# Patient Record
Sex: MALE | Race: ASIAN | NOT HISPANIC OR LATINO | URBAN - METROPOLITAN AREA
[De-identification: names, ages, dates, MRNs, and addresses within clinical notes are randomized per-mention and may not be internally consistent; named-entity substitution may affect disease eponyms.]

---

## 2020-01-01 ENCOUNTER — INPATIENT (INPATIENT)
Facility: HOSPITAL | Age: 85
LOS: 8 days | DRG: 870 | End: 2020-11-21
Attending: INTERNAL MEDICINE | Admitting: INTERNAL MEDICINE
Payer: MEDICARE

## 2020-01-01 VITALS
DIASTOLIC BLOOD PRESSURE: 66 MMHG | HEART RATE: 104 BPM | OXYGEN SATURATION: 87 % | RESPIRATION RATE: 22 BRPM | WEIGHT: 168.65 LBS | HEIGHT: 70 IN | SYSTOLIC BLOOD PRESSURE: 125 MMHG

## 2020-01-01 VITALS — DIASTOLIC BLOOD PRESSURE: 20 MMHG | SYSTOLIC BLOOD PRESSURE: 45 MMHG

## 2020-01-01 DIAGNOSIS — I50.9 HEART FAILURE, UNSPECIFIED: ICD-10-CM

## 2020-01-01 DIAGNOSIS — J18.9 PNEUMONIA, UNSPECIFIED ORGANISM: ICD-10-CM

## 2020-01-01 DIAGNOSIS — R53.81 OTHER MALAISE: ICD-10-CM

## 2020-01-01 DIAGNOSIS — A41.9 SEPSIS, UNSPECIFIED ORGANISM: ICD-10-CM

## 2020-01-01 DIAGNOSIS — E44.0 MODERATE PROTEIN-CALORIE MALNUTRITION: ICD-10-CM

## 2020-01-01 DIAGNOSIS — Z51.5 ENCOUNTER FOR PALLIATIVE CARE: ICD-10-CM

## 2020-01-01 LAB
% ALBUMIN: 44.9 % — SIGNIFICANT CHANGE UP
% ALPHA 1: 8.8 % — SIGNIFICANT CHANGE UP
% ALPHA 2: 11.7 % — SIGNIFICANT CHANGE UP
% BETA: 12.8 % — SIGNIFICANT CHANGE UP
% GAMMA: 21.8 % — SIGNIFICANT CHANGE UP
A1C WITH ESTIMATED AVERAGE GLUCOSE RESULT: 5 % — SIGNIFICANT CHANGE UP (ref 4–5.6)
ABO RH CONFIRMATION: SIGNIFICANT CHANGE UP
ACANTHOCYTES BLD QL SMEAR: SLIGHT — SIGNIFICANT CHANGE UP
ACANTHOCYTES BLD QL SMEAR: SLIGHT — SIGNIFICANT CHANGE UP
ALBUMIN SERPL ELPH-MCNC: 1.5 G/DL — LOW (ref 3.5–5)
ALBUMIN SERPL ELPH-MCNC: 1.6 G/DL — LOW (ref 3.5–5)
ALBUMIN SERPL ELPH-MCNC: 1.7 G/DL — LOW (ref 3.5–5)
ALBUMIN SERPL ELPH-MCNC: 1.7 G/DL — LOW (ref 3.5–5)
ALBUMIN SERPL ELPH-MCNC: 2 G/DL — LOW (ref 3.6–5.5)
ALBUMIN SERPL ELPH-MCNC: 2.4 G/DL — LOW (ref 3.5–5)
ALBUMIN SERPL ELPH-MCNC: 2.5 G/DL — LOW (ref 3.5–5)
ALBUMIN SERPL ELPH-MCNC: 2.5 G/DL — LOW (ref 3.5–5)
ALBUMIN/GLOB SERPL ELPH: 0.8 RATIO — SIGNIFICANT CHANGE UP
ALP SERPL-CCNC: 113 U/L — SIGNIFICANT CHANGE UP (ref 40–120)
ALP SERPL-CCNC: 134 U/L — HIGH (ref 40–120)
ALP SERPL-CCNC: 60 U/L — SIGNIFICANT CHANGE UP (ref 40–120)
ALP SERPL-CCNC: 65 U/L — SIGNIFICANT CHANGE UP (ref 40–120)
ALP SERPL-CCNC: 67 U/L — SIGNIFICANT CHANGE UP (ref 40–120)
ALP SERPL-CCNC: 67 U/L — SIGNIFICANT CHANGE UP (ref 40–120)
ALP SERPL-CCNC: 69 U/L — SIGNIFICANT CHANGE UP (ref 40–120)
ALP SERPL-CCNC: 70 U/L — SIGNIFICANT CHANGE UP (ref 40–120)
ALP SERPL-CCNC: 71 U/L — SIGNIFICANT CHANGE UP (ref 40–120)
ALP SERPL-CCNC: 71 U/L — SIGNIFICANT CHANGE UP (ref 40–120)
ALPHA1 GLOB SERPL ELPH-MCNC: 0.4 G/DL — SIGNIFICANT CHANGE UP (ref 0.1–0.4)
ALPHA2 GLOB SERPL ELPH-MCNC: 0.5 G/DL — SIGNIFICANT CHANGE UP (ref 0.5–1)
ALT FLD-CCNC: 21 U/L DA — SIGNIFICANT CHANGE UP (ref 10–60)
ALT FLD-CCNC: 21 U/L DA — SIGNIFICANT CHANGE UP (ref 10–60)
ALT FLD-CCNC: 23 U/L DA — SIGNIFICANT CHANGE UP (ref 10–60)
ALT FLD-CCNC: 25 U/L DA — SIGNIFICANT CHANGE UP (ref 10–60)
ALT FLD-CCNC: 27 U/L DA — SIGNIFICANT CHANGE UP (ref 10–60)
ALT FLD-CCNC: 39 U/L DA — SIGNIFICANT CHANGE UP (ref 10–60)
ALT FLD-CCNC: 50 U/L DA — SIGNIFICANT CHANGE UP (ref 10–60)
ALT FLD-CCNC: 56 U/L DA — SIGNIFICANT CHANGE UP (ref 10–60)
ALT FLD-CCNC: 70 U/L DA — HIGH (ref 10–60)
ALT FLD-CCNC: 90 U/L DA — HIGH (ref 10–60)
ANA TITR SER: NEGATIVE — SIGNIFICANT CHANGE UP
ANION GAP SERPL CALC-SCNC: 10 MMOL/L — SIGNIFICANT CHANGE UP (ref 5–17)
ANION GAP SERPL CALC-SCNC: 11 MMOL/L — SIGNIFICANT CHANGE UP (ref 5–17)
ANION GAP SERPL CALC-SCNC: 13 MMOL/L — SIGNIFICANT CHANGE UP (ref 5–17)
ANION GAP SERPL CALC-SCNC: 14 MMOL/L — SIGNIFICANT CHANGE UP (ref 5–17)
ANION GAP SERPL CALC-SCNC: 17 MMOL/L — SIGNIFICANT CHANGE UP (ref 5–17)
ANION GAP SERPL CALC-SCNC: 8 MMOL/L — SIGNIFICANT CHANGE UP (ref 5–17)
ANION GAP SERPL CALC-SCNC: 8 MMOL/L — SIGNIFICANT CHANGE UP (ref 5–17)
ANION GAP SERPL CALC-SCNC: 9 MMOL/L — SIGNIFICANT CHANGE UP (ref 5–17)
ANISOCYTOSIS BLD QL: SLIGHT — SIGNIFICANT CHANGE UP
ANISOCYTOSIS BLD QL: SLIGHT — SIGNIFICANT CHANGE UP
APPEARANCE UR: CLEAR — SIGNIFICANT CHANGE UP
APTT BLD: 34.4 SEC — SIGNIFICANT CHANGE UP (ref 27.5–35.5)
APTT BLD: 35.6 SEC — HIGH (ref 27.5–35.5)
APTT BLD: 37.5 SEC — HIGH (ref 27.5–35.5)
APTT BLD: 38.6 SEC — HIGH (ref 27.5–35.5)
APTT BLD: 39.3 SEC — HIGH (ref 27.5–35.5)
APTT BLD: >200 SEC — CRITICAL HIGH (ref 27.5–35.5)
APTT BLD: >200 SEC — CRITICAL HIGH (ref 27.5–35.5)
AST SERPL-CCNC: 174 U/L — HIGH (ref 10–40)
AST SERPL-CCNC: 31 U/L — SIGNIFICANT CHANGE UP (ref 10–40)
AST SERPL-CCNC: 32 U/L — SIGNIFICANT CHANGE UP (ref 10–40)
AST SERPL-CCNC: 35 U/L — SIGNIFICANT CHANGE UP (ref 10–40)
AST SERPL-CCNC: 39 U/L — SIGNIFICANT CHANGE UP (ref 10–40)
AST SERPL-CCNC: 51 U/L — HIGH (ref 10–40)
AST SERPL-CCNC: 56 U/L — HIGH (ref 10–40)
AST SERPL-CCNC: 61 U/L — HIGH (ref 10–40)
AST SERPL-CCNC: 74 U/L — HIGH (ref 10–40)
AST SERPL-CCNC: 96 U/L — HIGH (ref 10–40)
AUTO DIFF PNL BLD: ABNORMAL
B-GLOBULIN SERPL ELPH-MCNC: 0.6 G/DL — SIGNIFICANT CHANGE UP (ref 0.5–1)
BACTERIA # UR AUTO: ABNORMAL /HPF
BASE EXCESS BLDA CALC-SCNC: -1.5 MMOL/L — SIGNIFICANT CHANGE UP (ref -2–2)
BASE EXCESS BLDA CALC-SCNC: -12.9 MMOL/L — LOW (ref -2–2)
BASE EXCESS BLDA CALC-SCNC: -2.1 MMOL/L — LOW (ref -2–2)
BASE EXCESS BLDA CALC-SCNC: -4.2 MMOL/L — LOW (ref -2–2)
BASE EXCESS BLDA CALC-SCNC: -5 MMOL/L — LOW (ref -2–2)
BASE EXCESS BLDA CALC-SCNC: -5.9 MMOL/L — LOW (ref -2–2)
BASE EXCESS BLDA CALC-SCNC: -6 MMOL/L — LOW (ref -2–2)
BASE EXCESS BLDA CALC-SCNC: -6.4 MMOL/L — LOW (ref -2–2)
BASE EXCESS BLDA CALC-SCNC: -9.7 MMOL/L — LOW (ref -2–2)
BASE EXCESS BLDA CALC-SCNC: 0.1 MMOL/L — SIGNIFICANT CHANGE UP (ref -2–2)
BASE EXCESS BLDV CALC-SCNC: -12.6 MMOL/L — LOW (ref -2–2)
BASE EXCESS BLDV CALC-SCNC: -5.7 MMOL/L — LOW (ref -2–2)
BASOPHILS # BLD AUTO: 0 K/UL — SIGNIFICANT CHANGE UP (ref 0–0.2)
BASOPHILS # BLD AUTO: 0.01 K/UL — SIGNIFICANT CHANGE UP (ref 0–0.2)
BASOPHILS # BLD AUTO: 0.01 K/UL — SIGNIFICANT CHANGE UP (ref 0–0.2)
BASOPHILS # BLD AUTO: 0.02 K/UL — SIGNIFICANT CHANGE UP (ref 0–0.2)
BASOPHILS # BLD AUTO: 0.03 K/UL — SIGNIFICANT CHANGE UP (ref 0–0.2)
BASOPHILS # BLD AUTO: 0.04 K/UL — SIGNIFICANT CHANGE UP (ref 0–0.2)
BASOPHILS # BLD AUTO: SIGNIFICANT CHANGE UP K/UL (ref 0–0.2)
BASOPHILS NFR BLD AUTO: 0 % — SIGNIFICANT CHANGE UP (ref 0–2)
BASOPHILS NFR BLD AUTO: 0.1 % — SIGNIFICANT CHANGE UP (ref 0–2)
BASOPHILS NFR BLD AUTO: 0.2 % — SIGNIFICANT CHANGE UP (ref 0–2)
BASOPHILS NFR BLD AUTO: 0.2 % — SIGNIFICANT CHANGE UP (ref 0–2)
BASOPHILS NFR BLD AUTO: 0.3 % — SIGNIFICANT CHANGE UP (ref 0–2)
BASOPHILS NFR BLD AUTO: SIGNIFICANT CHANGE UP % (ref 0–2)
BILIRUB DIRECT SERPL-MCNC: 0.4 MG/DL — HIGH (ref 0–0.2)
BILIRUB DIRECT SERPL-MCNC: 0.4 MG/DL — HIGH (ref 0–0.2)
BILIRUB INDIRECT FLD-MCNC: 0.5 MG/DL — SIGNIFICANT CHANGE UP (ref 0.2–1)
BILIRUB SERPL-MCNC: 0.4 MG/DL — SIGNIFICANT CHANGE UP (ref 0.2–1.2)
BILIRUB SERPL-MCNC: 0.5 MG/DL — SIGNIFICANT CHANGE UP (ref 0.2–1.2)
BILIRUB SERPL-MCNC: 0.6 MG/DL — SIGNIFICANT CHANGE UP (ref 0.2–1.2)
BILIRUB SERPL-MCNC: 0.6 MG/DL — SIGNIFICANT CHANGE UP (ref 0.2–1.2)
BILIRUB SERPL-MCNC: 0.8 MG/DL — SIGNIFICANT CHANGE UP (ref 0.2–1.2)
BILIRUB SERPL-MCNC: 0.8 MG/DL — SIGNIFICANT CHANGE UP (ref 0.2–1.2)
BILIRUB SERPL-MCNC: 0.9 MG/DL — SIGNIFICANT CHANGE UP (ref 0.2–1.2)
BILIRUB UR-MCNC: NEGATIVE — SIGNIFICANT CHANGE UP
BLD GP AB SCN SERPL QL: SIGNIFICANT CHANGE UP
BLD GP AB SCN SERPL QL: SIGNIFICANT CHANGE UP
BLISTER CELLS BLD QL SMEAR: PRESENT — SIGNIFICANT CHANGE UP
BLOOD GAS COMMENTS ARTERIAL: SIGNIFICANT CHANGE UP
BLOOD GAS COMMENTS, VENOUS: SIGNIFICANT CHANGE UP
BLOOD GAS COMMENTS, VENOUS: SIGNIFICANT CHANGE UP
BUN SERPL-MCNC: 23 MG/DL — HIGH (ref 7–18)
BUN SERPL-MCNC: 26 MG/DL — HIGH (ref 7–18)
BUN SERPL-MCNC: 27 MG/DL — HIGH (ref 7–18)
BUN SERPL-MCNC: 28 MG/DL — HIGH (ref 7–18)
BUN SERPL-MCNC: 30 MG/DL — HIGH (ref 7–18)
BUN SERPL-MCNC: 33 MG/DL — HIGH (ref 7–18)
BUN SERPL-MCNC: 37 MG/DL — HIGH (ref 7–18)
BUN SERPL-MCNC: 38 MG/DL — HIGH (ref 7–18)
BUN SERPL-MCNC: 50 MG/DL — HIGH (ref 7–18)
BUN SERPL-MCNC: 51 MG/DL — HIGH (ref 7–18)
BUN SERPL-MCNC: 51 MG/DL — HIGH (ref 7–18)
BUN SERPL-MCNC: 55 MG/DL — HIGH (ref 7–18)
C-ANCA SER-ACNC: NEGATIVE — SIGNIFICANT CHANGE UP
C3 SERPL-MCNC: 52 MG/DL — LOW (ref 81–157)
C4 SERPL-MCNC: 20 MG/DL — SIGNIFICANT CHANGE UP (ref 13–39)
CALCIUM SERPL-MCNC: 6.7 MG/DL — LOW (ref 8.4–10.5)
CALCIUM SERPL-MCNC: 6.7 MG/DL — LOW (ref 8.4–10.5)
CALCIUM SERPL-MCNC: 6.8 MG/DL — LOW (ref 8.4–10.5)
CALCIUM SERPL-MCNC: 6.9 MG/DL — LOW (ref 8.4–10.5)
CALCIUM SERPL-MCNC: 7.1 MG/DL — LOW (ref 8.4–10.5)
CALCIUM SERPL-MCNC: 7.2 MG/DL — LOW (ref 8.4–10.5)
CALCIUM SERPL-MCNC: 7.3 MG/DL — LOW (ref 8.4–10.5)
CALCIUM SERPL-MCNC: 7.4 MG/DL — LOW (ref 8.4–10.5)
CALCIUM SERPL-MCNC: 7.6 MG/DL — LOW (ref 8.4–10.5)
CALCIUM SERPL-MCNC: 8.1 MG/DL — LOW (ref 8.4–10.5)
CALCIUM SERPL-MCNC: 8.5 MG/DL — SIGNIFICANT CHANGE UP (ref 8.4–10.5)
CALCIUM SERPL-MCNC: 8.7 MG/DL — SIGNIFICANT CHANGE UP (ref 8.4–10.5)
CHLORIDE SERPL-SCNC: 100 MMOL/L — SIGNIFICANT CHANGE UP (ref 96–108)
CHLORIDE SERPL-SCNC: 101 MMOL/L — SIGNIFICANT CHANGE UP (ref 96–108)
CHLORIDE SERPL-SCNC: 101 MMOL/L — SIGNIFICANT CHANGE UP (ref 96–108)
CHLORIDE SERPL-SCNC: 102 MMOL/L — SIGNIFICANT CHANGE UP (ref 96–108)
CHLORIDE SERPL-SCNC: 104 MMOL/L — SIGNIFICANT CHANGE UP (ref 96–108)
CHLORIDE SERPL-SCNC: 105 MMOL/L — SIGNIFICANT CHANGE UP (ref 96–108)
CHLORIDE SERPL-SCNC: 98 MMOL/L — SIGNIFICANT CHANGE UP (ref 96–108)
CHLORIDE SERPL-SCNC: 99 MMOL/L — SIGNIFICANT CHANGE UP (ref 96–108)
CHOLEST SERPL-MCNC: 172 MG/DL — SIGNIFICANT CHANGE UP
CO2 SERPL-SCNC: 13 MMOL/L — LOW (ref 22–31)
CO2 SERPL-SCNC: 16 MMOL/L — LOW (ref 22–31)
CO2 SERPL-SCNC: 16 MMOL/L — LOW (ref 22–31)
CO2 SERPL-SCNC: 21 MMOL/L — LOW (ref 22–31)
CO2 SERPL-SCNC: 22 MMOL/L — SIGNIFICANT CHANGE UP (ref 22–31)
CO2 SERPL-SCNC: 23 MMOL/L — SIGNIFICANT CHANGE UP (ref 22–31)
CO2 SERPL-SCNC: 24 MMOL/L — SIGNIFICANT CHANGE UP (ref 22–31)
CO2 SERPL-SCNC: 25 MMOL/L — SIGNIFICANT CHANGE UP (ref 22–31)
CO2 SERPL-SCNC: 25 MMOL/L — SIGNIFICANT CHANGE UP (ref 22–31)
CO2 SERPL-SCNC: 26 MMOL/L — SIGNIFICANT CHANGE UP (ref 22–31)
COLOR SPEC: YELLOW — SIGNIFICANT CHANGE UP
CREAT ?TM UR-MCNC: 88 MG/DL — SIGNIFICANT CHANGE UP
CREAT SERPL-MCNC: 2.77 MG/DL — HIGH (ref 0.5–1.3)
CREAT SERPL-MCNC: 2.97 MG/DL — HIGH (ref 0.5–1.3)
CREAT SERPL-MCNC: 3.03 MG/DL — HIGH (ref 0.5–1.3)
CREAT SERPL-MCNC: 3.27 MG/DL — HIGH (ref 0.5–1.3)
CREAT SERPL-MCNC: 3.51 MG/DL — HIGH (ref 0.5–1.3)
CREAT SERPL-MCNC: 3.56 MG/DL — HIGH (ref 0.5–1.3)
CREAT SERPL-MCNC: 3.61 MG/DL — HIGH (ref 0.5–1.3)
CREAT SERPL-MCNC: 4.04 MG/DL — HIGH (ref 0.5–1.3)
CREAT SERPL-MCNC: 4.19 MG/DL — HIGH (ref 0.5–1.3)
CREAT SERPL-MCNC: 4.19 MG/DL — HIGH (ref 0.5–1.3)
CREAT SERPL-MCNC: 4.21 MG/DL — HIGH (ref 0.5–1.3)
CREAT SERPL-MCNC: 4.24 MG/DL — HIGH (ref 0.5–1.3)
CREAT SERPL-MCNC: 5.37 MG/DL — HIGH (ref 0.5–1.3)
CREAT SERPL-MCNC: 5.6 MG/DL — HIGH (ref 0.5–1.3)
CREAT SERPL-MCNC: 5.96 MG/DL — HIGH (ref 0.5–1.3)
CRP SERPL-MCNC: 14.81 MG/DL — HIGH (ref 0–0.4)
CULTURE RESULTS: NO GROWTH — SIGNIFICANT CHANGE UP
CULTURE RESULTS: SIGNIFICANT CHANGE UP
D DIMER BLD IA.RAPID-MCNC: 941 NG/ML DDU — HIGH
DIFF PNL FLD: ABNORMAL
EOSINOPHIL # BLD AUTO: 0 K/UL — SIGNIFICANT CHANGE UP (ref 0–0.5)
EOSINOPHIL # BLD AUTO: 0.01 K/UL — SIGNIFICANT CHANGE UP (ref 0–0.5)
EOSINOPHIL # BLD AUTO: 0.05 K/UL — SIGNIFICANT CHANGE UP (ref 0–0.5)
EOSINOPHIL # BLD AUTO: 0.09 K/UL — SIGNIFICANT CHANGE UP (ref 0–0.5)
EOSINOPHIL # BLD AUTO: 0.18 K/UL — SIGNIFICANT CHANGE UP (ref 0–0.5)
EOSINOPHIL # BLD AUTO: 0.2 K/UL — SIGNIFICANT CHANGE UP (ref 0–0.5)
EOSINOPHIL # BLD AUTO: 0.22 K/UL — SIGNIFICANT CHANGE UP (ref 0–0.5)
EOSINOPHIL # BLD AUTO: 0.34 K/UL — SIGNIFICANT CHANGE UP (ref 0–0.5)
EOSINOPHIL # BLD AUTO: SIGNIFICANT CHANGE UP K/UL (ref 0–0.5)
EOSINOPHIL NFR BLD AUTO: 0 % — SIGNIFICANT CHANGE UP (ref 0–6)
EOSINOPHIL NFR BLD AUTO: 0.1 % — SIGNIFICANT CHANGE UP (ref 0–6)
EOSINOPHIL NFR BLD AUTO: 0.3 % — SIGNIFICANT CHANGE UP (ref 0–6)
EOSINOPHIL NFR BLD AUTO: 0.7 % — SIGNIFICANT CHANGE UP (ref 0–6)
EOSINOPHIL NFR BLD AUTO: 1.3 % — SIGNIFICANT CHANGE UP (ref 0–6)
EOSINOPHIL NFR BLD AUTO: 1.4 % — SIGNIFICANT CHANGE UP (ref 0–6)
EOSINOPHIL NFR BLD AUTO: 1.5 % — SIGNIFICANT CHANGE UP (ref 0–6)
EOSINOPHIL NFR BLD AUTO: 2.4 % — SIGNIFICANT CHANGE UP (ref 0–6)
EOSINOPHIL NFR BLD AUTO: SIGNIFICANT CHANGE UP % (ref 0–6)
EPI CELLS # UR: SIGNIFICANT CHANGE UP /HPF
ERYTHROCYTE [SEDIMENTATION RATE] IN BLOOD: 84 MM/HR — HIGH (ref 0–20)
ESTIMATED AVERAGE GLUCOSE: 97 MG/DL — SIGNIFICANT CHANGE UP (ref 68–114)
FIBRINOGEN PPP-MCNC: 568 MG/DL — HIGH (ref 290–520)
FOLATE SERPL-MCNC: 18.4 NG/ML — SIGNIFICANT CHANGE UP
GAMMA GLOBULIN: 1 G/DL — SIGNIFICANT CHANGE UP (ref 0.6–1.6)
GBM IGG SER-ACNC: <1 AI — SIGNIFICANT CHANGE UP
GLUCOSE BLDC GLUCOMTR-MCNC: 106 MG/DL — HIGH (ref 70–99)
GLUCOSE BLDC GLUCOMTR-MCNC: 110 MG/DL — HIGH (ref 70–99)
GLUCOSE BLDC GLUCOMTR-MCNC: 121 MG/DL — HIGH (ref 70–99)
GLUCOSE BLDC GLUCOMTR-MCNC: 130 MG/DL — HIGH (ref 70–99)
GLUCOSE BLDC GLUCOMTR-MCNC: 131 MG/DL — HIGH (ref 70–99)
GLUCOSE BLDC GLUCOMTR-MCNC: 134 MG/DL — HIGH (ref 70–99)
GLUCOSE BLDC GLUCOMTR-MCNC: 136 MG/DL — HIGH (ref 70–99)
GLUCOSE BLDC GLUCOMTR-MCNC: 142 MG/DL — HIGH (ref 70–99)
GLUCOSE BLDC GLUCOMTR-MCNC: 151 MG/DL — HIGH (ref 70–99)
GLUCOSE BLDC GLUCOMTR-MCNC: 168 MG/DL — HIGH (ref 70–99)
GLUCOSE BLDC GLUCOMTR-MCNC: 200 MG/DL — HIGH (ref 70–99)
GLUCOSE BLDC GLUCOMTR-MCNC: 218 MG/DL — HIGH (ref 70–99)
GLUCOSE BLDC GLUCOMTR-MCNC: 222 MG/DL — HIGH (ref 70–99)
GLUCOSE BLDC GLUCOMTR-MCNC: 28 MG/DL — CRITICAL LOW (ref 70–99)
GLUCOSE BLDC GLUCOMTR-MCNC: 99 MG/DL — SIGNIFICANT CHANGE UP (ref 70–99)
GLUCOSE SERPL-MCNC: 104 MG/DL — HIGH (ref 70–99)
GLUCOSE SERPL-MCNC: 111 MG/DL — HIGH (ref 70–99)
GLUCOSE SERPL-MCNC: 113 MG/DL — HIGH (ref 70–99)
GLUCOSE SERPL-MCNC: 116 MG/DL — HIGH (ref 70–99)
GLUCOSE SERPL-MCNC: 125 MG/DL — HIGH (ref 70–99)
GLUCOSE SERPL-MCNC: 135 MG/DL — HIGH (ref 70–99)
GLUCOSE SERPL-MCNC: 160 MG/DL — HIGH (ref 70–99)
GLUCOSE SERPL-MCNC: 208 MG/DL — HIGH (ref 70–99)
GLUCOSE SERPL-MCNC: 79 MG/DL — SIGNIFICANT CHANGE UP (ref 70–99)
GLUCOSE SERPL-MCNC: 80 MG/DL — SIGNIFICANT CHANGE UP (ref 70–99)
GLUCOSE SERPL-MCNC: 81 MG/DL — SIGNIFICANT CHANGE UP (ref 70–99)
GLUCOSE SERPL-MCNC: 82 MG/DL — SIGNIFICANT CHANGE UP (ref 70–99)
GLUCOSE SERPL-MCNC: 94 MG/DL — SIGNIFICANT CHANGE UP (ref 70–99)
GLUCOSE SERPL-MCNC: 97 MG/DL — SIGNIFICANT CHANGE UP (ref 70–99)
GLUCOSE UR QL: NEGATIVE — SIGNIFICANT CHANGE UP
GRAM STN FLD: SIGNIFICANT CHANGE UP
GRAN CASTS # UR COMP ASSIST: ABNORMAL /LPF
HAPTOGLOB SERPL-MCNC: 106 MG/DL — SIGNIFICANT CHANGE UP (ref 34–200)
HAV IGM SER-ACNC: SIGNIFICANT CHANGE UP
HBV CORE IGM SER-ACNC: SIGNIFICANT CHANGE UP
HBV SURFACE AG SER-ACNC: SIGNIFICANT CHANGE UP
HCO3 BLDA-SCNC: 15 MMOL/L — LOW (ref 23–27)
HCO3 BLDA-SCNC: 16 MMOL/L — LOW (ref 23–27)
HCO3 BLDA-SCNC: 20 MMOL/L — LOW (ref 23–27)
HCO3 BLDA-SCNC: 20 MMOL/L — LOW (ref 23–27)
HCO3 BLDA-SCNC: 21 MMOL/L — LOW (ref 23–27)
HCO3 BLDA-SCNC: 22 MMOL/L — LOW (ref 23–27)
HCO3 BLDA-SCNC: 22 MMOL/L — LOW (ref 23–27)
HCO3 BLDA-SCNC: 23 MMOL/L — SIGNIFICANT CHANGE UP (ref 23–27)
HCO3 BLDA-SCNC: 23 MMOL/L — SIGNIFICANT CHANGE UP (ref 23–27)
HCO3 BLDA-SCNC: 24 MMOL/L — SIGNIFICANT CHANGE UP (ref 23–27)
HCO3 BLDV-SCNC: 16 MMOL/L — LOW (ref 21–29)
HCO3 BLDV-SCNC: 22 MMOL/L — SIGNIFICANT CHANGE UP (ref 21–29)
HCT VFR BLD CALC: 20.3 % — CRITICAL LOW (ref 39–50)
HCT VFR BLD CALC: 20.3 % — CRITICAL LOW (ref 39–50)
HCT VFR BLD CALC: 21.7 % — LOW (ref 39–50)
HCT VFR BLD CALC: 22 % — LOW (ref 39–50)
HCT VFR BLD CALC: 22.3 % — LOW (ref 39–50)
HCT VFR BLD CALC: 22.4 % — LOW (ref 39–50)
HCT VFR BLD CALC: 23.3 % — LOW (ref 39–50)
HCT VFR BLD CALC: 23.6 % — LOW (ref 39–50)
HCT VFR BLD CALC: 24 % — LOW (ref 39–50)
HCT VFR BLD CALC: 24.3 % — LOW (ref 39–50)
HCT VFR BLD CALC: 24.3 % — LOW (ref 39–50)
HCT VFR BLD CALC: 24.6 % — LOW (ref 39–50)
HCT VFR BLD CALC: 25 % — LOW (ref 39–50)
HCT VFR BLD CALC: 25.7 % — LOW (ref 39–50)
HCT VFR BLD CALC: 26.9 % — LOW (ref 39–50)
HCT VFR BLD CALC: 27.5 % — LOW (ref 39–50)
HCT VFR BLD CALC: 28.8 % — LOW (ref 39–50)
HCV AB S/CO SERPL IA: 0.15 S/CO — SIGNIFICANT CHANGE UP (ref 0–0.99)
HCV AB SERPL-IMP: SIGNIFICANT CHANGE UP
HDLC SERPL-MCNC: 46 MG/DL — SIGNIFICANT CHANGE UP
HEPARIN-PF4 AB RESULT: 0.6 U/ML — SIGNIFICANT CHANGE UP (ref 0–0.9)
HGB BLD-MCNC: 6.3 G/DL — CRITICAL LOW (ref 13–17)
HGB BLD-MCNC: 6.4 G/DL — CRITICAL LOW (ref 13–17)
HGB BLD-MCNC: 7.1 G/DL — LOW (ref 13–17)
HGB BLD-MCNC: 7.1 G/DL — LOW (ref 13–17)
HGB BLD-MCNC: 7.2 G/DL — LOW (ref 13–17)
HGB BLD-MCNC: 7.4 G/DL — LOW (ref 13–17)
HGB BLD-MCNC: 7.6 G/DL — LOW (ref 13–17)
HGB BLD-MCNC: 7.8 G/DL — LOW (ref 13–17)
HGB BLD-MCNC: 7.8 G/DL — LOW (ref 13–17)
HGB BLD-MCNC: 8.1 G/DL — LOW (ref 13–17)
HGB BLD-MCNC: 8.4 G/DL — LOW (ref 13–17)
HGB BLD-MCNC: 8.5 G/DL — LOW (ref 13–17)
HGB BLD-MCNC: 9 G/DL — LOW (ref 13–17)
HGB BLD-MCNC: 9.2 G/DL — LOW (ref 13–17)
HGB BLD-MCNC: 9.7 G/DL — LOW (ref 13–17)
HOROWITZ INDEX BLDA+IHG-RTO: 100 — SIGNIFICANT CHANGE UP
HOROWITZ INDEX BLDA+IHG-RTO: 60 — SIGNIFICANT CHANGE UP
HOROWITZ INDEX BLDA+IHG-RTO: 60 — SIGNIFICANT CHANGE UP
HOROWITZ INDEX BLDA+IHG-RTO: 70 — SIGNIFICANT CHANGE UP
HOROWITZ INDEX BLDA+IHG-RTO: 80 — SIGNIFICANT CHANGE UP
HOROWITZ INDEX BLDA+IHG-RTO: 90 — SIGNIFICANT CHANGE UP
HOROWITZ INDEX BLDV+IHG-RTO: 100 — SIGNIFICANT CHANGE UP
HOROWITZ INDEX BLDV+IHG-RTO: 100 — SIGNIFICANT CHANGE UP
HYALINE CASTS # UR AUTO: ABNORMAL /LPF
HYPOCHROMIA BLD QL: SLIGHT — SIGNIFICANT CHANGE UP
HYPOSEGMENTATION: PRESENT — SIGNIFICANT CHANGE UP
IMM GRANULOCYTES NFR BLD AUTO: 0.5 % — SIGNIFICANT CHANGE UP (ref 0–1.5)
IMM GRANULOCYTES NFR BLD AUTO: 0.6 % — SIGNIFICANT CHANGE UP (ref 0–1.5)
IMM GRANULOCYTES NFR BLD AUTO: 0.8 % — SIGNIFICANT CHANGE UP (ref 0–1.5)
IMM GRANULOCYTES NFR BLD AUTO: 0.8 % — SIGNIFICANT CHANGE UP (ref 0–1.5)
IMM GRANULOCYTES NFR BLD AUTO: 1.2 % — SIGNIFICANT CHANGE UP (ref 0–1.5)
IMM GRANULOCYTES NFR BLD AUTO: SIGNIFICANT CHANGE UP % (ref 0–1.5)
INR BLD: 1.29 RATIO — HIGH (ref 0.88–1.16)
INR BLD: 1.4 RATIO — HIGH (ref 0.88–1.16)
INR BLD: 1.56 RATIO — HIGH (ref 0.88–1.16)
INR BLD: 1.75 RATIO — HIGH (ref 0.88–1.16)
INR BLD: 1.83 RATIO — HIGH (ref 0.88–1.16)
INR BLD: 1.94 RATIO — HIGH (ref 0.88–1.16)
INTERPRETATION SERPL IFE-IMP: SIGNIFICANT CHANGE UP
IRON SATN MFR SERPL: 17 % — LOW (ref 20–55)
IRON SATN MFR SERPL: 24 UG/DL — LOW (ref 65–170)
KAPPA LC SER QL IFE: 14.62 MG/DL — HIGH (ref 0.33–1.94)
KAPPA/LAMBDA FREE LIGHT CHAIN RATIO, SERUM: 1.33 RATIO — SIGNIFICANT CHANGE UP (ref 0.26–1.65)
KETONES UR-MCNC: NEGATIVE — SIGNIFICANT CHANGE UP
LACTATE SERPL-SCNC: 0.8 MMOL/L — SIGNIFICANT CHANGE UP (ref 0.7–2)
LACTATE SERPL-SCNC: 2 MMOL/L — SIGNIFICANT CHANGE UP (ref 0.7–2)
LAMBDA LC SER QL IFE: 10.97 MG/DL — HIGH (ref 0.57–2.63)
LDH SERPL L TO P-CCNC: 271 U/L — HIGH (ref 120–225)
LEUKOCYTE ESTERASE UR-ACNC: ABNORMAL
LG PLATELETS BLD QL AUTO: SLIGHT — SIGNIFICANT CHANGE UP
LIPID PNL WITH DIRECT LDL SERPL: 109 MG/DL — HIGH
LYMPHOCYTES # BLD AUTO: 0.38 K/UL — LOW (ref 1–3.3)
LYMPHOCYTES # BLD AUTO: 0.43 K/UL — LOW (ref 1–3.3)
LYMPHOCYTES # BLD AUTO: 0.49 K/UL — LOW (ref 1–3.3)
LYMPHOCYTES # BLD AUTO: 0.5 K/UL — LOW (ref 1–3.3)
LYMPHOCYTES # BLD AUTO: 0.51 K/UL — LOW (ref 1–3.3)
LYMPHOCYTES # BLD AUTO: 0.56 K/UL — LOW (ref 1–3.3)
LYMPHOCYTES # BLD AUTO: 0.64 K/UL — LOW (ref 1–3.3)
LYMPHOCYTES # BLD AUTO: 0.82 K/UL — LOW (ref 1–3.3)
LYMPHOCYTES # BLD AUTO: 3 % — LOW (ref 13–44)
LYMPHOCYTES # BLD AUTO: 3.2 % — LOW (ref 13–44)
LYMPHOCYTES # BLD AUTO: 3.3 % — LOW (ref 13–44)
LYMPHOCYTES # BLD AUTO: 3.7 % — LOW (ref 13–44)
LYMPHOCYTES # BLD AUTO: 4.1 % — LOW (ref 13–44)
LYMPHOCYTES # BLD AUTO: 4.5 % — LOW (ref 13–44)
LYMPHOCYTES # BLD AUTO: 5 % — LOW (ref 13–44)
LYMPHOCYTES # BLD AUTO: 5.7 % — LOW (ref 13–44)
LYMPHOCYTES # BLD AUTO: SIGNIFICANT CHANGE UP % (ref 13–44)
LYMPHOCYTES # BLD AUTO: SIGNIFICANT CHANGE UP K/UL (ref 1–3.3)
MACROCYTES BLD QL: SLIGHT — SIGNIFICANT CHANGE UP
MAGNESIUM SERPL-MCNC: 2 MG/DL — SIGNIFICANT CHANGE UP (ref 1.6–2.6)
MAGNESIUM SERPL-MCNC: 2 MG/DL — SIGNIFICANT CHANGE UP (ref 1.6–2.6)
MAGNESIUM SERPL-MCNC: 2.1 MG/DL — SIGNIFICANT CHANGE UP (ref 1.6–2.6)
MAGNESIUM SERPL-MCNC: 2.2 MG/DL — SIGNIFICANT CHANGE UP (ref 1.6–2.6)
MAGNESIUM SERPL-MCNC: 2.2 MG/DL — SIGNIFICANT CHANGE UP (ref 1.6–2.6)
MAGNESIUM SERPL-MCNC: 2.5 MG/DL — SIGNIFICANT CHANGE UP (ref 1.6–2.6)
MANUAL SMEAR VERIFICATION: SIGNIFICANT CHANGE UP
MANUAL SMEAR VERIFICATION: SIGNIFICANT CHANGE UP
MCHC RBC-ENTMCNC: 31 GM/DL — LOW (ref 32–36)
MCHC RBC-ENTMCNC: 31.5 GM/DL — LOW (ref 32–36)
MCHC RBC-ENTMCNC: 31.8 GM/DL — LOW (ref 32–36)
MCHC RBC-ENTMCNC: 32.1 PG — SIGNIFICANT CHANGE UP (ref 27–34)
MCHC RBC-ENTMCNC: 32.2 PG — SIGNIFICANT CHANGE UP (ref 27–34)
MCHC RBC-ENTMCNC: 32.4 PG — SIGNIFICANT CHANGE UP (ref 27–34)
MCHC RBC-ENTMCNC: 32.4 PG — SIGNIFICANT CHANGE UP (ref 27–34)
MCHC RBC-ENTMCNC: 32.5 GM/DL — SIGNIFICANT CHANGE UP (ref 32–36)
MCHC RBC-ENTMCNC: 32.5 PG — SIGNIFICANT CHANGE UP (ref 27–34)
MCHC RBC-ENTMCNC: 32.6 GM/DL — SIGNIFICANT CHANGE UP (ref 32–36)
MCHC RBC-ENTMCNC: 32.6 PG — SIGNIFICANT CHANGE UP (ref 27–34)
MCHC RBC-ENTMCNC: 32.7 GM/DL — SIGNIFICANT CHANGE UP (ref 32–36)
MCHC RBC-ENTMCNC: 32.7 GM/DL — SIGNIFICANT CHANGE UP (ref 32–36)
MCHC RBC-ENTMCNC: 32.8 PG — SIGNIFICANT CHANGE UP (ref 27–34)
MCHC RBC-ENTMCNC: 32.8 PG — SIGNIFICANT CHANGE UP (ref 27–34)
MCHC RBC-ENTMCNC: 32.9 GM/DL — SIGNIFICANT CHANGE UP (ref 32–36)
MCHC RBC-ENTMCNC: 33 GM/DL — SIGNIFICANT CHANGE UP (ref 32–36)
MCHC RBC-ENTMCNC: 33.1 GM/DL — SIGNIFICANT CHANGE UP (ref 32–36)
MCHC RBC-ENTMCNC: 33.1 GM/DL — SIGNIFICANT CHANGE UP (ref 32–36)
MCHC RBC-ENTMCNC: 33.2 PG — SIGNIFICANT CHANGE UP (ref 27–34)
MCHC RBC-ENTMCNC: 33.2 PG — SIGNIFICANT CHANGE UP (ref 27–34)
MCHC RBC-ENTMCNC: 33.3 GM/DL — SIGNIFICANT CHANGE UP (ref 32–36)
MCHC RBC-ENTMCNC: 33.3 GM/DL — SIGNIFICANT CHANGE UP (ref 32–36)
MCHC RBC-ENTMCNC: 33.5 GM/DL — SIGNIFICANT CHANGE UP (ref 32–36)
MCHC RBC-ENTMCNC: 33.5 GM/DL — SIGNIFICANT CHANGE UP (ref 32–36)
MCHC RBC-ENTMCNC: 33.6 GM/DL — SIGNIFICANT CHANGE UP (ref 32–36)
MCHC RBC-ENTMCNC: 33.6 PG — SIGNIFICANT CHANGE UP (ref 27–34)
MCHC RBC-ENTMCNC: 33.7 GM/DL — SIGNIFICANT CHANGE UP (ref 32–36)
MCV RBC AUTO: 100.4 FL — HIGH (ref 80–100)
MCV RBC AUTO: 101.8 FL — HIGH (ref 80–100)
MCV RBC AUTO: 102.8 FL — HIGH (ref 80–100)
MCV RBC AUTO: 104.1 FL — HIGH (ref 80–100)
MCV RBC AUTO: 104.6 FL — HIGH (ref 80–100)
MCV RBC AUTO: 96.6 FL — SIGNIFICANT CHANGE UP (ref 80–100)
MCV RBC AUTO: 97.2 FL — SIGNIFICANT CHANGE UP (ref 80–100)
MCV RBC AUTO: 97.5 FL — SIGNIFICANT CHANGE UP (ref 80–100)
MCV RBC AUTO: 97.5 FL — SIGNIFICANT CHANGE UP (ref 80–100)
MCV RBC AUTO: 97.6 FL — SIGNIFICANT CHANGE UP (ref 80–100)
MCV RBC AUTO: 98.1 FL — SIGNIFICANT CHANGE UP (ref 80–100)
MCV RBC AUTO: 98.8 FL — SIGNIFICANT CHANGE UP (ref 80–100)
MCV RBC AUTO: 99.5 FL — SIGNIFICANT CHANGE UP (ref 80–100)
MONOCYTES # BLD AUTO: 0.1 K/UL — SIGNIFICANT CHANGE UP (ref 0–0.9)
MONOCYTES # BLD AUTO: 0.4 K/UL — SIGNIFICANT CHANGE UP (ref 0–0.9)
MONOCYTES # BLD AUTO: 0.48 K/UL — SIGNIFICANT CHANGE UP (ref 0–0.9)
MONOCYTES # BLD AUTO: 0.48 K/UL — SIGNIFICANT CHANGE UP (ref 0–0.9)
MONOCYTES # BLD AUTO: 0.52 K/UL — SIGNIFICANT CHANGE UP (ref 0–0.9)
MONOCYTES # BLD AUTO: 0.57 K/UL — SIGNIFICANT CHANGE UP (ref 0–0.9)
MONOCYTES # BLD AUTO: 0.76 K/UL — SIGNIFICANT CHANGE UP (ref 0–0.9)
MONOCYTES # BLD AUTO: 1.22 K/UL — HIGH (ref 0–0.9)
MONOCYTES # BLD AUTO: SIGNIFICANT CHANGE UP K/UL (ref 0–0.9)
MONOCYTES NFR BLD AUTO: 1 % — LOW (ref 2–14)
MONOCYTES NFR BLD AUTO: 2.9 % — SIGNIFICANT CHANGE UP (ref 2–14)
MONOCYTES NFR BLD AUTO: 3.4 % — SIGNIFICANT CHANGE UP (ref 2–14)
MONOCYTES NFR BLD AUTO: 3.7 % — SIGNIFICANT CHANGE UP (ref 2–14)
MONOCYTES NFR BLD AUTO: 3.8 % — SIGNIFICANT CHANGE UP (ref 2–14)
MONOCYTES NFR BLD AUTO: 4.6 % — SIGNIFICANT CHANGE UP (ref 2–14)
MONOCYTES NFR BLD AUTO: 4.8 % — SIGNIFICANT CHANGE UP (ref 2–14)
MONOCYTES NFR BLD AUTO: 8.4 % — SIGNIFICANT CHANGE UP (ref 2–14)
MONOCYTES NFR BLD AUTO: SIGNIFICANT CHANGE UP % (ref 2–14)
MRSA PCR RESULT.: SIGNIFICANT CHANGE UP
MRSA PCR RESULT.: SIGNIFICANT CHANGE UP
NEUTROPHILS # BLD AUTO: 11.4 K/UL — HIGH (ref 1.8–7.4)
NEUTROPHILS # BLD AUTO: 11.92 K/UL — HIGH (ref 1.8–7.4)
NEUTROPHILS # BLD AUTO: 12.07 K/UL — HIGH (ref 1.8–7.4)
NEUTROPHILS # BLD AUTO: 12.34 K/UL — HIGH (ref 1.8–7.4)
NEUTROPHILS # BLD AUTO: 12.59 K/UL — HIGH (ref 1.8–7.4)
NEUTROPHILS # BLD AUTO: 12.59 K/UL — HIGH (ref 1.8–7.4)
NEUTROPHILS # BLD AUTO: 14.33 K/UL — HIGH (ref 1.8–7.4)
NEUTROPHILS # BLD AUTO: 9.13 K/UL — HIGH (ref 1.8–7.4)
NEUTROPHILS # BLD AUTO: SIGNIFICANT CHANGE UP K/UL (ref 1.8–7.4)
NEUTROPHILS NFR BLD AUTO: 83.6 % — HIGH (ref 43–77)
NEUTROPHILS NFR BLD AUTO: 84 % — HIGH (ref 43–77)
NEUTROPHILS NFR BLD AUTO: 89 % — HIGH (ref 43–77)
NEUTROPHILS NFR BLD AUTO: 90.1 % — HIGH (ref 43–77)
NEUTROPHILS NFR BLD AUTO: 90.7 % — HIGH (ref 43–77)
NEUTROPHILS NFR BLD AUTO: 90.9 % — HIGH (ref 43–77)
NEUTROPHILS NFR BLD AUTO: 91.3 % — HIGH (ref 43–77)
NEUTROPHILS NFR BLD AUTO: 91.4 % — HIGH (ref 43–77)
NEUTROPHILS NFR BLD AUTO: SIGNIFICANT CHANGE UP % (ref 43–77)
NEUTS BAND # BLD: 10 % — HIGH (ref 0–8)
NITRITE UR-MCNC: NEGATIVE — SIGNIFICANT CHANGE UP
NON HDL CHOLESTEROL: 126 MG/DL — SIGNIFICANT CHANGE UP
NRBC # BLD: 0 /100 WBCS — SIGNIFICANT CHANGE UP (ref 0–0)
NRBC # BLD: 0 /100 — SIGNIFICANT CHANGE UP (ref 0–0)
NRBC # BLD: SIGNIFICANT CHANGE UP /100 WBCS (ref 0–0)
NT-PROBNP SERPL-SCNC: 8678 PG/ML — HIGH (ref 0–450)
OSMOLALITY UR: 284 MOS/KG — SIGNIFICANT CHANGE UP (ref 50–1200)
OVALOCYTES BLD QL SMEAR: SLIGHT — SIGNIFICANT CHANGE UP
P-ANCA SER-ACNC: NEGATIVE — SIGNIFICANT CHANGE UP
PCO2 BLDA: 34 MMHG — SIGNIFICANT CHANGE UP (ref 32–46)
PCO2 BLDA: 38 MMHG — SIGNIFICANT CHANGE UP (ref 32–46)
PCO2 BLDA: 40 MMHG — SIGNIFICANT CHANGE UP (ref 32–46)
PCO2 BLDA: 41 MMHG — SIGNIFICANT CHANGE UP (ref 32–46)
PCO2 BLDA: 42 MMHG — SIGNIFICANT CHANGE UP (ref 32–46)
PCO2 BLDA: 43 MMHG — SIGNIFICANT CHANGE UP (ref 32–46)
PCO2 BLDA: 48 MMHG — HIGH (ref 32–46)
PCO2 BLDA: 50 MMHG — HIGH (ref 32–46)
PCO2 BLDA: 54 MMHG — HIGH (ref 32–46)
PCO2 BLDA: 66 MMHG — HIGH (ref 32–46)
PCO2 BLDV: 51 MMHG — HIGH (ref 35–50)
PCO2 BLDV: 66 MMHG — HIGH (ref 35–50)
PF4 HEPARIN CMPLX AB SER-ACNC: NEGATIVE — SIGNIFICANT CHANGE UP
PH BLDA: 7.11 — CRITICAL LOW (ref 7.35–7.45)
PH BLDA: 7.14 — CRITICAL LOW (ref 7.35–7.45)
PH BLDA: 7.24 — LOW (ref 7.35–7.45)
PH BLDA: 7.24 — LOW (ref 7.35–7.45)
PH BLDA: 7.29 — LOW (ref 7.35–7.45)
PH BLDA: 7.3 — LOW (ref 7.35–7.45)
PH BLDA: 7.31 — LOW (ref 7.35–7.45)
PH BLDA: 7.31 — LOW (ref 7.35–7.45)
PH BLDA: 7.39 — SIGNIFICANT CHANGE UP (ref 7.35–7.45)
PH BLDA: 7.45 — SIGNIFICANT CHANGE UP (ref 7.35–7.45)
PH BLDV: 7.11 — CRITICAL LOW (ref 7.35–7.45)
PH BLDV: 7.15 — CRITICAL LOW (ref 7.35–7.45)
PH UR: 5 — SIGNIFICANT CHANGE UP (ref 5–8)
PHOSPHATE 24H UR-MCNC: SIGNIFICANT CHANGE UP
PHOSPHATE CL ?TM UR+SERPL-VRATE: SIGNIFICANT CHANGE UP % (ref 78–97)
PHOSPHATE SERPL-MCNC: 1 MG/DL — CRITICAL LOW (ref 2.5–4.5)
PHOSPHATE SERPL-MCNC: 1.4 MG/DL — LOW (ref 2.5–4.5)
PHOSPHATE SERPL-MCNC: 2.4 MG/DL — LOW (ref 2.5–4.5)
PHOSPHATE SERPL-MCNC: 3 MG/DL — SIGNIFICANT CHANGE UP (ref 2.5–4.5)
PHOSPHATE SERPL-MCNC: 3.2 MG/DL — SIGNIFICANT CHANGE UP (ref 2.5–4.5)
PHOSPHATE SERPL-MCNC: 3.4 MG/DL — SIGNIFICANT CHANGE UP (ref 2.5–4.5)
PHOSPHATE SERPL-MCNC: 4.8 MG/DL — HIGH (ref 2.5–4.5)
PHOSPHATE SERPL-MCNC: 5.9 MG/DL — HIGH (ref 2.5–4.5)
PHOSPHATE SERPL-MCNC: 6.1 MG/DL — HIGH (ref 2.5–4.5)
PLAT MORPH BLD: NORMAL — SIGNIFICANT CHANGE UP
PLAT MORPH BLD: NORMAL — SIGNIFICANT CHANGE UP
PLATELET # BLD AUTO: 102 K/UL — LOW (ref 150–400)
PLATELET # BLD AUTO: 112 K/UL — LOW (ref 150–400)
PLATELET # BLD AUTO: 125 K/UL — LOW (ref 150–400)
PLATELET # BLD AUTO: 142 K/UL — LOW (ref 150–400)
PLATELET # BLD AUTO: 158 K/UL — SIGNIFICANT CHANGE UP (ref 150–400)
PLATELET # BLD AUTO: 161 K/UL — SIGNIFICANT CHANGE UP (ref 150–400)
PLATELET # BLD AUTO: 206 K/UL — SIGNIFICANT CHANGE UP (ref 150–400)
PLATELET # BLD AUTO: 239 K/UL — SIGNIFICANT CHANGE UP (ref 150–400)
PLATELET # BLD AUTO: 255 K/UL — SIGNIFICANT CHANGE UP (ref 150–400)
PLATELET # BLD AUTO: 261 K/UL — SIGNIFICANT CHANGE UP (ref 150–400)
PLATELET # BLD AUTO: 263 K/UL — SIGNIFICANT CHANGE UP (ref 150–400)
PLATELET # BLD AUTO: 290 K/UL — SIGNIFICANT CHANGE UP (ref 150–400)
PLATELET # BLD AUTO: 64 K/UL — LOW (ref 150–400)
PLATELET # BLD AUTO: 75 K/UL — LOW (ref 150–400)
PLATELET # BLD AUTO: 79 K/UL — LOW (ref 150–400)
PLATELET # BLD AUTO: 80 K/UL — LOW (ref 150–400)
PLATELET # BLD AUTO: 90 K/UL — LOW (ref 150–400)
PLATELET COUNT - ESTIMATE: ABNORMAL
PLATELET COUNT - ESTIMATE: NORMAL — SIGNIFICANT CHANGE UP
PO2 BLDA: 132 MMHG — HIGH (ref 74–108)
PO2 BLDA: 49 MMHG — CRITICAL LOW (ref 74–108)
PO2 BLDA: 57 MMHG — LOW (ref 74–108)
PO2 BLDA: 59 MMHG — LOW (ref 74–108)
PO2 BLDA: 60 MMHG — LOW (ref 74–108)
PO2 BLDA: 60 MMHG — LOW (ref 74–108)
PO2 BLDA: 61 MMHG — LOW (ref 74–108)
PO2 BLDA: 62 MMHG — LOW (ref 74–108)
PO2 BLDA: 68 MMHG — LOW (ref 74–108)
PO2 BLDA: 84 MMHG — SIGNIFICANT CHANGE UP (ref 74–108)
PO2 BLDV: 33 MMHG — SIGNIFICANT CHANGE UP (ref 25–45)
PO2 BLDV: 84 MMHG — HIGH (ref 25–45)
POIKILOCYTOSIS BLD QL AUTO: SLIGHT — SIGNIFICANT CHANGE UP
POIKILOCYTOSIS BLD QL AUTO: SLIGHT — SIGNIFICANT CHANGE UP
POTASSIUM SERPL-MCNC: 3.1 MMOL/L — LOW (ref 3.5–5.3)
POTASSIUM SERPL-MCNC: 3.3 MMOL/L — LOW (ref 3.5–5.3)
POTASSIUM SERPL-MCNC: 3.5 MMOL/L — SIGNIFICANT CHANGE UP (ref 3.5–5.3)
POTASSIUM SERPL-MCNC: 3.6 MMOL/L — SIGNIFICANT CHANGE UP (ref 3.5–5.3)
POTASSIUM SERPL-MCNC: 3.7 MMOL/L — SIGNIFICANT CHANGE UP (ref 3.5–5.3)
POTASSIUM SERPL-MCNC: 3.7 MMOL/L — SIGNIFICANT CHANGE UP (ref 3.5–5.3)
POTASSIUM SERPL-MCNC: 3.9 MMOL/L — SIGNIFICANT CHANGE UP (ref 3.5–5.3)
POTASSIUM SERPL-MCNC: 4 MMOL/L — SIGNIFICANT CHANGE UP (ref 3.5–5.3)
POTASSIUM SERPL-MCNC: 4.1 MMOL/L — SIGNIFICANT CHANGE UP (ref 3.5–5.3)
POTASSIUM SERPL-MCNC: 4.2 MMOL/L — SIGNIFICANT CHANGE UP (ref 3.5–5.3)
POTASSIUM SERPL-MCNC: 4.6 MMOL/L — SIGNIFICANT CHANGE UP (ref 3.5–5.3)
POTASSIUM SERPL-MCNC: 4.8 MMOL/L — SIGNIFICANT CHANGE UP (ref 3.5–5.3)
POTASSIUM SERPL-SCNC: 3.1 MMOL/L — LOW (ref 3.5–5.3)
POTASSIUM SERPL-SCNC: 3.3 MMOL/L — LOW (ref 3.5–5.3)
POTASSIUM SERPL-SCNC: 3.5 MMOL/L — SIGNIFICANT CHANGE UP (ref 3.5–5.3)
POTASSIUM SERPL-SCNC: 3.6 MMOL/L — SIGNIFICANT CHANGE UP (ref 3.5–5.3)
POTASSIUM SERPL-SCNC: 3.7 MMOL/L — SIGNIFICANT CHANGE UP (ref 3.5–5.3)
POTASSIUM SERPL-SCNC: 3.7 MMOL/L — SIGNIFICANT CHANGE UP (ref 3.5–5.3)
POTASSIUM SERPL-SCNC: 3.9 MMOL/L — SIGNIFICANT CHANGE UP (ref 3.5–5.3)
POTASSIUM SERPL-SCNC: 4 MMOL/L — SIGNIFICANT CHANGE UP (ref 3.5–5.3)
POTASSIUM SERPL-SCNC: 4.1 MMOL/L — SIGNIFICANT CHANGE UP (ref 3.5–5.3)
POTASSIUM SERPL-SCNC: 4.2 MMOL/L — SIGNIFICANT CHANGE UP (ref 3.5–5.3)
POTASSIUM SERPL-SCNC: 4.6 MMOL/L — SIGNIFICANT CHANGE UP (ref 3.5–5.3)
POTASSIUM SERPL-SCNC: 4.8 MMOL/L — SIGNIFICANT CHANGE UP (ref 3.5–5.3)
PROCALCITONIN SERPL-MCNC: 1.3 NG/ML — HIGH (ref 0.02–0.1)
PROCALCITONIN SERPL-MCNC: 7.55 NG/ML — HIGH (ref 0.02–0.1)
PROT ?TM UR-MCNC: 218 MG/DL — HIGH (ref 0–12)
PROT PATTERN SERPL ELPH-IMP: SIGNIFICANT CHANGE UP
PROT SERPL-MCNC: 4.5 G/DL — LOW (ref 6–8.3)
PROT SERPL-MCNC: 4.5 G/DL — LOW (ref 6–8.3)
PROT SERPL-MCNC: 4.8 G/DL — LOW (ref 6–8.3)
PROT SERPL-MCNC: 4.9 G/DL — LOW (ref 6–8.3)
PROT SERPL-MCNC: 5.2 G/DL — LOW (ref 6–8.3)
PROT SERPL-MCNC: 5.2 G/DL — LOW (ref 6–8.3)
PROT SERPL-MCNC: 5.3 G/DL — LOW (ref 6–8.3)
PROT SERPL-MCNC: 5.4 G/DL — LOW (ref 6–8.3)
PROT SERPL-MCNC: 5.5 G/DL — LOW (ref 6–8.3)
PROT SERPL-MCNC: 5.7 G/DL — LOW (ref 6–8.3)
PROT SERPL-MCNC: 5.7 G/DL — LOW (ref 6–8.3)
PROT SERPL-MCNC: 6.1 G/DL — SIGNIFICANT CHANGE UP (ref 6–8.3)
PROT UR-MCNC: 100
PROTHROM AB SERPL-ACNC: 15.2 SEC — HIGH (ref 10.6–13.6)
PROTHROM AB SERPL-ACNC: 16.4 SEC — HIGH (ref 10.6–13.6)
PROTHROM AB SERPL-ACNC: 18.2 SEC — HIGH (ref 10.6–13.6)
PROTHROM AB SERPL-ACNC: 20.3 SEC — HIGH (ref 10.6–13.6)
PROTHROM AB SERPL-ACNC: 21.2 SEC — HIGH (ref 10.6–13.6)
PROTHROM AB SERPL-ACNC: 22.4 SEC — HIGH (ref 10.6–13.6)
RBC # BLD: 1.94 M/UL — LOW (ref 4.2–5.8)
RBC # BLD: 1.95 M/UL — LOW (ref 4.2–5.8)
RBC # BLD: 2.14 M/UL — LOW (ref 4.2–5.8)
RBC # BLD: 2.18 M/UL — LOW (ref 4.2–5.8)
RBC # BLD: 2.19 M/UL — LOW (ref 4.2–5.8)
RBC # BLD: 2.23 M/UL — LOW (ref 4.2–5.8)
RBC # BLD: 2.32 M/UL — LOW (ref 4.2–5.8)
RBC # BLD: 2.39 M/UL — LOW (ref 4.2–5.8)
RBC # BLD: 2.42 M/UL — LOW (ref 4.2–5.8)
RBC # BLD: 2.49 M/UL — LOW (ref 4.2–5.8)
RBC # BLD: 2.49 M/UL — LOW (ref 4.2–5.8)
RBC # BLD: 2.52 M/UL — LOW (ref 4.2–5.8)
RBC # BLD: 2.53 M/UL — LOW (ref 4.2–5.8)
RBC # BLD: 2.62 M/UL — LOW (ref 4.2–5.8)
RBC # BLD: 2.76 M/UL — LOW (ref 4.2–5.8)
RBC # BLD: 2.83 M/UL — LOW (ref 4.2–5.8)
RBC # BLD: 2.98 M/UL — LOW (ref 4.2–5.8)
RBC # FLD: 15.9 % — HIGH (ref 10.3–14.5)
RBC # FLD: 16.2 % — HIGH (ref 10.3–14.5)
RBC # FLD: 16.4 % — HIGH (ref 10.3–14.5)
RBC # FLD: 16.5 % — HIGH (ref 10.3–14.5)
RBC # FLD: 16.6 % — HIGH (ref 10.3–14.5)
RBC # FLD: 16.7 % — HIGH (ref 10.3–14.5)
RBC # FLD: 16.8 % — HIGH (ref 10.3–14.5)
RBC # FLD: 16.9 % — HIGH (ref 10.3–14.5)
RBC # FLD: 16.9 % — HIGH (ref 10.3–14.5)
RBC # FLD: 17 % — HIGH (ref 10.3–14.5)
RBC # FLD: 17.1 % — HIGH (ref 10.3–14.5)
RBC # FLD: 17.1 % — HIGH (ref 10.3–14.5)
RBC # FLD: 17.7 % — HIGH (ref 10.3–14.5)
RBC BLD AUTO: ABNORMAL
RBC BLD AUTO: ABNORMAL
RBC CASTS # UR COMP ASSIST: ABNORMAL /HPF (ref 0–2)
S AUREUS DNA NOSE QL NAA+PROBE: SIGNIFICANT CHANGE UP
S AUREUS DNA NOSE QL NAA+PROBE: SIGNIFICANT CHANGE UP
SAO2 % BLDA: 77 % — LOW (ref 92–96)
SAO2 % BLDA: 83 % — LOW (ref 92–96)
SAO2 % BLDA: 86 % — LOW (ref 92–96)
SAO2 % BLDA: 86 % — LOW (ref 92–96)
SAO2 % BLDA: 90 % — LOW (ref 92–96)
SAO2 % BLDA: 91 % — LOW (ref 92–96)
SAO2 % BLDA: 91 % — LOW (ref 92–96)
SAO2 % BLDA: 95 % — SIGNIFICANT CHANGE UP (ref 92–96)
SAO2 % BLDA: 95 % — SIGNIFICANT CHANGE UP (ref 92–96)
SAO2 % BLDA: 99 % — HIGH (ref 92–96)
SAO2 % BLDV: 41 % — LOW (ref 67–88)
SAO2 % BLDV: 93 % — HIGH (ref 67–88)
SARS-COV-2 IGG SERPL QL IA: NEGATIVE — SIGNIFICANT CHANGE UP
SARS-COV-2 IGM SERPL IA-ACNC: 0.14 INDEX — SIGNIFICANT CHANGE UP
SARS-COV-2 RNA SPEC QL NAA+PROBE: SIGNIFICANT CHANGE UP
SARS-COV-2 RNA SPEC QL NAA+PROBE: SIGNIFICANT CHANGE UP
SCHISTOCYTES BLD QL AUTO: SLIGHT — SIGNIFICANT CHANGE UP
SCHISTOCYTES BLD QL AUTO: SLIGHT — SIGNIFICANT CHANGE UP
SODIUM SERPL-SCNC: 128 MMOL/L — LOW (ref 135–145)
SODIUM SERPL-SCNC: 128 MMOL/L — LOW (ref 135–145)
SODIUM SERPL-SCNC: 129 MMOL/L — LOW (ref 135–145)
SODIUM SERPL-SCNC: 134 MMOL/L — LOW (ref 135–145)
SODIUM SERPL-SCNC: 135 MMOL/L — SIGNIFICANT CHANGE UP (ref 135–145)
SODIUM SERPL-SCNC: 135 MMOL/L — SIGNIFICANT CHANGE UP (ref 135–145)
SODIUM SERPL-SCNC: 136 MMOL/L — SIGNIFICANT CHANGE UP (ref 135–145)
SODIUM SERPL-SCNC: 137 MMOL/L — SIGNIFICANT CHANGE UP (ref 135–145)
SODIUM SERPL-SCNC: 138 MMOL/L — SIGNIFICANT CHANGE UP (ref 135–145)
SODIUM UR-SCNC: 41 MMOL/L — SIGNIFICANT CHANGE UP
SP GR SPEC: 1.02 — SIGNIFICANT CHANGE UP (ref 1.01–1.02)
SPECIMEN SOURCE: SIGNIFICANT CHANGE UP
SRA INTERP SER-IMP: SIGNIFICANT CHANGE UP
T PALLIDUM AB TITR SER: NEGATIVE — SIGNIFICANT CHANGE UP
TIBC SERPL-MCNC: 142 UG/DL — LOW (ref 250–450)
TRIGL SERPL-MCNC: 83 MG/DL — SIGNIFICANT CHANGE UP
TROPONIN I SERPL-MCNC: 0.02 NG/ML — SIGNIFICANT CHANGE UP (ref 0–0.04)
TROPONIN I SERPL-MCNC: 0.02 NG/ML — SIGNIFICANT CHANGE UP (ref 0–0.04)
TROPONIN I SERPL-MCNC: 0.53 NG/ML — HIGH (ref 0–0.04)
TROPONIN I SERPL-MCNC: 0.81 NG/ML — HIGH (ref 0–0.04)
TROPONIN I SERPL-MCNC: <0.015 NG/ML — SIGNIFICANT CHANGE UP (ref 0–0.04)
TSH SERPL-MCNC: 2.01 UU/ML — SIGNIFICANT CHANGE UP (ref 0.34–4.82)
UIBC SERPL-MCNC: 118 UG/DL — SIGNIFICANT CHANGE UP (ref 110–370)
UROBILINOGEN FLD QL: NEGATIVE — SIGNIFICANT CHANGE UP
VIT B12 SERPL-MCNC: 1122 PG/ML — SIGNIFICANT CHANGE UP (ref 232–1245)
WBC # BLD: 10.15 K/UL — SIGNIFICANT CHANGE UP (ref 3.8–10.5)
WBC # BLD: 10.71 K/UL — HIGH (ref 3.8–10.5)
WBC # BLD: 11.25 K/UL — HIGH (ref 3.8–10.5)
WBC # BLD: 11.85 K/UL — HIGH (ref 3.8–10.5)
WBC # BLD: 11.92 K/UL — HIGH (ref 3.8–10.5)
WBC # BLD: 12.47 K/UL — HIGH (ref 3.8–10.5)
WBC # BLD: 13.04 K/UL — HIGH (ref 3.8–10.5)
WBC # BLD: 13.69 K/UL — HIGH (ref 3.8–10.5)
WBC # BLD: 13.88 K/UL — HIGH (ref 3.8–10.5)
WBC # BLD: 14.16 K/UL — HIGH (ref 3.8–10.5)
WBC # BLD: 14.27 K/UL — HIGH (ref 3.8–10.5)
WBC # BLD: 14.44 K/UL — HIGH (ref 3.8–10.5)
WBC # BLD: 14.45 K/UL — HIGH (ref 3.8–10.5)
WBC # BLD: 14.99 K/UL — HIGH (ref 3.8–10.5)
WBC # BLD: 15.77 K/UL — HIGH (ref 3.8–10.5)
WBC # BLD: 16.1 K/UL — HIGH (ref 3.8–10.5)
WBC # BLD: 9.71 K/UL — SIGNIFICANT CHANGE UP (ref 3.8–10.5)
WBC # FLD AUTO: 10.15 K/UL — SIGNIFICANT CHANGE UP (ref 3.8–10.5)
WBC # FLD AUTO: 10.71 K/UL — HIGH (ref 3.8–10.5)
WBC # FLD AUTO: 11.25 K/UL — HIGH (ref 3.8–10.5)
WBC # FLD AUTO: 11.85 K/UL — HIGH (ref 3.8–10.5)
WBC # FLD AUTO: 11.92 K/UL — HIGH (ref 3.8–10.5)
WBC # FLD AUTO: 12.47 K/UL — HIGH (ref 3.8–10.5)
WBC # FLD AUTO: 13.04 K/UL — HIGH (ref 3.8–10.5)
WBC # FLD AUTO: 13.69 K/UL — HIGH (ref 3.8–10.5)
WBC # FLD AUTO: 13.88 K/UL — HIGH (ref 3.8–10.5)
WBC # FLD AUTO: 14.16 K/UL — HIGH (ref 3.8–10.5)
WBC # FLD AUTO: 14.27 K/UL — HIGH (ref 3.8–10.5)
WBC # FLD AUTO: 14.44 K/UL — HIGH (ref 3.8–10.5)
WBC # FLD AUTO: 14.45 K/UL — HIGH (ref 3.8–10.5)
WBC # FLD AUTO: 14.99 K/UL — HIGH (ref 3.8–10.5)
WBC # FLD AUTO: 15.77 K/UL — HIGH (ref 3.8–10.5)
WBC # FLD AUTO: 16.1 K/UL — HIGH (ref 3.8–10.5)
WBC # FLD AUTO: 9.71 K/UL — SIGNIFICANT CHANGE UP (ref 3.8–10.5)
WBC UR QL: SIGNIFICANT CHANGE UP /HPF (ref 0–5)

## 2020-01-01 PROCEDURE — 83540 ASSAY OF IRON: CPT

## 2020-01-01 PROCEDURE — 86901 BLOOD TYPING SEROLOGIC RH(D): CPT

## 2020-01-01 PROCEDURE — 71045 X-RAY EXAM CHEST 1 VIEW: CPT | Mod: 26

## 2020-01-01 PROCEDURE — 70450 CT HEAD/BRAIN W/O DYE: CPT | Mod: 26

## 2020-01-01 PROCEDURE — 80053 COMPREHEN METABOLIC PANEL: CPT

## 2020-01-01 PROCEDURE — 84443 ASSAY THYROID STIM HORMONE: CPT

## 2020-01-01 PROCEDURE — 83880 ASSAY OF NATRIURETIC PEPTIDE: CPT

## 2020-01-01 PROCEDURE — 85610 PROTHROMBIN TIME: CPT

## 2020-01-01 PROCEDURE — 84156 ASSAY OF PROTEIN URINE: CPT

## 2020-01-01 PROCEDURE — 86900 BLOOD TYPING SEROLOGIC ABO: CPT

## 2020-01-01 PROCEDURE — 85652 RBC SED RATE AUTOMATED: CPT

## 2020-01-01 PROCEDURE — 83605 ASSAY OF LACTIC ACID: CPT

## 2020-01-01 PROCEDURE — P9011: CPT

## 2020-01-01 PROCEDURE — 86769 SARS-COV-2 COVID-19 ANTIBODY: CPT

## 2020-01-01 PROCEDURE — 71045 X-RAY EXAM CHEST 1 VIEW: CPT | Mod: 26,76

## 2020-01-01 PROCEDURE — 94640 AIRWAY INHALATION TREATMENT: CPT

## 2020-01-01 PROCEDURE — 84105 ASSAY OF URINE PHOSPHORUS: CPT

## 2020-01-01 PROCEDURE — 84155 ASSAY OF PROTEIN SERUM: CPT

## 2020-01-01 PROCEDURE — 83615 LACTATE (LD) (LDH) ENZYME: CPT

## 2020-01-01 PROCEDURE — 85730 THROMBOPLASTIN TIME PARTIAL: CPT

## 2020-01-01 PROCEDURE — 83521 IG LIGHT CHAINS FREE EACH: CPT

## 2020-01-01 PROCEDURE — 87040 BLOOD CULTURE FOR BACTERIA: CPT

## 2020-01-01 PROCEDURE — 71045 X-RAY EXAM CHEST 1 VIEW: CPT

## 2020-01-01 PROCEDURE — 87086 URINE CULTURE/COLONY COUNT: CPT

## 2020-01-01 PROCEDURE — 86850 RBC ANTIBODY SCREEN: CPT

## 2020-01-01 PROCEDURE — 86038 ANTINUCLEAR ANTIBODIES: CPT

## 2020-01-01 PROCEDURE — 82803 BLOOD GASES ANY COMBINATION: CPT

## 2020-01-01 PROCEDURE — 82247 BILIRUBIN TOTAL: CPT

## 2020-01-01 PROCEDURE — 71250 CT THORAX DX C-: CPT | Mod: 26

## 2020-01-01 PROCEDURE — 96374 THER/PROPH/DIAG INJ IV PUSH: CPT

## 2020-01-01 PROCEDURE — 86160 COMPLEMENT ANTIGEN: CPT

## 2020-01-01 PROCEDURE — 81001 URINALYSIS AUTO W/SCOPE: CPT

## 2020-01-01 PROCEDURE — 80048 BASIC METABOLIC PNL TOTAL CA: CPT

## 2020-01-01 PROCEDURE — 93005 ELECTROCARDIOGRAM TRACING: CPT

## 2020-01-01 PROCEDURE — P9040: CPT

## 2020-01-01 PROCEDURE — 99291 CRITICAL CARE FIRST HOUR: CPT

## 2020-01-01 PROCEDURE — 84300 ASSAY OF URINE SODIUM: CPT

## 2020-01-01 PROCEDURE — 83010 ASSAY OF HAPTOGLOBIN QUANT: CPT

## 2020-01-01 PROCEDURE — P9047: CPT

## 2020-01-01 PROCEDURE — 84100 ASSAY OF PHOSPHORUS: CPT

## 2020-01-01 PROCEDURE — 71250 CT THORAX DX C-: CPT

## 2020-01-01 PROCEDURE — 36430 TRANSFUSION BLD/BLD COMPNT: CPT

## 2020-01-01 PROCEDURE — 84145 PROCALCITONIN (PCT): CPT

## 2020-01-01 PROCEDURE — 82570 ASSAY OF URINE CREATININE: CPT

## 2020-01-01 PROCEDURE — 80061 LIPID PANEL: CPT

## 2020-01-01 PROCEDURE — 97110 THERAPEUTIC EXERCISES: CPT

## 2020-01-01 PROCEDURE — 70450 CT HEAD/BRAIN W/O DYE: CPT

## 2020-01-01 PROCEDURE — 83935 ASSAY OF URINE OSMOLALITY: CPT

## 2020-01-01 PROCEDURE — 84165 PROTEIN E-PHORESIS SERUM: CPT

## 2020-01-01 PROCEDURE — 85027 COMPLETE CBC AUTOMATED: CPT

## 2020-01-01 PROCEDURE — 94760 N-INVAS EAR/PLS OXIMETRY 1: CPT

## 2020-01-01 PROCEDURE — 87070 CULTURE OTHR SPECIMN AEROBIC: CPT

## 2020-01-01 PROCEDURE — 71045 X-RAY EXAM CHEST 1 VIEW: CPT | Mod: 26,77

## 2020-01-01 PROCEDURE — 82607 VITAMIN B-12: CPT

## 2020-01-01 PROCEDURE — 85025 COMPLETE CBC W/AUTO DIFF WBC: CPT

## 2020-01-01 PROCEDURE — 87641 MR-STAPH DNA AMP PROBE: CPT

## 2020-01-01 PROCEDURE — 85379 FIBRIN DEGRADATION QUANT: CPT

## 2020-01-01 PROCEDURE — 97530 THERAPEUTIC ACTIVITIES: CPT

## 2020-01-01 PROCEDURE — 83735 ASSAY OF MAGNESIUM: CPT

## 2020-01-01 PROCEDURE — 86140 C-REACTIVE PROTEIN: CPT

## 2020-01-01 PROCEDURE — 82248 BILIRUBIN DIRECT: CPT

## 2020-01-01 PROCEDURE — 83550 IRON BINDING TEST: CPT

## 2020-01-01 PROCEDURE — 99233 SBSQ HOSP IP/OBS HIGH 50: CPT

## 2020-01-01 PROCEDURE — 82962 GLUCOSE BLOOD TEST: CPT

## 2020-01-01 PROCEDURE — 86022 PLATELET ANTIBODIES: CPT

## 2020-01-01 PROCEDURE — P9059: CPT

## 2020-01-01 PROCEDURE — 80074 ACUTE HEPATITIS PANEL: CPT

## 2020-01-01 PROCEDURE — 94660 CPAP INITIATION&MGMT: CPT

## 2020-01-01 PROCEDURE — 87640 STAPH A DNA AMP PROBE: CPT

## 2020-01-01 PROCEDURE — 86923 COMPATIBILITY TEST ELECTRIC: CPT

## 2020-01-01 PROCEDURE — 74176 CT ABD & PELVIS W/O CONTRAST: CPT

## 2020-01-01 PROCEDURE — 86036 ANCA SCREEN EACH ANTIBODY: CPT

## 2020-01-01 PROCEDURE — 83516 IMMUNOASSAY NONANTIBODY: CPT

## 2020-01-01 PROCEDURE — 96375 TX/PRO/DX INJ NEW DRUG ADDON: CPT

## 2020-01-01 PROCEDURE — 86985 SPLIT BLOOD OR PRODUCTS: CPT

## 2020-01-01 PROCEDURE — 93306 TTE W/DOPPLER COMPLETE: CPT

## 2020-01-01 PROCEDURE — 87635 SARS-COV-2 COVID-19 AMP PRB: CPT

## 2020-01-01 PROCEDURE — 97116 GAIT TRAINING THERAPY: CPT

## 2020-01-01 PROCEDURE — 94002 VENT MGMT INPAT INIT DAY: CPT

## 2020-01-01 PROCEDURE — 94003 VENT MGMT INPAT SUBQ DAY: CPT

## 2020-01-01 PROCEDURE — 99223 1ST HOSP IP/OBS HIGH 75: CPT

## 2020-01-01 PROCEDURE — 86780 TREPONEMA PALLIDUM: CPT

## 2020-01-01 PROCEDURE — 36415 COLL VENOUS BLD VENIPUNCTURE: CPT

## 2020-01-01 PROCEDURE — 83036 HEMOGLOBIN GLYCOSYLATED A1C: CPT

## 2020-01-01 PROCEDURE — 82746 ASSAY OF FOLIC ACID SERUM: CPT

## 2020-01-01 PROCEDURE — 84484 ASSAY OF TROPONIN QUANT: CPT

## 2020-01-01 PROCEDURE — 86334 IMMUNOFIX E-PHORESIS SERUM: CPT

## 2020-01-01 PROCEDURE — 85384 FIBRINOGEN ACTIVITY: CPT

## 2020-01-01 PROCEDURE — 74176 CT ABD & PELVIS W/O CONTRAST: CPT | Mod: 26

## 2020-01-01 PROCEDURE — 99261: CPT

## 2020-01-01 RX ORDER — HEPARIN SODIUM 5000 [USP'U]/ML
5000 INJECTION INTRAVENOUS; SUBCUTANEOUS EVERY 12 HOURS
Refills: 0 | Status: DISCONTINUED | OUTPATIENT
Start: 2020-01-01 | End: 2020-01-01

## 2020-01-01 RX ORDER — NOREPINEPHRINE BITARTRATE/D5W 8 MG/250ML
1 PLASTIC BAG, INJECTION (ML) INTRAVENOUS
Qty: 16 | Refills: 0 | Status: DISCONTINUED | OUTPATIENT
Start: 2020-01-01 | End: 2020-01-01

## 2020-01-01 RX ORDER — ROBINUL 0.2 MG/ML
0.4 INJECTION INTRAMUSCULAR; INTRAVENOUS ONCE
Refills: 0 | Status: COMPLETED | OUTPATIENT
Start: 2020-01-01 | End: 2020-01-01

## 2020-01-01 RX ORDER — TAMSULOSIN HYDROCHLORIDE 0.4 MG/1
0.4 CAPSULE ORAL AT BEDTIME
Refills: 0 | Status: DISCONTINUED | OUTPATIENT
Start: 2020-01-01 | End: 2020-01-01

## 2020-01-01 RX ORDER — PHENYLEPHRINE HYDROCHLORIDE 10 MG/ML
0.1 INJECTION INTRAVENOUS
Qty: 160 | Refills: 0 | Status: DISCONTINUED | OUTPATIENT
Start: 2020-01-01 | End: 2020-01-01

## 2020-01-01 RX ORDER — POLYETHYLENE GLYCOL 3350 17 G/17G
17 POWDER, FOR SOLUTION ORAL DAILY
Refills: 0 | Status: DISCONTINUED | OUTPATIENT
Start: 2020-01-01 | End: 2020-01-01

## 2020-01-01 RX ORDER — FUROSEMIDE 40 MG
10 TABLET ORAL
Qty: 100 | Refills: 0 | Status: DISCONTINUED | OUTPATIENT
Start: 2020-01-01 | End: 2020-01-01

## 2020-01-01 RX ORDER — VANCOMYCIN HCL 1 G
1000 VIAL (EA) INTRAVENOUS ONCE
Refills: 0 | Status: COMPLETED | OUTPATIENT
Start: 2020-01-01 | End: 2020-01-01

## 2020-01-01 RX ORDER — NOREPINEPHRINE BITARTRATE/D5W 8 MG/250ML
0.85 PLASTIC BAG, INJECTION (ML) INTRAVENOUS
Qty: 32 | Refills: 0 | Status: DISCONTINUED | OUTPATIENT
Start: 2020-01-01 | End: 2020-01-01

## 2020-01-01 RX ORDER — HEPARIN SODIUM 5000 [USP'U]/ML
5000 INJECTION INTRAVENOUS; SUBCUTANEOUS EVERY 8 HOURS
Refills: 0 | Status: DISCONTINUED | OUTPATIENT
Start: 2020-01-01 | End: 2020-01-01

## 2020-01-01 RX ORDER — FUROSEMIDE 40 MG
40 TABLET ORAL EVERY 12 HOURS
Refills: 0 | Status: DISCONTINUED | OUTPATIENT
Start: 2020-01-01 | End: 2020-01-01

## 2020-01-01 RX ORDER — HEPARIN SODIUM 5000 [USP'U]/ML
INJECTION INTRAVENOUS; SUBCUTANEOUS
Qty: 25000 | Refills: 0 | Status: DISCONTINUED | OUTPATIENT
Start: 2020-01-01 | End: 2020-01-01

## 2020-01-01 RX ORDER — MECLIZINE HCL 12.5 MG
1 TABLET ORAL
Qty: 0 | Refills: 0 | DISCHARGE

## 2020-01-01 RX ORDER — FINASTERIDE 5 MG/1
1 TABLET, FILM COATED ORAL
Qty: 0 | Refills: 0 | DISCHARGE

## 2020-01-01 RX ORDER — FINASTERIDE 5 MG/1
5 TABLET, FILM COATED ORAL DAILY
Refills: 0 | Status: DISCONTINUED | OUTPATIENT
Start: 2020-01-01 | End: 2020-01-01

## 2020-01-01 RX ORDER — NOREPINEPHRINE BITARTRATE/D5W 8 MG/250ML
0.05 PLASTIC BAG, INJECTION (ML) INTRAVENOUS
Qty: 16 | Refills: 0 | Status: DISCONTINUED | OUTPATIENT
Start: 2020-01-01 | End: 2020-01-01

## 2020-01-01 RX ORDER — VASOPRESSIN 20 [USP'U]/ML
0.04 INJECTION INTRAVENOUS
Qty: 50 | Refills: 0 | Status: DISCONTINUED | OUTPATIENT
Start: 2020-01-01 | End: 2020-01-01

## 2020-01-01 RX ORDER — SODIUM BICARBONATE 1 MEQ/ML
650 SYRINGE (ML) INTRAVENOUS EVERY 8 HOURS
Refills: 0 | Status: DISCONTINUED | OUTPATIENT
Start: 2020-01-01 | End: 2020-01-01

## 2020-01-01 RX ORDER — FUROSEMIDE 40 MG
80 TABLET ORAL ONCE
Refills: 0 | Status: COMPLETED | OUTPATIENT
Start: 2020-01-01 | End: 2020-01-01

## 2020-01-01 RX ORDER — AMLODIPINE BESYLATE 2.5 MG/1
1 TABLET ORAL
Qty: 0 | Refills: 0 | DISCHARGE

## 2020-01-01 RX ORDER — FUROSEMIDE 40 MG
40 TABLET ORAL ONCE
Refills: 0 | Status: DISCONTINUED | OUTPATIENT
Start: 2020-01-01 | End: 2020-01-01

## 2020-01-01 RX ORDER — RANOLAZINE 500 MG/1
500 TABLET, FILM COATED, EXTENDED RELEASE ORAL
Refills: 0 | Status: DISCONTINUED | OUTPATIENT
Start: 2020-01-01 | End: 2020-01-01

## 2020-01-01 RX ORDER — FENTANYL CITRATE 50 UG/ML
50 INJECTION INTRAVENOUS ONCE
Refills: 0 | Status: DISCONTINUED | OUTPATIENT
Start: 2020-01-01 | End: 2020-01-01

## 2020-01-01 RX ORDER — HEPARIN SODIUM 5000 [USP'U]/ML
3000 INJECTION INTRAVENOUS; SUBCUTANEOUS EVERY 6 HOURS
Refills: 0 | Status: DISCONTINUED | OUTPATIENT
Start: 2020-01-01 | End: 2020-01-01

## 2020-01-01 RX ORDER — BUMETANIDE 0.25 MG/ML
76 INJECTION INTRAMUSCULAR; INTRAVENOUS
Qty: 20 | Refills: 0 | Status: DISCONTINUED | OUTPATIENT
Start: 2020-01-01 | End: 2020-01-01

## 2020-01-01 RX ORDER — METOCLOPRAMIDE HCL 10 MG
10 TABLET ORAL ONCE
Refills: 0 | Status: COMPLETED | OUTPATIENT
Start: 2020-01-01 | End: 2020-01-01

## 2020-01-01 RX ORDER — APIXABAN 2.5 MG/1
1 TABLET, FILM COATED ORAL
Qty: 0 | Refills: 0 | DISCHARGE

## 2020-01-01 RX ORDER — CALCITRIOL 0.5 UG/1
1 CAPSULE ORAL
Qty: 0 | Refills: 0 | DISCHARGE

## 2020-01-01 RX ORDER — PHENYLEPHRINE HYDROCHLORIDE 10 MG/ML
0.3 INJECTION INTRAVENOUS
Qty: 160 | Refills: 0 | Status: DISCONTINUED | OUTPATIENT
Start: 2020-01-01 | End: 2020-01-01

## 2020-01-01 RX ORDER — ALBUMIN HUMAN 25 %
50 VIAL (ML) INTRAVENOUS ONCE
Refills: 0 | Status: COMPLETED | OUTPATIENT
Start: 2020-01-01 | End: 2020-01-01

## 2020-01-01 RX ORDER — DEXMEDETOMIDINE HYDROCHLORIDE IN 0.9% SODIUM CHLORIDE 4 UG/ML
0.2 INJECTION INTRAVENOUS
Qty: 400 | Refills: 0 | Status: DISCONTINUED | OUTPATIENT
Start: 2020-01-01 | End: 2020-01-01

## 2020-01-01 RX ORDER — FENTANYL CITRATE 50 UG/ML
1.5 INJECTION INTRAVENOUS
Qty: 2500 | Refills: 0 | Status: DISCONTINUED | OUTPATIENT
Start: 2020-01-01 | End: 2020-01-01

## 2020-01-01 RX ORDER — LIPASE/PROTEASE/AMYLASE 16-48-48K
1 CAPSULE,DELAYED RELEASE (ENTERIC COATED) ORAL
Qty: 0 | Refills: 0 | DISCHARGE

## 2020-01-01 RX ORDER — ROBINUL 0.2 MG/ML
0.4 INJECTION INTRAMUSCULAR; INTRAVENOUS EVERY 4 HOURS
Refills: 0 | Status: DISCONTINUED | OUTPATIENT
Start: 2020-01-01 | End: 2020-01-01

## 2020-01-01 RX ORDER — RANOLAZINE 500 MG/1
1 TABLET, FILM COATED, EXTENDED RELEASE ORAL
Qty: 0 | Refills: 0 | DISCHARGE

## 2020-01-01 RX ORDER — CHLORHEXIDINE GLUCONATE 213 G/1000ML
1 SOLUTION TOPICAL
Refills: 0 | Status: DISCONTINUED | OUTPATIENT
Start: 2020-01-01 | End: 2020-01-01

## 2020-01-01 RX ORDER — LATANOPROST 0.05 MG/ML
1 SOLUTION/ DROPS OPHTHALMIC; TOPICAL
Qty: 0 | Refills: 0 | DISCHARGE

## 2020-01-01 RX ORDER — HEPARIN SODIUM 5000 [USP'U]/ML
6500 INJECTION INTRAVENOUS; SUBCUTANEOUS EVERY 6 HOURS
Refills: 0 | Status: DISCONTINUED | OUTPATIENT
Start: 2020-01-01 | End: 2020-01-01

## 2020-01-01 RX ORDER — ALBUTEROL 90 UG/1
2 AEROSOL, METERED ORAL EVERY 6 HOURS
Refills: 0 | Status: DISCONTINUED | OUTPATIENT
Start: 2020-01-01 | End: 2020-01-01

## 2020-01-01 RX ORDER — ACETAMINOPHEN 500 MG
650 TABLET ORAL ONCE
Refills: 0 | Status: COMPLETED | OUTPATIENT
Start: 2020-01-01 | End: 2020-01-01

## 2020-01-01 RX ORDER — MIDODRINE HYDROCHLORIDE 2.5 MG/1
10 TABLET ORAL EVERY 8 HOURS
Refills: 0 | Status: DISCONTINUED | OUTPATIENT
Start: 2020-01-01 | End: 2020-01-01

## 2020-01-01 RX ORDER — SODIUM BICARBONATE 1 MEQ/ML
100 SYRINGE (ML) INTRAVENOUS ONCE
Refills: 0 | Status: COMPLETED | OUTPATIENT
Start: 2020-01-01 | End: 2020-01-01

## 2020-01-01 RX ORDER — FUROSEMIDE 40 MG
160 TABLET ORAL ONCE
Refills: 0 | Status: COMPLETED | OUTPATIENT
Start: 2020-01-01 | End: 2020-01-01

## 2020-01-01 RX ORDER — FUROSEMIDE 40 MG
80 TABLET ORAL EVERY 12 HOURS
Refills: 0 | Status: DISCONTINUED | OUTPATIENT
Start: 2020-01-01 | End: 2020-01-01

## 2020-01-01 RX ORDER — PIPERACILLIN AND TAZOBACTAM 4; .5 G/20ML; G/20ML
3.38 INJECTION, POWDER, LYOPHILIZED, FOR SOLUTION INTRAVENOUS ONCE
Refills: 0 | Status: COMPLETED | OUTPATIENT
Start: 2020-01-01 | End: 2020-01-01

## 2020-01-01 RX ORDER — BUMETANIDE 0.25 MG/ML
1 INJECTION INTRAMUSCULAR; INTRAVENOUS
Qty: 20 | Refills: 0 | Status: DISCONTINUED | OUTPATIENT
Start: 2020-01-01 | End: 2020-01-01

## 2020-01-01 RX ORDER — LACTULOSE 10 G/15ML
10 SOLUTION ORAL DAILY
Refills: 0 | Status: DISCONTINUED | OUTPATIENT
Start: 2020-01-01 | End: 2020-01-01

## 2020-01-01 RX ORDER — PROPOFOL 10 MG/ML
10 INJECTION, EMULSION INTRAVENOUS
Qty: 500 | Refills: 0 | Status: DISCONTINUED | OUTPATIENT
Start: 2020-01-01 | End: 2020-01-01

## 2020-01-01 RX ORDER — TAMSULOSIN HYDROCHLORIDE 0.4 MG/1
1 CAPSULE ORAL
Qty: 0 | Refills: 0 | DISCHARGE

## 2020-01-01 RX ORDER — LIPASE/PROTEASE/AMYLASE 16-48-48K
1 CAPSULE,DELAYED RELEASE (ENTERIC COATED) ORAL
Refills: 0 | Status: DISCONTINUED | OUTPATIENT
Start: 2020-01-01 | End: 2020-01-01

## 2020-01-01 RX ORDER — VASOPRESSIN 20 [USP'U]/ML
0.02 INJECTION INTRAVENOUS
Qty: 50 | Refills: 0 | Status: DISCONTINUED | OUTPATIENT
Start: 2020-01-01 | End: 2020-01-01

## 2020-01-01 RX ORDER — FENTANYL CITRATE 50 UG/ML
25 INJECTION INTRAVENOUS ONCE
Refills: 0 | Status: DISCONTINUED | OUTPATIENT
Start: 2020-01-01 | End: 2020-01-01

## 2020-01-01 RX ORDER — SENNA PLUS 8.6 MG/1
2 TABLET ORAL AT BEDTIME
Refills: 0 | Status: DISCONTINUED | OUTPATIENT
Start: 2020-01-01 | End: 2020-01-01

## 2020-01-01 RX ORDER — ALBUMIN HUMAN 25 %
100 VIAL (ML) INTRAVENOUS EVERY 6 HOURS
Refills: 0 | Status: COMPLETED | OUTPATIENT
Start: 2020-01-01 | End: 2020-01-01

## 2020-01-01 RX ORDER — PHENYLEPHRINE HYDROCHLORIDE 10 MG/ML
1 INJECTION INTRAVENOUS
Qty: 160 | Refills: 0 | Status: DISCONTINUED | OUTPATIENT
Start: 2020-01-01 | End: 2020-01-01

## 2020-01-01 RX ORDER — PANTOPRAZOLE SODIUM 20 MG/1
40 TABLET, DELAYED RELEASE ORAL DAILY
Refills: 0 | Status: DISCONTINUED | OUTPATIENT
Start: 2020-01-01 | End: 2020-01-01

## 2020-01-01 RX ORDER — NOREPINEPHRINE BITARTRATE/D5W 8 MG/250ML
0.1 PLASTIC BAG, INJECTION (ML) INTRAVENOUS
Qty: 16 | Refills: 0 | Status: DISCONTINUED | OUTPATIENT
Start: 2020-01-01 | End: 2020-01-01

## 2020-01-01 RX ORDER — LIPASE/PROTEASE/AMYLASE 16-48-48K
36000 CAPSULE,DELAYED RELEASE (ENTERIC COATED) ORAL
Refills: 0 | Status: DISCONTINUED | OUTPATIENT
Start: 2020-01-01 | End: 2020-01-01

## 2020-01-01 RX ORDER — FENTANYL CITRATE 50 UG/ML
0.5 INJECTION INTRAVENOUS
Qty: 2500 | Refills: 0 | Status: DISCONTINUED | OUTPATIENT
Start: 2020-01-01 | End: 2020-01-01

## 2020-01-01 RX ORDER — PIPERACILLIN AND TAZOBACTAM 4; .5 G/20ML; G/20ML
3.38 INJECTION, POWDER, LYOPHILIZED, FOR SOLUTION INTRAVENOUS EVERY 12 HOURS
Refills: 0 | Status: COMPLETED | OUTPATIENT
Start: 2020-01-01 | End: 2020-01-01

## 2020-01-01 RX ORDER — CHLORHEXIDINE GLUCONATE 213 G/1000ML
15 SOLUTION TOPICAL EVERY 12 HOURS
Refills: 0 | Status: DISCONTINUED | OUTPATIENT
Start: 2020-01-01 | End: 2020-01-01

## 2020-01-01 RX ORDER — PHYTONADIONE (VIT K1) 5 MG
5 TABLET ORAL ONCE
Refills: 0 | Status: COMPLETED | OUTPATIENT
Start: 2020-01-01 | End: 2020-01-01

## 2020-01-01 RX ORDER — PHYTONADIONE (VIT K1) 5 MG
2.5 TABLET ORAL ONCE
Refills: 0 | Status: COMPLETED | OUTPATIENT
Start: 2020-01-01 | End: 2020-01-01

## 2020-01-01 RX ORDER — SODIUM CHLORIDE 9 MG/ML
1000 INJECTION INTRAMUSCULAR; INTRAVENOUS; SUBCUTANEOUS ONCE
Refills: 0 | Status: COMPLETED | OUTPATIENT
Start: 2020-01-01 | End: 2020-01-01

## 2020-01-01 RX ORDER — LIPASE/PROTEASE/AMYLASE 16-48-48K
6 CAPSULE,DELAYED RELEASE (ENTERIC COATED) ORAL
Refills: 0 | Status: DISCONTINUED | OUTPATIENT
Start: 2020-01-01 | End: 2020-01-01

## 2020-01-01 RX ORDER — FENTANYL CITRATE 50 UG/ML
1 INJECTION INTRAVENOUS
Qty: 2500 | Refills: 0 | Status: DISCONTINUED | OUTPATIENT
Start: 2020-01-01 | End: 2020-01-01

## 2020-01-01 RX ORDER — VANCOMYCIN HCL 1 G
500 VIAL (EA) INTRAVENOUS ONCE
Refills: 0 | Status: COMPLETED | OUTPATIENT
Start: 2020-01-01 | End: 2020-01-01

## 2020-01-01 RX ORDER — ETOMIDATE 2 MG/ML
10 INJECTION INTRAVENOUS ONCE
Refills: 0 | Status: COMPLETED | OUTPATIENT
Start: 2020-01-01 | End: 2020-01-01

## 2020-01-01 RX ORDER — FENTANYL CITRATE 50 UG/ML
3 INJECTION INTRAVENOUS
Qty: 2500 | Refills: 0 | Status: DISCONTINUED | OUTPATIENT
Start: 2020-01-01 | End: 2020-01-01

## 2020-01-01 RX ORDER — DEXMEDETOMIDINE HYDROCHLORIDE IN 0.9% SODIUM CHLORIDE 4 UG/ML
0.5 INJECTION INTRAVENOUS
Qty: 200 | Refills: 0 | Status: DISCONTINUED | OUTPATIENT
Start: 2020-01-01 | End: 2020-01-01

## 2020-01-01 RX ORDER — DEXMEDETOMIDINE HYDROCHLORIDE IN 0.9% SODIUM CHLORIDE 4 UG/ML
1 INJECTION INTRAVENOUS
Qty: 200 | Refills: 0 | Status: DISCONTINUED | OUTPATIENT
Start: 2020-01-01 | End: 2020-01-01

## 2020-01-01 RX ADMIN — Medication 650 MILLIGRAM(S): at 21:20

## 2020-01-01 RX ADMIN — FENTANYL CITRATE 7.65 MICROGRAM(S)/KG/HR: 50 INJECTION INTRAVENOUS at 11:20

## 2020-01-01 RX ADMIN — CHLORHEXIDINE GLUCONATE 1 APPLICATION(S): 213 SOLUTION TOPICAL at 05:09

## 2020-01-01 RX ADMIN — VASOPRESSIN 2.4 UNIT(S)/MIN: 20 INJECTION INTRAVENOUS at 05:14

## 2020-01-01 RX ADMIN — PANTOPRAZOLE SODIUM 40 MILLIGRAM(S): 20 TABLET, DELAYED RELEASE ORAL at 12:42

## 2020-01-01 RX ADMIN — Medication 50 MILLILITER(S): at 10:00

## 2020-01-01 RX ADMIN — FENTANYL CITRATE 3.83 MICROGRAM(S)/KG/HR: 50 INJECTION INTRAVENOUS at 21:44

## 2020-01-01 RX ADMIN — DEXMEDETOMIDINE HYDROCHLORIDE IN 0.9% SODIUM CHLORIDE 3.83 MICROGRAM(S)/KG/HR: 4 INJECTION INTRAVENOUS at 20:26

## 2020-01-01 RX ADMIN — CHLORHEXIDINE GLUCONATE 15 MILLILITER(S): 213 SOLUTION TOPICAL at 18:27

## 2020-01-01 RX ADMIN — FENTANYL CITRATE 25 MICROGRAM(S): 50 INJECTION INTRAVENOUS at 08:49

## 2020-01-01 RX ADMIN — HEPARIN SODIUM 5000 UNIT(S): 5000 INJECTION INTRAVENOUS; SUBCUTANEOUS at 21:03

## 2020-01-01 RX ADMIN — DEXMEDETOMIDINE HYDROCHLORIDE IN 0.9% SODIUM CHLORIDE 9.56 MICROGRAM(S)/KG/HR: 4 INJECTION INTRAVENOUS at 15:21

## 2020-01-01 RX ADMIN — POLYETHYLENE GLYCOL 3350 17 GRAM(S): 17 POWDER, FOR SOLUTION ORAL at 12:35

## 2020-01-01 RX ADMIN — ALBUTEROL 2 PUFF(S): 90 AEROSOL, METERED ORAL at 15:20

## 2020-01-01 RX ADMIN — PIPERACILLIN AND TAZOBACTAM 25 GRAM(S): 4; .5 INJECTION, POWDER, LYOPHILIZED, FOR SOLUTION INTRAVENOUS at 17:03

## 2020-01-01 RX ADMIN — FINASTERIDE 5 MILLIGRAM(S): 5 TABLET, FILM COATED ORAL at 11:51

## 2020-01-01 RX ADMIN — HEPARIN SODIUM 5000 UNIT(S): 5000 INJECTION INTRAVENOUS; SUBCUTANEOUS at 05:07

## 2020-01-01 RX ADMIN — FENTANYL CITRATE 15.3 MICROGRAM(S)/KG/HR: 50 INJECTION INTRAVENOUS at 11:00

## 2020-01-01 RX ADMIN — DEXMEDETOMIDINE HYDROCHLORIDE IN 0.9% SODIUM CHLORIDE 9.56 MICROGRAM(S)/KG/HR: 4 INJECTION INTRAVENOUS at 09:00

## 2020-01-01 RX ADMIN — VASOPRESSIN 2.4 UNIT(S)/MIN: 20 INJECTION INTRAVENOUS at 03:24

## 2020-01-01 RX ADMIN — MIDODRINE HYDROCHLORIDE 10 MILLIGRAM(S): 2.5 TABLET ORAL at 05:34

## 2020-01-01 RX ADMIN — ALBUTEROL 2 PUFF(S): 90 AEROSOL, METERED ORAL at 05:17

## 2020-01-01 RX ADMIN — FENTANYL CITRATE 23 MICROGRAM(S)/KG/HR: 50 INJECTION INTRAVENOUS at 13:45

## 2020-01-01 RX ADMIN — HEPARIN SODIUM 5000 UNIT(S): 5000 INJECTION INTRAVENOUS; SUBCUTANEOUS at 15:07

## 2020-01-01 RX ADMIN — HEPARIN SODIUM 5000 UNIT(S): 5000 INJECTION INTRAVENOUS; SUBCUTANEOUS at 05:34

## 2020-01-01 RX ADMIN — Medication 650 MILLIGRAM(S): at 08:24

## 2020-01-01 RX ADMIN — CHLORHEXIDINE GLUCONATE 15 MILLILITER(S): 213 SOLUTION TOPICAL at 05:32

## 2020-01-01 RX ADMIN — FENTANYL CITRATE 3.83 MICROGRAM(S)/KG/HR: 50 INJECTION INTRAVENOUS at 11:08

## 2020-01-01 RX ADMIN — VASOPRESSIN 2.4 UNIT(S)/MIN: 20 INJECTION INTRAVENOUS at 00:00

## 2020-01-01 RX ADMIN — Medication 50 MILLILITER(S): at 00:30

## 2020-01-01 RX ADMIN — DEXMEDETOMIDINE HYDROCHLORIDE IN 0.9% SODIUM CHLORIDE 9.56 MICROGRAM(S)/KG/HR: 4 INJECTION INTRAVENOUS at 18:39

## 2020-01-01 RX ADMIN — MIDODRINE HYDROCHLORIDE 10 MILLIGRAM(S): 2.5 TABLET ORAL at 19:30

## 2020-01-01 RX ADMIN — Medication 3.59 MICROGRAM(S)/KG/MIN: at 19:37

## 2020-01-01 RX ADMIN — PIPERACILLIN AND TAZOBACTAM 25 GRAM(S): 4; .5 INJECTION, POWDER, LYOPHILIZED, FOR SOLUTION INTRAVENOUS at 05:51

## 2020-01-01 RX ADMIN — CHLORHEXIDINE GLUCONATE 15 MILLILITER(S): 213 SOLUTION TOPICAL at 19:23

## 2020-01-01 RX ADMIN — PIPERACILLIN AND TAZOBACTAM 25 GRAM(S): 4; .5 INJECTION, POWDER, LYOPHILIZED, FOR SOLUTION INTRAVENOUS at 05:34

## 2020-01-01 RX ADMIN — ALBUTEROL 2 PUFF(S): 90 AEROSOL, METERED ORAL at 03:50

## 2020-01-01 RX ADMIN — PIPERACILLIN AND TAZOBACTAM 25 GRAM(S): 4; .5 INJECTION, POWDER, LYOPHILIZED, FOR SOLUTION INTRAVENOUS at 17:34

## 2020-01-01 RX ADMIN — HEPARIN SODIUM 0 UNIT(S)/HR: 5000 INJECTION INTRAVENOUS; SUBCUTANEOUS at 00:01

## 2020-01-01 RX ADMIN — FENTANYL CITRATE 23 MICROGRAM(S)/KG/HR: 50 INJECTION INTRAVENOUS at 01:18

## 2020-01-01 RX ADMIN — ALBUTEROL 2 PUFF(S): 90 AEROSOL, METERED ORAL at 03:48

## 2020-01-01 RX ADMIN — POLYETHYLENE GLYCOL 3350 17 GRAM(S): 17 POWDER, FOR SOLUTION ORAL at 12:43

## 2020-01-01 RX ADMIN — DEXMEDETOMIDINE HYDROCHLORIDE IN 0.9% SODIUM CHLORIDE 9.56 MICROGRAM(S)/KG/HR: 4 INJECTION INTRAVENOUS at 15:05

## 2020-01-01 RX ADMIN — CHLORHEXIDINE GLUCONATE 15 MILLILITER(S): 213 SOLUTION TOPICAL at 05:21

## 2020-01-01 RX ADMIN — ALBUTEROL 2 PUFF(S): 90 AEROSOL, METERED ORAL at 15:30

## 2020-01-01 RX ADMIN — FINASTERIDE 5 MILLIGRAM(S): 5 TABLET, FILM COATED ORAL at 12:42

## 2020-01-01 RX ADMIN — DEXMEDETOMIDINE HYDROCHLORIDE IN 0.9% SODIUM CHLORIDE 9.56 MICROGRAM(S)/KG/HR: 4 INJECTION INTRAVENOUS at 17:32

## 2020-01-01 RX ADMIN — Medication 3.59 MICROGRAM(S)/KG/MIN: at 06:16

## 2020-01-01 RX ADMIN — LACTULOSE 10 GRAM(S): 10 SOLUTION ORAL at 13:07

## 2020-01-01 RX ADMIN — POLYETHYLENE GLYCOL 3350 17 GRAM(S): 17 POWDER, FOR SOLUTION ORAL at 23:14

## 2020-01-01 RX ADMIN — ALBUTEROL 2 PUFF(S): 90 AEROSOL, METERED ORAL at 15:55

## 2020-01-01 RX ADMIN — FENTANYL CITRATE 23 MICROGRAM(S)/KG/HR: 50 INJECTION INTRAVENOUS at 19:23

## 2020-01-01 RX ADMIN — Medication 50 MILLILITER(S): at 18:27

## 2020-01-01 RX ADMIN — CHLORHEXIDINE GLUCONATE 1 APPLICATION(S): 213 SOLUTION TOPICAL at 05:02

## 2020-01-01 RX ADMIN — Medication 650 MILLIGRAM(S): at 09:45

## 2020-01-01 RX ADMIN — PANTOPRAZOLE SODIUM 40 MILLIGRAM(S): 20 TABLET, DELAYED RELEASE ORAL at 14:11

## 2020-01-01 RX ADMIN — Medication 50 MILLILITER(S): at 23:58

## 2020-01-01 RX ADMIN — HEPARIN SODIUM 5000 UNIT(S): 5000 INJECTION INTRAVENOUS; SUBCUTANEOUS at 21:01

## 2020-01-01 RX ADMIN — CHLORHEXIDINE GLUCONATE 15 MILLILITER(S): 213 SOLUTION TOPICAL at 05:18

## 2020-01-01 RX ADMIN — Medication 80 MILLIGRAM(S): at 19:48

## 2020-01-01 RX ADMIN — PANTOPRAZOLE SODIUM 40 MILLIGRAM(S): 20 TABLET, DELAYED RELEASE ORAL at 12:35

## 2020-01-01 RX ADMIN — CHLORHEXIDINE GLUCONATE 15 MILLILITER(S): 213 SOLUTION TOPICAL at 05:26

## 2020-01-01 RX ADMIN — FENTANYL CITRATE 3.83 MICROGRAM(S)/KG/HR: 50 INJECTION INTRAVENOUS at 23:49

## 2020-01-01 RX ADMIN — CHLORHEXIDINE GLUCONATE 15 MILLILITER(S): 213 SOLUTION TOPICAL at 05:34

## 2020-01-01 RX ADMIN — HEPARIN SODIUM 0 UNIT(S)/HR: 5000 INJECTION INTRAVENOUS; SUBCUTANEOUS at 09:00

## 2020-01-01 RX ADMIN — HEPARIN SODIUM 5000 UNIT(S): 5000 INJECTION INTRAVENOUS; SUBCUTANEOUS at 18:27

## 2020-01-01 RX ADMIN — SENNA PLUS 2 TABLET(S): 8.6 TABLET ORAL at 21:03

## 2020-01-01 RX ADMIN — ALBUTEROL 2 PUFF(S): 90 AEROSOL, METERED ORAL at 16:19

## 2020-01-01 RX ADMIN — PIPERACILLIN AND TAZOBACTAM 25 GRAM(S): 4; .5 INJECTION, POWDER, LYOPHILIZED, FOR SOLUTION INTRAVENOUS at 18:26

## 2020-01-01 RX ADMIN — FENTANYL CITRATE 3.83 MICROGRAM(S)/KG/HR: 50 INJECTION INTRAVENOUS at 08:00

## 2020-01-01 RX ADMIN — SENNA PLUS 2 TABLET(S): 8.6 TABLET ORAL at 23:25

## 2020-01-01 RX ADMIN — ALBUTEROL 2 PUFF(S): 90 AEROSOL, METERED ORAL at 20:39

## 2020-01-01 RX ADMIN — CHLORHEXIDINE GLUCONATE 15 MILLILITER(S): 213 SOLUTION TOPICAL at 17:02

## 2020-01-01 RX ADMIN — PANTOPRAZOLE SODIUM 40 MILLIGRAM(S): 20 TABLET, DELAYED RELEASE ORAL at 12:09

## 2020-01-01 RX ADMIN — CHLORHEXIDINE GLUCONATE 15 MILLILITER(S): 213 SOLUTION TOPICAL at 17:51

## 2020-01-01 RX ADMIN — FENTANYL CITRATE 50 MICROGRAM(S): 50 INJECTION INTRAVENOUS at 11:30

## 2020-01-01 RX ADMIN — VASOPRESSIN 2.4 UNIT(S)/MIN: 20 INJECTION INTRAVENOUS at 16:47

## 2020-01-01 RX ADMIN — Medication 3.59 MICROGRAM(S)/KG/MIN: at 21:24

## 2020-01-01 RX ADMIN — PIPERACILLIN AND TAZOBACTAM 25 GRAM(S): 4; .5 INJECTION, POWDER, LYOPHILIZED, FOR SOLUTION INTRAVENOUS at 05:05

## 2020-01-01 RX ADMIN — Medication 250 MILLIGRAM(S): at 12:34

## 2020-01-01 RX ADMIN — FENTANYL CITRATE 3.83 MICROGRAM(S)/KG/HR: 50 INJECTION INTRAVENOUS at 16:49

## 2020-01-01 RX ADMIN — Medication 650 MILLIGRAM(S): at 05:27

## 2020-01-01 RX ADMIN — ALBUTEROL 2 PUFF(S): 90 AEROSOL, METERED ORAL at 09:31

## 2020-01-01 RX ADMIN — POLYETHYLENE GLYCOL 3350 17 GRAM(S): 17 POWDER, FOR SOLUTION ORAL at 16:31

## 2020-01-01 RX ADMIN — PIPERACILLIN AND TAZOBACTAM 25 GRAM(S): 4; .5 INJECTION, POWDER, LYOPHILIZED, FOR SOLUTION INTRAVENOUS at 19:18

## 2020-01-01 RX ADMIN — ALBUTEROL 2 PUFF(S): 90 AEROSOL, METERED ORAL at 08:44

## 2020-01-01 RX ADMIN — ALBUTEROL 2 PUFF(S): 90 AEROSOL, METERED ORAL at 20:07

## 2020-01-01 RX ADMIN — RANOLAZINE 500 MILLIGRAM(S): 500 TABLET, FILM COATED, EXTENDED RELEASE ORAL at 18:27

## 2020-01-01 RX ADMIN — Medication 80 MILLIGRAM(S): at 05:52

## 2020-01-01 RX ADMIN — Medication 2 MILLIGRAM(S): at 12:36

## 2020-01-01 RX ADMIN — Medication 61 MICROGRAM(S)/KG/MIN: at 05:28

## 2020-01-01 RX ADMIN — ALBUTEROL 2 PUFF(S): 90 AEROSOL, METERED ORAL at 08:09

## 2020-01-01 RX ADMIN — Medication 80 MILLIGRAM(S): at 14:41

## 2020-01-01 RX ADMIN — ALBUTEROL 2 PUFF(S): 90 AEROSOL, METERED ORAL at 20:13

## 2020-01-01 RX ADMIN — FENTANYL CITRATE 23 MICROGRAM(S)/KG/HR: 50 INJECTION INTRAVENOUS at 10:31

## 2020-01-01 RX ADMIN — PIPERACILLIN AND TAZOBACTAM 25 GRAM(S): 4; .5 INJECTION, POWDER, LYOPHILIZED, FOR SOLUTION INTRAVENOUS at 05:06

## 2020-01-01 RX ADMIN — Medication 6 CAPSULE(S): at 08:57

## 2020-01-01 RX ADMIN — Medication 101 MILLIGRAM(S): at 18:00

## 2020-01-01 RX ADMIN — CHLORHEXIDINE GLUCONATE 1 APPLICATION(S): 213 SOLUTION TOPICAL at 05:07

## 2020-01-01 RX ADMIN — DEXMEDETOMIDINE HYDROCHLORIDE IN 0.9% SODIUM CHLORIDE 9.56 MICROGRAM(S)/KG/HR: 4 INJECTION INTRAVENOUS at 16:03

## 2020-01-01 RX ADMIN — Medication 50 MILLILITER(S): at 05:02

## 2020-01-01 RX ADMIN — Medication 61 MICROGRAM(S)/KG/MIN: at 23:49

## 2020-01-01 RX ADMIN — Medication 3.59 MICROGRAM(S)/KG/MIN: at 13:06

## 2020-01-01 RX ADMIN — BUMETANIDE 5 MG/HR: 0.25 INJECTION INTRAMUSCULAR; INTRAVENOUS at 16:00

## 2020-01-01 RX ADMIN — HEPARIN SODIUM 800 UNIT(S)/HR: 5000 INJECTION INTRAVENOUS; SUBCUTANEOUS at 10:00

## 2020-01-01 RX ADMIN — CHLORHEXIDINE GLUCONATE 1 APPLICATION(S): 213 SOLUTION TOPICAL at 05:21

## 2020-01-01 RX ADMIN — SENNA PLUS 2 TABLET(S): 8.6 TABLET ORAL at 21:20

## 2020-01-01 RX ADMIN — CHLORHEXIDINE GLUCONATE 1 APPLICATION(S): 213 SOLUTION TOPICAL at 05:51

## 2020-01-01 RX ADMIN — PIPERACILLIN AND TAZOBACTAM 200 GRAM(S): 4; .5 INJECTION, POWDER, LYOPHILIZED, FOR SOLUTION INTRAVENOUS at 11:06

## 2020-01-01 RX ADMIN — RANOLAZINE 500 MILLIGRAM(S): 500 TABLET, FILM COATED, EXTENDED RELEASE ORAL at 17:03

## 2020-01-01 RX ADMIN — CHLORHEXIDINE GLUCONATE 15 MILLILITER(S): 213 SOLUTION TOPICAL at 05:07

## 2020-01-01 RX ADMIN — HEPARIN SODIUM 5000 UNIT(S): 5000 INJECTION INTRAVENOUS; SUBCUTANEOUS at 05:52

## 2020-01-01 RX ADMIN — FINASTERIDE 5 MILLIGRAM(S): 5 TABLET, FILM COATED ORAL at 11:49

## 2020-01-01 RX ADMIN — CHLORHEXIDINE GLUCONATE 1 APPLICATION(S): 213 SOLUTION TOPICAL at 05:26

## 2020-01-01 RX ADMIN — TAMSULOSIN HYDROCHLORIDE 0.4 MILLIGRAM(S): 0.4 CAPSULE ORAL at 00:32

## 2020-01-01 RX ADMIN — BUMETANIDE 5 MG/HR: 0.25 INJECTION INTRAMUSCULAR; INTRAVENOUS at 19:37

## 2020-01-01 RX ADMIN — DEXMEDETOMIDINE HYDROCHLORIDE IN 0.9% SODIUM CHLORIDE 9.56 MICROGRAM(S)/KG/HR: 4 INJECTION INTRAVENOUS at 05:14

## 2020-01-01 RX ADMIN — HEPARIN SODIUM 5000 UNIT(S): 5000 INJECTION INTRAVENOUS; SUBCUTANEOUS at 05:27

## 2020-01-01 RX ADMIN — FENTANYL CITRATE 25 MICROGRAM(S): 50 INJECTION INTRAVENOUS at 08:00

## 2020-01-01 RX ADMIN — FINASTERIDE 5 MILLIGRAM(S): 5 TABLET, FILM COATED ORAL at 12:09

## 2020-01-01 RX ADMIN — FENTANYL CITRATE 50 MICROGRAM(S): 50 INJECTION INTRAVENOUS at 10:00

## 2020-01-01 RX ADMIN — PIPERACILLIN AND TAZOBACTAM 25 GRAM(S): 4; .5 INJECTION, POWDER, LYOPHILIZED, FOR SOLUTION INTRAVENOUS at 05:27

## 2020-01-01 RX ADMIN — Medication 50 MILLILITER(S): at 16:26

## 2020-01-01 RX ADMIN — FENTANYL CITRATE 50 MICROGRAM(S): 50 INJECTION INTRAVENOUS at 11:20

## 2020-01-01 RX ADMIN — FENTANYL CITRATE 11.5 MICROGRAM(S)/KG/HR: 50 INJECTION INTRAVENOUS at 20:17

## 2020-01-01 RX ADMIN — FINASTERIDE 5 MILLIGRAM(S): 5 TABLET, FILM COATED ORAL at 11:27

## 2020-01-01 RX ADMIN — Medication 650 MILLIGRAM(S): at 05:09

## 2020-01-01 RX ADMIN — ALBUTEROL 2 PUFF(S): 90 AEROSOL, METERED ORAL at 15:07

## 2020-01-01 RX ADMIN — Medication 650 MILLIGRAM(S): at 13:09

## 2020-01-01 RX ADMIN — DEXMEDETOMIDINE HYDROCHLORIDE IN 0.9% SODIUM CHLORIDE 3.83 MICROGRAM(S)/KG/HR: 4 INJECTION INTRAVENOUS at 14:55

## 2020-01-01 RX ADMIN — Medication 3.59 MICROGRAM(S)/KG/MIN: at 00:25

## 2020-01-01 RX ADMIN — PIPERACILLIN AND TAZOBACTAM 25 GRAM(S): 4; .5 INJECTION, POWDER, LYOPHILIZED, FOR SOLUTION INTRAVENOUS at 18:27

## 2020-01-01 RX ADMIN — DEXMEDETOMIDINE HYDROCHLORIDE IN 0.9% SODIUM CHLORIDE 9.56 MICROGRAM(S)/KG/HR: 4 INJECTION INTRAVENOUS at 22:30

## 2020-01-01 RX ADMIN — PIPERACILLIN AND TAZOBACTAM 25 GRAM(S): 4; .5 INJECTION, POWDER, LYOPHILIZED, FOR SOLUTION INTRAVENOUS at 05:09

## 2020-01-01 RX ADMIN — ETOMIDATE 10 MILLIGRAM(S): 2 INJECTION INTRAVENOUS at 10:00

## 2020-01-01 RX ADMIN — CHLORHEXIDINE GLUCONATE 1 APPLICATION(S): 213 SOLUTION TOPICAL at 05:18

## 2020-01-01 RX ADMIN — ALBUTEROL 2 PUFF(S): 90 AEROSOL, METERED ORAL at 08:03

## 2020-01-01 RX ADMIN — Medication 650 MILLIGRAM(S): at 05:07

## 2020-01-01 RX ADMIN — DEXMEDETOMIDINE HYDROCHLORIDE IN 0.9% SODIUM CHLORIDE 19.1 MICROGRAM(S)/KG/HR: 4 INJECTION INTRAVENOUS at 08:09

## 2020-01-01 RX ADMIN — RANOLAZINE 500 MILLIGRAM(S): 500 TABLET, FILM COATED, EXTENDED RELEASE ORAL at 17:51

## 2020-01-01 RX ADMIN — PANTOPRAZOLE SODIUM 40 MILLIGRAM(S): 20 TABLET, DELAYED RELEASE ORAL at 11:49

## 2020-01-01 RX ADMIN — Medication 10 MILLIGRAM(S): at 16:31

## 2020-01-01 RX ADMIN — CHLORHEXIDINE GLUCONATE 15 MILLILITER(S): 213 SOLUTION TOPICAL at 05:04

## 2020-01-01 RX ADMIN — ALBUTEROL 2 PUFF(S): 90 AEROSOL, METERED ORAL at 09:25

## 2020-01-01 RX ADMIN — POLYETHYLENE GLYCOL 3350 17 GRAM(S): 17 POWDER, FOR SOLUTION ORAL at 11:52

## 2020-01-01 RX ADMIN — Medication 50 MILLILITER(S): at 17:36

## 2020-01-01 RX ADMIN — Medication 650 MILLIGRAM(S): at 13:08

## 2020-01-01 RX ADMIN — PIPERACILLIN AND TAZOBACTAM 25 GRAM(S): 4; .5 INJECTION, POWDER, LYOPHILIZED, FOR SOLUTION INTRAVENOUS at 16:00

## 2020-01-01 RX ADMIN — PHENYLEPHRINE HYDROCHLORIDE 4.3 MICROGRAM(S)/KG/MIN: 10 INJECTION INTRAVENOUS at 10:25

## 2020-01-01 RX ADMIN — Medication 132 MILLIGRAM(S): at 11:50

## 2020-01-01 RX ADMIN — RANOLAZINE 500 MILLIGRAM(S): 500 TABLET, FILM COATED, EXTENDED RELEASE ORAL at 17:34

## 2020-01-01 RX ADMIN — ALBUTEROL 2 PUFF(S): 90 AEROSOL, METERED ORAL at 20:24

## 2020-01-01 RX ADMIN — CHLORHEXIDINE GLUCONATE 15 MILLILITER(S): 213 SOLUTION TOPICAL at 19:18

## 2020-01-01 RX ADMIN — ALBUTEROL 2 PUFF(S): 90 AEROSOL, METERED ORAL at 04:00

## 2020-01-01 RX ADMIN — CHLORHEXIDINE GLUCONATE 15 MILLILITER(S): 213 SOLUTION TOPICAL at 18:58

## 2020-01-01 RX ADMIN — CHLORHEXIDINE GLUCONATE 1 APPLICATION(S): 213 SOLUTION TOPICAL at 05:14

## 2020-01-01 RX ADMIN — CHLORHEXIDINE GLUCONATE 15 MILLILITER(S): 213 SOLUTION TOPICAL at 05:09

## 2020-01-01 RX ADMIN — ALBUTEROL 2 PUFF(S): 90 AEROSOL, METERED ORAL at 09:58

## 2020-01-01 RX ADMIN — ALBUTEROL 2 PUFF(S): 90 AEROSOL, METERED ORAL at 03:37

## 2020-01-01 RX ADMIN — Medication 50 MILLILITER(S): at 13:31

## 2020-01-01 RX ADMIN — Medication 61 MICROGRAM(S)/KG/MIN: at 20:58

## 2020-01-01 RX ADMIN — Medication 100 MILLIEQUIVALENT(S): at 13:00

## 2020-01-01 RX ADMIN — FENTANYL CITRATE 15.3 MICROGRAM(S)/KG/HR: 50 INJECTION INTRAVENOUS at 23:00

## 2020-01-01 RX ADMIN — ALBUTEROL 2 PUFF(S): 90 AEROSOL, METERED ORAL at 20:14

## 2020-01-01 RX ADMIN — DEXMEDETOMIDINE HYDROCHLORIDE IN 0.9% SODIUM CHLORIDE 9.56 MICROGRAM(S)/KG/HR: 4 INJECTION INTRAVENOUS at 13:39

## 2020-01-01 RX ADMIN — ALBUTEROL 2 PUFF(S): 90 AEROSOL, METERED ORAL at 08:33

## 2020-01-01 RX ADMIN — FENTANYL CITRATE 50 MICROGRAM(S): 50 INJECTION INTRAVENOUS at 10:31

## 2020-01-01 RX ADMIN — Medication 650 MILLIGRAM(S): at 15:44

## 2020-01-01 RX ADMIN — FENTANYL CITRATE 23 MICROGRAM(S)/KG/HR: 50 INJECTION INTRAVENOUS at 12:49

## 2020-01-01 RX ADMIN — PIPERACILLIN AND TAZOBACTAM 25 GRAM(S): 4; .5 INJECTION, POWDER, LYOPHILIZED, FOR SOLUTION INTRAVENOUS at 17:50

## 2020-01-01 RX ADMIN — HEPARIN SODIUM 1400 UNIT(S)/HR: 5000 INJECTION INTRAVENOUS; SUBCUTANEOUS at 17:22

## 2020-01-01 RX ADMIN — Medication 7.17 MICROGRAM(S)/KG/MIN: at 08:30

## 2020-01-01 RX ADMIN — ROBINUL 0.4 MILLIGRAM(S): 0.2 INJECTION INTRAMUSCULAR; INTRAVENOUS at 12:54

## 2020-01-01 RX ADMIN — ALBUTEROL 2 PUFF(S): 90 AEROSOL, METERED ORAL at 15:22

## 2020-01-01 RX ADMIN — DEXMEDETOMIDINE HYDROCHLORIDE IN 0.9% SODIUM CHLORIDE 3.83 MICROGRAM(S)/KG/HR: 4 INJECTION INTRAVENOUS at 04:18

## 2020-01-01 RX ADMIN — Medication 650 MILLIGRAM(S): at 05:05

## 2020-01-01 RX ADMIN — DEXMEDETOMIDINE HYDROCHLORIDE IN 0.9% SODIUM CHLORIDE 3.83 MICROGRAM(S)/KG/HR: 4 INJECTION INTRAVENOUS at 02:23

## 2020-01-01 RX ADMIN — Medication 100 MILLIGRAM(S): at 15:16

## 2020-01-01 RX ADMIN — CHLORHEXIDINE GLUCONATE 1 APPLICATION(S): 213 SOLUTION TOPICAL at 05:34

## 2020-01-01 RX ADMIN — PANTOPRAZOLE SODIUM 40 MILLIGRAM(S): 20 TABLET, DELAYED RELEASE ORAL at 11:08

## 2020-01-01 RX ADMIN — VASOPRESSIN 2.4 UNIT(S)/MIN: 20 INJECTION INTRAVENOUS at 12:34

## 2020-01-01 RX ADMIN — Medication 650 MILLIGRAM(S): at 23:33

## 2020-01-01 RX ADMIN — Medication 50 MILLILITER(S): at 05:32

## 2020-01-01 RX ADMIN — PIPERACILLIN AND TAZOBACTAM 25 GRAM(S): 4; .5 INJECTION, POWDER, LYOPHILIZED, FOR SOLUTION INTRAVENOUS at 05:18

## 2020-01-01 RX ADMIN — ALBUTEROL 2 PUFF(S): 90 AEROSOL, METERED ORAL at 20:19

## 2020-01-01 RX ADMIN — SODIUM CHLORIDE 1000 MILLILITER(S): 9 INJECTION INTRAMUSCULAR; INTRAVENOUS; SUBCUTANEOUS at 10:13

## 2020-01-01 RX ADMIN — CHLORHEXIDINE GLUCONATE 15 MILLILITER(S): 213 SOLUTION TOPICAL at 18:11

## 2020-01-01 RX ADMIN — ALBUTEROL 2 PUFF(S): 90 AEROSOL, METERED ORAL at 03:42

## 2020-01-01 RX ADMIN — PANTOPRAZOLE SODIUM 40 MILLIGRAM(S): 20 TABLET, DELAYED RELEASE ORAL at 11:51

## 2020-01-01 RX ADMIN — HEPARIN SODIUM 1100 UNIT(S)/HR: 5000 INJECTION INTRAVENOUS; SUBCUTANEOUS at 02:11

## 2020-01-01 RX ADMIN — CHLORHEXIDINE GLUCONATE 15 MILLILITER(S): 213 SOLUTION TOPICAL at 17:35

## 2020-01-01 RX ADMIN — ALBUTEROL 2 PUFF(S): 90 AEROSOL, METERED ORAL at 22:25

## 2020-01-01 RX ADMIN — Medication 650 MILLIGRAM(S): at 21:03

## 2020-01-01 RX ADMIN — Medication 650 MILLIGRAM(S): at 21:01

## 2020-01-01 RX ADMIN — VASOPRESSIN 2.4 UNIT(S)/MIN: 20 INJECTION INTRAVENOUS at 03:45

## 2020-01-01 RX ADMIN — ALBUTEROL 2 PUFF(S): 90 AEROSOL, METERED ORAL at 02:33

## 2020-01-01 RX ADMIN — PANTOPRAZOLE SODIUM 40 MILLIGRAM(S): 20 TABLET, DELAYED RELEASE ORAL at 11:28

## 2020-11-12 NOTE — ED ADULT NURSE NOTE - CHIEF COMPLAINT
Normal rate, regular rhythm.  Heart sounds S1, S2.  No murmurs, rubs or gallops. The patient is a 85y Male complaining of altered mental status.

## 2020-11-12 NOTE — ED PROVIDER NOTE - SECONDARY DIAGNOSIS.
Respiratory failure with hypoxia, unspecified chronicity Acute renal failure, unspecified acute renal failure type

## 2020-11-12 NOTE — ED PROVIDER NOTE - CLINICAL SUMMARY MEDICAL DECISION MAKING FREE TEXT BOX
86 yo male presents with possibly Sepsis from PNA vs UTI vs Covid infection. Will obtain septic workup, cardiac work up, and covid test.

## 2020-11-12 NOTE — CONSULT NOTE ADULT - SUBJECTIVE AND OBJECTIVE BOX
Dr. Vaughan  Office [phone]  Cell [phone]  Pallavi GRAJEDA  Cell [phone]    RENAL INITIAL CONSULT NOTE: DATE OF SERVICE 20 @ 19:30    HPI:  Patient is a 85y old  Male who presents with a chief complaint of     HPI:  Pt is a 84 yo male with PMHx of CAD with stent, Afib on Eliquis, HTN, HLD, Gout, who presented to ED for generalized weakness for the past 2 weeks, generalized deteriorating progressively, and worsening cough. Son also reports that the patient's mental status has been slowly deteriorating. Per son, patient was discharged from [facility] 2.5 weeks ago where he was diagnosed with acute renal failure with a creatinine of 5 and PNA. His renal function was improving when he was discharged from hospital.  Ever since discharge, the patient has been able to barely move with persistent productive cough with yellow/green sputum. Since yesterday, patient has been unable to get out of bed. Denies any nausea, vomiting, abdominal pain, dysuria, hematuria, or any other complaints. Pt currently on bipap and unable to provide any history, information obtained from ER attending, patient's son and patient chart. As per son he follow with a Nephrologist in [place], who told them that he has lot of protein in the urine but decided not to do the kidney biopsy. He was advised to drink 2L/day water. on admission his Scr is still around 5 but he is fluid overloaded.      Allergies:  No Known Allergies      PAST MEDICAL & SURGICAL HISTORY:  HLD (hyperlipidemia)    HTN (hypertension)    Afib    Gout    CAD (coronary artery disease)    No significant past surgical history        Home Medications Reviewed    Hospital Medications:   MEDICATIONS  (STANDING):  ALBUTerol    90 MICROgram(s) HFA Inhaler 2 Puff(s) Inhalation every 6 hours  finasteride 5 milliGRAM(s) Oral daily  furosemide   Injectable 80 milliGRAM(s) IV Push every 12 hours  heparin   Injectable 5000 Unit(s) IV Push every 12 hours  pancrelipase  (CREON  6,000 Lipase Units) 6 Capsule(s) Oral three times a day with meals  pantoprazole  Injectable 40 milliGRAM(s) IV Push daily  piperacillin/tazobactam IVPB.. 3.375 Gram(s) IV Intermittent every 12 hours  ranolazine 500 milliGRAM(s) Oral two times a day  tamsulosin 0.4 milliGRAM(s) Oral at bedtime      SOCIAL HISTORY:  Denies ETOh, Smoking,     FAMILY HISTORY:      REVIEW OF SYSTEMS:  as above in HPI  ROS as obtained from son, patient is on BiPap not able to provide ROS    VITALS:  T(F): 97.8 (20 @ 19:35), Max: 98.8 (20 @ 09:30)  HR: 85 (20 @ 20:00)  BP: 122/62 (20 @ 19:35)  RR: 21 (20 @ 19:35)  SpO2: 92% (20 @ 20:00)  Wt(kg): --    Height (cm): 177.8 ( 08:58)  Weight (kg): 76.5 ( 08:58)  BMI (kg/m2): 24.2 ( 08:58)  BSA (m2): 1.94 ( 08:58)    PHYSICAL EXAM:  Constitutional: in Mild Resp distress, using BiPaP  HEENT: anicteric sclera, oropharynx clear, MMM  Neck: No JVD  Respiratory: Bilateral basal crackles+  Cardiovascular: S1, S2, RRR  Gastrointestinal: BS+, soft, NT/ND  Extremities: No cyanosis or clubbing. 3-4+ peripheral edema  Neurological: not able to evaluate  : No CVA tenderness. + robbins.   Skin: No rashes       LABS:      128<L>  |  101  |  51<H>  ----------------------------<  104<H>  4.1   |  16<L>  |  5.37<H>    Ca    8.5      2020 17:23    TPro  5.7<L>  /  Alb  1.5<L>  /  TBili  0.6  /  DBili      /  AST  31  /  ALT  21  /  AlkPhos  67      Creatinine Trend: 5.37 <--, 5.60 <--                        7.4    16.10 )-----------( 263      ( 2020 17:23 )             22.4     Urine Studies:  Urinalysis Basic - ( 2020 18:04 )    Color: Yellow / Appearance: Clear / S.025 / pH:   Gluc:  / Ketone: Negative  / Bili: Negative / Urobili: Negative   Blood:  / Protein: 100 / Nitrite: Negative   Leuk Esterase: Trace / RBC: 5-10 /HPF / WBC 3-5 /HPF   Sq Epi:  / Non Sq Epi: Few /HPF / Bacteria: Few /HPF          RADIOLOGY & ADDITIONAL STUDIES:

## 2020-11-12 NOTE — ED PROVIDER NOTE - RESPIRATORY, MLM
Crackles and rhonchi at both bases. 02 87% on non-rebreather but on exam O2 sat at 96% with good wave form. Tachypneic to 20-25.

## 2020-11-12 NOTE — ED PROVIDER NOTE - CARE PLAN
Principal Discharge DX:	Pneumonia of both lungs due to infectious organism, unspecified part of lung  Secondary Diagnosis:	Respiratory failure with hypoxia, unspecified chronicity  Secondary Diagnosis:	Acute renal failure, unspecified acute renal failure type

## 2020-11-12 NOTE — ED ADULT NURSE NOTE - NSIMPLEMENTINTERV_GEN_ALL_ED
Implemented All Fall Risk Interventions:  [place] to call system. Call bell, personal items and telephone within reach. Instruct patient to call for assistance. Room bathroom lighting operational. Non-slip footwear when patient is off stretcher. Physically safe environment: no spills, clutter or unnecessary equipment. Stretcher in lowest position, wheels locked, appropriate side rails in place. Provide visual cue, wrist band, yellow gown, etc. Monitor gait and stability. Monitor for mental status changes and reorient to person, place, and time. Review medications for side effects contributing to fall risk. Reinforce activity limits and safety measures with patient and family.

## 2020-11-12 NOTE — ED ADULT NURSE NOTE - OBJECTIVE STATEMENT
pt bibems for AMS. As per son pt has been feeling weak for approx 2 weeks, & progressively worse. Pt was found hypoxic by EMS and was placed on non rebreather @ 15L. pt presents with wheezing and mildly labored breathing. pt presents A/ox2, but with some incoherent speech d/t coughing and breathing impairment. pt was recently admitted at [facility] for kidney failure.

## 2020-11-12 NOTE — ED PROVIDER NOTE - OBJECTIVE STATEMENT
84 yo male with PMHx of CAD with stent, Afib on Eliquis, HTN, HLD, Gout, presents brought in by son for generalized weakness for the past 2 weeks, generalized deteriorating progressively, and worsening cough. Son also reports that the patient's mental status has been slowly deteriorating. Per son, patient was discharged from [facility] 2.5 weeks ago where he was diagnosed with acute renal failure with a creatinine of 5 and PNA. Ever since discharge, the patient has been able to barely move with persistent productive cough with yellow/green sputum. Since yesterday, patient has been unable to get out of bed. Denies any nausea, vomiting, abdominal pain, dysuria, hematuria, or any other complaints.

## 2020-11-12 NOTE — H&P ADULT - HISTORY OF PRESENT ILLNESS
Patient is a 85y old  Male who presents with a chief complaint of     Initial HPI on admission:  HPI:  Pt is a 86 yo male with PMHx of CAD with stent, Afib on Eliquis, HTN, HLD, Gout, who presented to ED for generalized weakness for the past 2 weeks, generalized deteriorating progressively, and worsening cough. Son also reports that the patient's mental status has been slowly deteriorating. Per son, patient was discharged from [facility] 2.5 weeks ago where he was diagnosed with acute renal failure with a creatinine of 5 and PNA. Ever since discharge, the patient has been able to barely move with persistent productive cough with yellow/green sputum. Since yesterday, patient has been unable to get out of bed. Denies any nausea, vomiting, abdominal pain, dysuria, hematuria, or any other complaints. Pt currently on bipap and unable to provide any history, information obtained from ER attending and patient chart.   BRIEF HOSPITAL COURSE: ***    PAST MEDICAL & SURGICAL HISTORY:  HLD (hyperlipidemia)    HTN (hypertension)    Afib    Gout    CAD (coronary artery disease)    No significant past surgical history      Allergies    No Known Allergies    Intolerances      FAMILY HISTORY:          Medications:      vent settings      Vital Signs Last 24 Hrs  T(C): 36.4 (12 Nov 2020 16:56), Max: 37.1 (12 Nov 2020 09:30)  T(F): 97.6 (12 Nov 2020 16:56), Max: 98.8 (12 Nov 2020 09:30)  HR: 85 (12 Nov 2020 16:56) (85 - 104)  BP: 125/66 (12 Nov 2020 08:58) (125/66 - 125/66)  BP(mean): --  RR: 22 (12 Nov 2020 08:58) (22 - 22)  SpO2: 94% (12 Nov 2020 12:53) (87% - 94%)    ABG - ( 12 Nov 2020 13:07 )  pH, Arterial: 7.24  pH, Blood: x     /  pCO2: 40    /  pO2: 62    / HCO3: 16    / Base Excess: -9.7  /  SaO2: 86                        LABS:                        7.8    14.44 )-----------( 290      ( 12 Nov 2020 10:00 )             24.0     11-12    128<L>  |  98  |  51<H>  ----------------------------<  97  4.2   |  16<L>  |  5.60<H>    Ca    8.7      12 Nov 2020 10:00    TPro  6.1  /  Alb  1.7<L>  /  TBili  0.5  /  DBili  x   /  AST  39  /  ALT  21  /  AlkPhos  71  11-12      CARDIAC MARKERS ( 12 Nov 2020 10:00 )  0.019 ng/mL / x     / x     / x     / x          CAPILLARY BLOOD GLUCOSE      POCT Blood Glucose.: 109 mg/dL (12 Nov 2020 09:40)    PT/INR - ( 12 Nov 2020 10:00 )   PT: 20.3 sec;   INR: 1.75 ratio         PTT - ( 12 Nov 2020 10:00 )  PTT:39.3 sec    CULTURES:        Physical Examination:  PHYSICAL EXAM:  GENERAL: Elderly male lying on bed, appears short of breath  HEAD:  Atraumatic, Normocephalic  EYES: EOMI, PERRLA, conjunctiva and sclera clear  NECK: Supple, No JVD  CHEST/LUNG: b/l diffuse crackles and expiratory wheeze  HEART: Irregular heart rate, tachycardiac  ABDOMEN: Soft, Nontender, Nondistended; Bowel sounds present  EXTREMITIES:  2+ Peripheral Pulses, No significant edema  PSYCH: Confused and lethargic  NEUROLOGY: non-focal  SKIN: No rashes or lesions    RADIOLOGY REVIEWED:                 Patient is a 85y old  Male who presents with a chief complaint of     Initial HPI on admission:  HPI:  Pt is a 86 yo male with PMHx of CAD with stent, Afib on Eliquis, HTN, HLD, Gout, who presented to ED for generalized weakness for the past 2 weeks, generalized deteriorating progressively, and worsening cough. Son also reports that the patient's mental status has been slowly deteriorating. Per son, patient was discharged from [facility] 2.5 weeks ago where he was diagnosed with acute renal failure with a creatinine of 5 and PNA. Ever since discharge, the patient has been able to barely move with persistent productive cough with yellow/green sputum. Since yesterday, patient has been unable to get out of bed. Denies any nausea, vomiting, abdominal pain, dysuria, hematuria, or any other complaints. Pt currently on bipap and unable to provide any history, information obtained from ER attending and patient chart.   BRIEF HOSPITAL COURSE: ***    PAST MEDICAL & SURGICAL HISTORY:  HLD (hyperlipidemia)    HTN (hypertension)    Afib    Gout    CAD (coronary artery disease)    No significant past surgical history      Allergies    No Known Allergies    Intolerances      FAMILY HISTORY:          Medications:      vent settings      Vital Signs Last 24 Hrs  T(C): 36.4 (12 Nov 2020 16:56), Max: 37.1 (12 Nov 2020 09:30)  T(F): 97.6 (12 Nov 2020 16:56), Max: 98.8 (12 Nov 2020 09:30)  HR: 85 (12 Nov 2020 16:56) (85 - 104)  BP: 125/66 (12 Nov 2020 08:58) (125/66 - 125/66)  BP(mean): --  RR: 22 (12 Nov 2020 08:58) (22 - 22)  SpO2: 94% (12 Nov 2020 12:53) (87% - 94%)    ABG - ( 12 Nov 2020 13:07 )  pH, Arterial: 7.24  pH, Blood: x     /  pCO2: 40    /  pO2: 62    / HCO3: 16    / Base Excess: -9.7  /  SaO2: 86                        LABS:                        7.8    14.44 )-----------( 290      ( 12 Nov 2020 10:00 )             24.0     11-12    128<L>  |  98  |  51<H>  ----------------------------<  97  4.2   |  16<L>  |  5.60<H>    Ca    8.7      12 Nov 2020 10:00    TPro  6.1  /  Alb  1.7<L>  /  TBili  0.5  /  DBili  x   /  AST  39  /  ALT  21  /  AlkPhos  71  11-12      CARDIAC MARKERS ( 12 Nov 2020 10:00 )  0.019 ng/mL / x     / x     / x     / x          CAPILLARY BLOOD GLUCOSE      POCT Blood Glucose.: 109 mg/dL (12 Nov 2020 09:40)    PT/INR - ( 12 Nov 2020 10:00 )   PT: 20.3 sec;   INR: 1.75 ratio         PTT - ( 12 Nov 2020 10:00 )  PTT:39.3 sec    CULTURES:        Physical Examination:  PHYSICAL EXAM:  GENERAL: Elderly male lying on bed, appears short of breath  HEAD:  Atraumatic, Normocephalic  EYES: EOMI, PERRLA, conjunctiva and sclera clear  NECK: Supple, No JVD  CHEST/LUNG: b/l diffuse crackles and expiratory wheeze  HEART: Irregular heart rate, tachycardiac  ABDOMEN: Soft, Nontender, Nondistended; Bowel sounds present  EXTREMITIES:  2+ Peripheral Pulses, No significant edema  PSYCH: Confused and lethargic  NEUROLOGY: non-focal  SKIN: No rashes or lesions    RADIOLOGY REVIEWED

## 2020-11-12 NOTE — ED PROVIDER NOTE - MUSCULOSKELETAL, MLM
3+ pedal edema to bilateral lower extremity. Spine appears normal, range of motion is not limited, no muscle or joint tenderness

## 2020-11-12 NOTE — H&P ADULT - ASSESSMENT
ARTIFICIAL TEARS to affected eye(s) as needed. ASSESSMENT AND PLAN:  Pt is 84 yo male with PMHx of CAD with stent, Afib on Eliquis, HTN, HLD, Gout who presented with weakness and shortness of breath. Pt is     Neuro  -Acute Encephalopathy vs worsening dementia  CT head on admission negative for any acute stroke  BUN elevated to 51  Monitor mental status closely    Cardiovascular  -Afib on Eliquis   F/u repeat Hb and continue with eliquis if stable  Pt not on any Bb at home    CAD  Pt on eliquis at home  Pt with recent echo done on 10/22 which showed normal EF, mild-mod MR and mild tricuspid regurgitation  EKG with Afib, no acute ischemic changes noted    Acute Pulmonary edema  Likely secondary to fluid overload from worsening CKD  Pt s/p 2L ivf in ED  Will give lasix 80mg and monitor urine output  Pt unable to pass urine  Urology consulted    Pulmonary  -Acute hypoxic respiratory failure s/s to Pneumonia and fluid overload requiring BIPAP support  Will start on broad spectrum abx, f/u blood cultures, urine cultures, sputum cultures, legionella and strep antigen  Will continue with diuresis     Infections  -Pneumonia  Will continue with broad spectrum abx  F/u blood cultures  Covid negative  F/u RVP and repeat covid in 24 hours    Nephro  -Progressive CKD  Pt with baseline Cr of around 2.7 05/20 with prior h/o post infectious GN  Pt Cr 5 on last admission  Will start on lasix 80mg and monitor urine output  Strict I/O monitoring  Nephro eval      Gastrointestinal  -Npo for now  No acute issues at present    Heme  -Hb 7.8, baseline around 8.7 as per documentation  Monitor H/H  Will hold eliquis for now, WIll restart in am if H&H remains stable  Endocrine  -    Skin/Catheters  -No acute issues    Prophylaxis   -c/w eliquis for dvt ppx  -pantoprazole for GI ppx ASSESSMENT AND PLAN:  Pt is 86 yo male with PMHx of CAD with stent, Afib on Eliquis, HTN, HLD, Gout who presented with weakness and shortness of breath. Pt is being admitted to ICU for hypoxic respiratory failure requiring BIPAP support.    1.Hypoxic Resp failure due to pneumonia and fluid overload  2.CKD  3.Acute encephalopathy  4.Afib on Eliquis  5.HTN  6.BPH  7.Anemia    Neuro  -Acute Encephalopathy vs worsening dementia  CT head on admission negative for any acute stroke  BUN elevated to 51  Monitor mental status closely    Cardiovascular  -Afib on Eliquis   F/u repeat Hb and continue with eliquis if stable  Pt not on any Bb at home    CAD  Pt on eliquis at home  Pt with recent echo done on 10/22 which showed normal EF, mild-mod MR and mild tricuspid regurgitation  EKG with Afib, no acute ischemic changes noted    Acute Pulmonary edema  Likely secondary to fluid overload from worsening CKD  Pt s/p 2L ivf in ED  Will give lasix 80mg and monitor urine output  Pt unable to pass urine  Urology consulted    Pulmonary  -Acute hypoxic respiratory failure s/s to Pneumonia and fluid overload requiring BIPAP support  Will start on broad spectrum abx, f/u blood cultures, urine cultures, sputum cultures, legionella and strep antigen  Will continue with diuresis     Infections  -Pneumonia  Will continue with broad spectrum abx  F/u blood cultures  Covid negative  F/u RVP and repeat covid in 24 hours    Nephro  -Progressive CKD  Pt with baseline Cr of around 2.7 05/20 with prior h/o post infectious GN  Pt Cr 5 on last admission  Will start on lasix 80mg and monitor urine output  Strict I/O monitoring  Nephro eval      Gastrointestinal  -Npo for now  No acute issues at present    Heme  -Hb 7.8, baseline around 8.7 as per documentation  Monitor H/H  Will hold eliquis for now, WIll restart in am if H&H remains stable  Endocrine  -    Skin/Catheters  -No acute issues    Prophylaxis   -c/w eliquis for dvt ppx  -pantoprazole for GI ppx

## 2020-11-12 NOTE — ED PROVIDER NOTE - PROGRESS NOTE DETAILS
Initial bolus of 1L given - in light of present lower bilateral extremity edema and bibasilar crackles, concern for possible fluid overload. Will consider re-bolus after lactate, CXR, and pro-bnp results. Nemes - reevaluated, o2 sat-89% on NRB. Will place on BiPAP, get ABG and get ICU consult Ari - ICU in the ER, accepted under Dr Martines

## 2020-11-12 NOTE — H&P ADULT - ATTENDING COMMENTS
IMP: This is an 84 yo man  CAD with stent, Afib on Eliquis, HTN, HLD, Gout who presented with weakness and shortness of breath. Pt is being admitted to ICU for hypoxic respiratory failure requiring BIPAP support.    1.Hypoxic Resp failure due to pneumonia and fluid overload  2.CKD  3.Acute encephalopathy  4.Afib on Eliquis  5.HTN  6.BPH  7.Anemia        Plan  -admit to ICU  -iv antibx  -f/u cultures  -BiPaP  -isolation : contact and air born for possible covid-19 infection   -GI DVT prophy  -hemodynamic support  -asp precaution

## 2020-11-12 NOTE — ED ADULT TRIAGE NOTE - CHIEF COMPLAINT QUOTE
increased weakness for one week , as per ems report unable to get him oob this am ,on arrival ems reports getting a sat on 76 when they arrived , on arrival to the er sat is 87  on 100 nrb, left f/a 20 in place in by ems

## 2020-11-12 NOTE — PATIENT PROFILE ADULT - VISION (WITH CORRECTIVE LENSES IF THE PATIENT USUALLY WEARS THEM):
As per son, blurry. has glaucoma"/Partially impaired: cannot see medication labels or newsprint, but can see obstacles in path, and the surrounding layout; can count fingers at arm's length

## 2020-11-12 NOTE — PROCEDURE NOTE - NSURITECHNIQUE_GU_A_CORE
The site was cleaned with soap/water and sterile solution (betadine)/The catheter was secured with a securement device (e.g. StatLock)/The urinary drainage system is closed at the end of the procedure/All applicable medical record documentation is completed/Proper hand hygiene was performed/The catheter was appropriately lubricated/The collection bag is below the level of the patient and urinary bladder/Sterile gloves were worn for the duration of the procedure/A sterile drape was used to cover all adjacent areas

## 2020-11-12 NOTE — ED ADULT NURSE NOTE - ED STAT RN HANDOFF DETAILS
Report given to JAYME Merino in ICU, pt is on monitor and BIPAP rate 12/6, 14RR, FIO2 60%, O2 sat 90-91% tolerating well, pt needs addressed, IV patent, skin integrity maintained.

## 2020-11-12 NOTE — ED PROVIDER NOTE - ENMT, MLM
Airway patent, Dry oral mucosa. Throat has no vesicles, no oropharyngeal exudates and uvula is midline.

## 2020-11-12 NOTE — CONSULT NOTE ADULT - ASSESSMENT
Pt is a 86 yo male with PMHx of CAD with stent, Afib on Eliquis, HTN, HLD, Gout, who presented to ED for generalized weakness for the past 2 weeks, generalized deteriorating progressively, and worsening cough. Son also reports that the patient's mental status has been slowly deteriorating. Admitted with fluid overload. Pt is a 86 yo male with PMHx of CAD with stent, Afib on Eliquis, HTN, HLD, Gout, who presented to ED for generalized weakness for the past 2 weeks, generalized deteriorating progressively, and worsening cough. Son also reports that the patient's mental status has been slowly deteriorating. Admitted with fluid overload.    A/P:  Renal failure:  Unclear baseline  Acute Vs CKD  Etiology?  Has HTN, Proteinuria-Glomerulonephritis can not be ruled out  Will get more info from his outpatient Nephrologist  Monitor renal function at present    Fluid Overload:  SOB possible sec to fluid overload  Follow up CT chest to rule out other etiology  Given lasix 80mg IV, having good urine output, still needs BiPap  Consider to start lasix drip 10mg/hr  Monitor I/O  Daily weight  If needed can get HD. D/W his son got consent for HD if needed    Proteinuria:  Etiology?  Will get vasculitis work up  JAI, C3, C4, ANCA, anti-GBM, Hep C, HBsAg, SPEP, Serum immunofixation, serum free light chain, RPR, HIV, urine protein/cr ratio    Hyponatremia:  Likely sec to fluid overload  Rule out SIADH  Get Urine Na, Urine osmolality, TSH, serum cortisol    Acidosis:  Non-AG  Supplement NaHCO3 650mg TID     Anemia:  Etiology?  Get iron study, B12, folate  Transfuse PRN to keep Hb >8.0  Monitor Hb

## 2020-11-13 NOTE — PROCEDURE NOTE - NSPROCDETAILS_GEN_ALL_CORE
sterile technique, indwelling urinary device inserted
guidewire recovered
sterile technique, catheter placed/guidewire recovered/sterile dressing applied/lumen(s) aspirated and flushed/ultrasound guidance
sterile technique, catheter placed/lumen(s) aspirated and flushed/sterile dressing applied/ultrasound guidance/guidewire recovered

## 2020-11-13 NOTE — PROCEDURE NOTE - NSINFORMCONSENT_GEN_A_CORE
This was an emergent procedure.
Benefits, risks, and possible complications of procedure explained to patient/caregiver who verbalized understanding and gave verbal consent.

## 2020-11-13 NOTE — PROCEDURE NOTE - NSPOSTPRCRAD_GEN_A_CORE
central line located in the/central line located in the superior vena cava
central line located in the superior vena cava/depth of insertion/no pneumothorax/post procedure radiography not performed/central line located in the/line adjusted to depth of insertion

## 2020-11-13 NOTE — PROCEDURE NOTE - NSINDICATIONS_GEN_A_CORE
critical illness/emergency venous access
strict intake/output during critical illness
dialysis/CRRT
hemodynamic monitoring/critical illness/emergency venous access/venous access

## 2020-11-13 NOTE — PROGRESS NOTE ADULT - SUBJECTIVE AND OBJECTIVE BOX
Dr. Vaughan  Office [phone]  Cell [phone]  Pallavi GRAJEDA  Cell [phone]    RENAL PROGRESS NOTE: DATE OF SERVICE 11-13-20 @ 14:03    Patient is a 85y old  Male who presents with a chief complaint of Shortness of breath (13 Nov 2020 11:23)      Patient seen and examined at bedside in ICU. Intubated    VITALS:  T(F): 97.4 (11-13-20 @ 05:31), Max: 98 (11-12-20 @ 22:15)  HR: 88 (11-13-20 @ 12:46)  BP: 91/68 (11-13-20 @ 09:30)  RR: 19 (11-13-20 @ 09:30)  SpO2: 78% (11-13-20 @ 12:46)  Wt(kg): --    11-12 @ 07:01  -  11-13 @ 07:00  --------------------------------------------------------  IN: 100 mL / OUT: 165 mL / NET: -65 mL          PHYSICAL EXAM:  Constitutional: Intubated  Neck: No JVD  Respiratory: Bilateral basal crackles+  Cardiovascular: S1, S2, RRR  Gastrointestinal: BS+, soft, NT/ND  Extremities: 3-4+ peripheral edema    Hospital Medications:   MEDICATIONS  (STANDING):  ALBUTerol    90 MICROgram(s) HFA Inhaler 2 Puff(s) Inhalation every 6 hours  chlorhexidine 0.12% Liquid 15 milliLiter(s) Oral Mucosa every 12 hours  chlorhexidine 2% Cloths 1 Application(s) Topical <User Schedule>  fentaNYL   Infusion 1 MICROgram(s)/kG/Hr (7.65 mL/Hr) IV Continuous <Continuous>  fentaNYL   Infusion. 0.5 MICROgram(s)/kG/Hr (3.83 mL/Hr) IV Continuous <Continuous>  finasteride 5 milliGRAM(s) Oral daily  furosemide Infusion 10 mG/Hr (5 mL/Hr) IV Continuous <Continuous>  norepinephrine Infusion 0.05 MICROgram(s)/kG/Min (3.59 mL/Hr) IV Continuous <Continuous>  pancrelipase  (CREON  6,000 Lipase Units) 6 Capsule(s) Oral three times a day with meals  pantoprazole  Injectable 40 milliGRAM(s) IV Push daily  phenylephrine    Infusion 0.3 MICROgram(s)/kG/Min (4.3 mL/Hr) IV Continuous <Continuous>  piperacillin/tazobactam IVPB.. 3.375 Gram(s) IV Intermittent every 12 hours  propofol Infusion 10 MICROgram(s)/kG/Min (4.59 mL/Hr) IV Continuous <Continuous>  ranolazine 500 milliGRAM(s) Oral two times a day  sodium bicarbonate 650 milliGRAM(s) Oral every 8 hours  sodium bicarbonate  Injectable 100 milliEquivalent(s) IV Push once  tamsulosin 0.4 milliGRAM(s) Oral at bedtime  vancomycin  IVPB 500 milliGRAM(s) IV Intermittent once      LABS:  11-13    129<L>  |  99  |  55<H>  ----------------------------<  81  4.8   |  13<L>  |  5.96<H>    Ca    8.1<L>      13 Nov 2020 06:39  Phos  6.1     11-13  Mg     2.5     11-13    TPro  5.5<L>  /  Alb  1.5<L>  /  TBili  0.4  /  DBili      /  AST  56<H>  /  ALT  23  /  AlkPhos  71  11-13    Creatinine Trend: 5.96 <--, 5.37 <--, 5.60 <--    Albumin, Serum: 1.5 g/dL (11-13 @ 06:39)  Phosphorus Level, Serum: 6.1 mg/dL (11-13 @ 06:39)  Albumin, Serum: 1.5 g/dL (11-12 @ 17:23)                              7.2    15.77 )-----------( 261      ( 13 Nov 2020 06:39 )             22.0     Urine Studies:  Urinalysis - [11-12-20 @ 18:04]      Color Yellow / Appearance Clear / SG 1.025 / pH 5.0      Gluc Negative / Ketone Negative  / Bili Negative / Urobili Negative       Blood Moderate / Protein 100 / Leuk Est Trace / Nitrite Negative      RBC 5-10 / WBC 3-5 / Hyaline 0-2 / Gran 5-10 / Sq Epi  / Non Sq Epi Few / Bacteria Few      TSH 2.01      [11-13-20 @ 06:39]  Lipid: chol 172, TG 83, HDL 46, LDL --      [11-13-20 @ 06:39]        RADIOLOGY & ADDITIONAL STUDIES:

## 2020-11-13 NOTE — CONSULT NOTE ADULT - SUBJECTIVE AND OBJECTIVE BOX
84 yo male with PMHx of CAD with stent, Afib on Eliquis, HTN, HLD, Gout, who presented to ED for generalized weakness for the past 2 weeks, generalized deteriorating progressively, and worsening cough. Son also reports that the patient's mental status has been slowly deteriorating. Per son, patient was discharged from [facility] 2.5 weeks ago where he was diagnosed with acute renal failure with a creatinine of 5 and PNA. Ever since discharge, the patient has been able to barely move with persistent productive cough with yellow/green sputum. Since yesterday, patient has been unable to get out of bed. Denies any nausea, vomiting, abdominal pain, dysuria, hematuria, or any other complaints. Pt currently on bipap and unable to provide any history, information obtained from ER attending and patient chart.     Vascular consult: Consulted for temporary hemodialysis access. Patient intubated, sedated, on pressure support.  INR elevated.    PAST MEDICAL & SURGICAL HISTORY:  HLD (hyperlipidemia)  HTN (hypertension)  Afib  Gout  CAD (coronary artery disease)  No significant past surgical history      Allergies  No Known Allergies  Intolerances    Vital Signs Last 24 Hrs  T(C): 36.4 (2020 16:56), Max: 37.1 (2020 09:30)  T(F): 97.6 (2020 16:56), Max: 98.8 (2020 09:30)  HR: 85 (2020 16:56) (85 - 104)  BP: 125/66 (2020 08:58) (125/66 - 125/66)  BP(mean): --  RR: 22 (2020 08:58) (22 - 22)  SpO2: 94% (2020 12:53) (87% - 94%)    ABG - ( 2020 13:07 )  pH, Arterial: 7.24  pH, Blood: x     /  pCO2: 40    /  pO2: 62    / HCO3: 16    / Base Excess: -9.7  /  SaO2: 86          LABS:                        7.8    14.44 )-----------( 290      ( 2020 10:00 )             24.0     11-12    128<L>  |  98  |  51<H>  ----------------------------<  97  4.2   |  16<L>  |  5.60<H>    Ca    8.7      2020 10:00    TPro  6.1  /  Alb  1.7<L>  /  TBili  0.5  /  DBili  x   /  AST  39  /  ALT  21  /  AlkPhos  71  11-12      CARDIAC MARKERS ( 2020 10:00 )  0.019 ng/mL / x     / x     / x     / x          CAPILLARY BLOOD GLUCOSE      POCT Blood Glucose.: 109 mg/dL (2020 09:40)    PT/INR - ( 2020 10:00 )   PT: 20.3 sec;   INR: 1.75 ratio         PTT - ( 2020 10:00 )  PTT:39.3 sec    CULTURES:    Physical Examination:  PHYSICAL EXAM:  GENERAL: Elderly male lying on bed, appears short of breath  HEAD:  Atraumatic, Normocephalic  EYES: EOMI, PERRLA, conjunctiva and sclera clear  NECK: Supple, No JVD  CHEST/LUNG: b/l diffuse crackles and expiratory wheeze  HEART: Irregular heart rate, tachycardiac  ABDOMEN: Soft, Nontender, Nondistended; Bowel sounds present  EXTREMITIES:  2+ Peripheral Pulses, No significant edema  PSYCH: Confused and lethargic  NEUROLOGY: non-focal  SKIN: No rashes or lesions    PAST MEDICAL & SURGICAL HISTORY:  HLD (hyperlipidemia)  HTN (hypertension)  Afib  Gout  CAD (coronary artery disease)  No significant past surgical history    MEDICATIONS  (STANDING):  ALBUTerol    90 MICROgram(s) HFA Inhaler 2 Puff(s) Inhalation every 6 hours  buMETAnide Infusion 1 mG/Hr (5 mL/Hr) IV Continuous <Continuous>  chlorhexidine 0.12% Liquid 15 milliLiter(s) Oral Mucosa every 12 hours  chlorhexidine 2% Cloths 1 Application(s) Topical <User Schedule>  fentaNYL   Infusion 1 MICROgram(s)/kG/Hr (7.65 mL/Hr) IV Continuous <Continuous>  fentaNYL   Infusion. 0.5 MICROgram(s)/kG/Hr (3.83 mL/Hr) IV Continuous <Continuous>  finasteride 5 milliGRAM(s) Oral daily  norepinephrine Infusion 0.05 MICROgram(s)/kG/Min (3.59 mL/Hr) IV Continuous <Continuous>  pancrelipase  (CREON  6,000 Lipase Units) 6 Capsule(s) Oral three times a day with meals  pantoprazole  Injectable 40 milliGRAM(s) IV Push daily  phenylephrine    Infusion 0.3 MICROgram(s)/kG/Min (4.3 mL/Hr) IV Continuous <Continuous>  piperacillin/tazobactam IVPB.. 3.375 Gram(s) IV Intermittent every 12 hours  propofol Infusion 10 MICROgram(s)/kG/Min (4.59 mL/Hr) IV Continuous <Continuous>  ranolazine 500 milliGRAM(s) Oral two times a day  sodium bicarbonate 650 milliGRAM(s) Oral every 8 hours  tamsulosin 0.4 milliGRAM(s) Oral at bedtime  vancomycin  IVPB 500 milliGRAM(s) IV Intermittent once    MEDICATIONS  (PRN):      Allergies    No Known Allergies    Intolerances        Vital Signs Last 24 Hrs  T(C): 36.3 (2020 05:31), Max: 36.7 (2020 22:15)  T(F): 97.4 (2020 05:31), Max: 98 (2020 22:15)  HR: 88 (2020 12:46) (73 - 102)  BP: 91/68 (2020 09:30) (89/55 - 172/139)  BP(mean): 73 (2020 09:30) (61 - 147)  RR: 19 (2020 09:30) (16 - 31)  SpO2: 78% (2020 12:46) (50% - 98%)    Physical:  Gen: A&Ox3. NAD  Abd: Soft ND, NT        I&O's Detail    2020 07:01  -  2020 07:00  --------------------------------------------------------  IN:    IV PiggyBack: 100 mL  Total IN: 100 mL    OUT:    Voided (mL): 165 mL  Total OUT: 165 mL    Total NET: -65 mL          LABS:                        7.2    15.77 )-----------( 261      ( 2020 06:39 )             22.0              11-13    129<L>  |  99  |  55<H>  ----------------------------<  81  4.8   |  13<L>  |  5.96<H>    Ca    8.1<L>      2020 06:39  Phos  6.1       Mg     2.5         TPro  5.5<L>  /  Alb  1.5<L>  /  TBili  0.4  /  DBili  x   /  AST  56<H>  /  ALT  23  /  AlkPhos  71  -            PT/INR - ( 2020 13:38 )   PT: 22.4 sec;   INR: 1.94 ratio         PTT - ( 2020 13:40 )  PTT:34.4 sec  Urinalysis Basic - ( 2020 18:04 )    Color: Yellow / Appearance: Clear / S.025 / pH: x  Gluc: x / Ketone: Negative  / Bili: Negative / Urobili: Negative   Blood: x / Protein: 100 / Nitrite: Negative   Leuk Esterase: Trace / RBC: 5-10 /HPF / WBC 3-5 /HPF   Sq Epi: x / Non Sq Epi: Few /HPF / Bacteria: Few /HPF        RADIOLOGY & ADDITIONAL STUDIES:     86 yo male with PMHx of CAD with stent, Afib on Eliquis, HTN, HLD, Gout, who presented to ED for generalized weakness for the past 2 weeks, generalized deteriorating progressively, and worsening cough. Son also reports that the patient's mental status has been slowly deteriorating. Per son, patient was discharged from [facility].5 weeks ago where he was diagnosed with acute renal failure with a creatinine of 5 and PNA. Ever since discharge, the patient has been able to barely move with persistent productive cough with yellow/green sputum. Since yesterday, patient has been unable to get out of bed. Denies any nausea, vomiting, abdominal pain, dysuria, hematuria, or any other complaints. Pt currently on bipap and unable to provide any history, information obtained from ER attending and patient chart.     Vascular consult: Consulted for temporary hemodialysis access. Patient intubated, sedated, on pressure support.  INR elevated.    PAST MEDICAL & SURGICAL HISTORY:  HLD (hyperlipidemia)  HTN (hypertension)  Afib  Gout  CAD (coronary artery disease)  No significant past surgical history      Allergies  No Known Allergies  Intolerances    Vital Signs Last 24 Hrs  T(C): 36.4 (2020 16:56), Max: 37.1 (2020 09:30)  T(F): 97.6 (2020 16:56), Max: 98.8 (2020 09:30)  HR: 85 (2020 16:56) (85 - 104)  BP: 125/66 (2020 08:58) (125/66 - 125/66)  BP(mean): --  RR: 22 (2020 08:58) (22 - 22)  SpO2: 94% (2020 12:53) (87% - 94%)    ABG - ( 2020 13:07 )  pH, Arterial: 7.24  pH, Blood: x     /  pCO2: 40    /  pO2: 62    / HCO3: 16    / Base Excess: -9.7  /  SaO2: 86          LABS:                        7.8    14.44 )-----------( 290      ( 2020 10:00 )             24.0     11-12    128<L>  |  98  |  51<H>  ----------------------------<  97  4.2   |  16<L>  |  5.60<H>    Ca    8.7      2020 10:00    TPro  6.1  /  Alb  1.7<L>  /  TBili  0.5  /  DBili  x   /  AST  39  /  ALT  21  /  AlkPhos  71  11-12      CARDIAC MARKERS ( 2020 10:00 )  0.019 ng/mL / x     / x     / x     / x          CAPILLARY BLOOD GLUCOSE      POCT Blood Glucose.: 109 mg/dL (2020 09:40)    PT/INR - ( 2020 10:00 )   PT: 20.3 sec;   INR: 1.75 ratio         PTT - ( 2020 10:00 )  PTT:39.3 sec    CULTURES:    Physical Examination:  PHYSICAL EXAM:  GENERAL: Elderly male, Intubated sedated.   Chest: equal chest rise bilaterally  EXTREMITIES:  2+ Peripheral Pulses, No significant edema    PAST MEDICAL & SURGICAL HISTORY:  HLD (hyperlipidemia)  HTN (hypertension)  Afib  Gout  CAD (coronary artery disease)  No significant past surgical history    MEDICATIONS  (STANDING):  ALBUTerol    90 MICROgram(s) HFA Inhaler 2 Puff(s) Inhalation every 6 hours  buMETAnide Infusion 1 mG/Hr (5 mL/Hr) IV Continuous <Continuous>  chlorhexidine 0.12% Liquid 15 milliLiter(s) Oral Mucosa every 12 hours  chlorhexidine 2% Cloths 1 Application(s) Topical <User Schedule>  fentaNYL   Infusion 1 MICROgram(s)/kG/Hr (7.65 mL/Hr) IV Continuous <Continuous>  fentaNYL   Infusion. 0.5 MICROgram(s)/kG/Hr (3.83 mL/Hr) IV Continuous <Continuous>  finasteride 5 milliGRAM(s) Oral daily  norepinephrine Infusion 0.05 MICROgram(s)/kG/Min (3.59 mL/Hr) IV Continuous <Continuous>  pancrelipase  (CREON  6,000 Lipase Units) 6 Capsule(s) Oral three times a day with meals  pantoprazole  Injectable 40 milliGRAM(s) IV Push daily  phenylephrine    Infusion 0.3 MICROgram(s)/kG/Min (4.3 mL/Hr) IV Continuous <Continuous>  piperacillin/tazobactam IVPB.. 3.375 Gram(s) IV Intermittent every 12 hours  propofol Infusion 10 MICROgram(s)/kG/Min (4.59 mL/Hr) IV Continuous <Continuous>  ranolazine 500 milliGRAM(s) Oral two times a day  sodium bicarbonate 650 milliGRAM(s) Oral every 8 hours  tamsulosin 0.4 milliGRAM(s) Oral at bedtime  vancomycin  IVPB 500 milliGRAM(s) IV Intermittent once    Allergies:  No Known Allergies  Intolerances    Vital Signs Last 24 Hrs  T(C): 36.3 (2020 05:31), Max: 36.7 (2020 22:15)  T(F): 97.4 (2020 05:31), Max: 98 (2020 22:15)  HR: 88 (2020 12:46) (73 - 102)  BP: 91/68 (2020 09:30) (89/55 - 172/139)  BP(mean): 73 (2020 09:30) (61 - 147)  RR: 19 (2020 09:30) (16 - 31)  SpO2: 78% (2020 12:46) (50% - 98%)    I&O's Detail    2020 07:01  -  2020 07:00  --------------------------------------------------------  IN:    IV PiggyBack: 100 mL  Total IN: 100 mL    OUT:    Voided (mL): 165 mL  Total OUT: 165 mL    Total NET: -65 mL    LABS:                        7.2    15.77 )-----------( 261      ( 2020 06:39 )             22.0                  129<L>  |  99  |  55<H>  ----------------------------<  81  4.8   |  13<L>  |  5.96<H>    Ca    8.1<L>      2020 06:39  Phos  6.1       Mg     2.5         TPro  5.5<L>  /  Alb  1.5<L>  /  TBili  0.4  /  DBili  x   /  AST  56<H>  /  ALT  23  /  AlkPhos  71  11-13            PT/INR - ( 2020 13:38 )   PT: 22.4 sec;   INR: 1.94 ratio         PTT - ( 2020 13:40 )  PTT:34.4 sec  Urinalysis Basic - ( 2020 18:04 )    Color: Yellow / Appearance: Clear / S.025 / pH: x  Gluc: x / Ketone: Negative  / Bili: Negative / Urobili: Negative   Blood: x / Protein: 100 / Nitrite: Negative   Leuk Esterase: Trace / RBC: 5-10 /HPF / WBC 3-5 /HPF   Sq Epi: x / Non Sq Epi: Few /HPF / Bacteria: Few /HPF

## 2020-11-13 NOTE — PROGRESS NOTE ADULT - SUBJECTIVE AND OBJECTIVE BOX
INTERVAL HPI/OVERNIGHT EVENTS: ***    PRESSORS: [ ] YES [ ] NO  WHICH:    ANTIBIOTICS:                      Antimicrobial:  piperacillin/tazobactam IVPB.. 3.375 Gram(s) IV Intermittent every 12 hours  vancomycin  IVPB 500 milliGRAM(s) IV Intermittent once    Cardiovascular:  furosemide Infusion 10 mG/Hr IV Continuous <Continuous>  phenylephrine    Infusion 0.3 MICROgram(s)/kG/Min IV Continuous <Continuous>  ranolazine 500 milliGRAM(s) Oral two times a day  tamsulosin 0.4 milliGRAM(s) Oral at bedtime    Pulmonary:  ALBUTerol    90 MICROgram(s) HFA Inhaler 2 Puff(s) Inhalation every 6 hours    Hematalogic:    Other:  chlorhexidine 0.12% Liquid 15 milliLiter(s) Oral Mucosa every 12 hours  chlorhexidine 2% Cloths 1 Application(s) Topical <User Schedule>  fentaNYL    Injectable 50 MICROGram(s) IV Push once  fentaNYL   Infusion 1 MICROgram(s)/kG/Hr IV Continuous <Continuous>  fentaNYL   Infusion. 0.5 MICROgram(s)/kG/Hr IV Continuous <Continuous>  finasteride 5 milliGRAM(s) Oral daily  pancrelipase  (CREON  6,000 Lipase Units) 6 Capsule(s) Oral three times a day with meals  pantoprazole  Injectable 40 milliGRAM(s) IV Push daily  propofol Infusion 10 MICROgram(s)/kG/Min IV Continuous <Continuous>  sodium bicarbonate 650 milliGRAM(s) Oral every 8 hours    ALBUTerol    90 MICROgram(s) HFA Inhaler 2 Puff(s) Inhalation every 6 hours  chlorhexidine 0.12% Liquid 15 milliLiter(s) Oral Mucosa every 12 hours  chlorhexidine 2% Cloths 1 Application(s) Topical <User Schedule>  fentaNYL    Injectable 50 MICROGram(s) IV Push once  fentaNYL   Infusion 1 MICROgram(s)/kG/Hr IV Continuous <Continuous>  fentaNYL   Infusion. 0.5 MICROgram(s)/kG/Hr IV Continuous <Continuous>  finasteride 5 milliGRAM(s) Oral daily  furosemide Infusion 10 mG/Hr IV Continuous <Continuous>  pancrelipase  (CREON  6,000 Lipase Units) 6 Capsule(s) Oral three times a day with meals  pantoprazole  Injectable 40 milliGRAM(s) IV Push daily  phenylephrine    Infusion 0.3 MICROgram(s)/kG/Min IV Continuous <Continuous>  piperacillin/tazobactam IVPB.. 3.375 Gram(s) IV Intermittent every 12 hours  propofol Infusion 10 MICROgram(s)/kG/Min IV Continuous <Continuous>  ranolazine 500 milliGRAM(s) Oral two times a day  sodium bicarbonate 650 milliGRAM(s) Oral every 8 hours  tamsulosin 0.4 milliGRAM(s) Oral at bedtime  vancomycin  IVPB 500 milliGRAM(s) IV Intermittent once    Drug Dosing Weight  Height (cm): 177.8 (2020 08:58)  Weight (kg): 76.5 (2020 08:58)  BMI (kg/m2): 24.2 (2020 08:58)  BSA (m2): 1.94 (2020 08:58)    CENTRAL LINE: [ ] YES [ ] NO  LOCATION:   DATE INSERTED:  REMOVE: [ ] YES [ ] NO  EXPLAIN:    ESCOBAR: [ ] YES [ ] NO    DATE INSERTED:  REMOVE:  [ ] YES [ ] NO  EXPLAIN:    A-LINE:  [ ] YES [ ] NO  LOCATION:   DATE INSERTED:  REMOVE:  [ ] YES [ ] NO  EXPLAIN:    PMH -reviewed admission note, no change since admission    ICU Vital Signs Last 24 Hrs  T(C): 36.3 (2020 05:31), Max: 36.7 (2020 22:15)  T(F): 97.4 (2020 05:31), Max: 98 (2020 22:15)  HR: 85 (2020 10:31) (73 - 102)  BP: 91/68 (2020 09:30) (89/55 - 172/139)  BP(mean): 73 (2020 09:30) (61 - 147)  ABP: --  ABP(mean): --  RR: 19 (2020 09:30) (16 - 31)  SpO2: 74% (2020 10:31) (50% - 98%)      ABG - ( 2020 13:07 )  pH, Arterial: 7.24  pH, Blood: x     /  pCO2: 40    /  pO2: 62    / HCO3: 16    / Base Excess: -9.7  /  SaO2: 86                     @ 07:01  -   @ 07:00  --------------------------------------------------------  IN: 100 mL / OUT: 165 mL / NET: -65 mL        Mode: AC/ CMV (Assist Control/ Continuous Mandatory Ventilation)  RR (machine): 20  TV (machine): 450  FiO2: 100  PEEP: 8  ITime: 1  MAP: 14  PIP: 29      PHYSICAL EXAM:    GENERAL: NAD, well-groomed, well-developed  HEAD:  Atraumatic, Normocephalic  EYES: EOMI, PERRLA, conjunctiva and sclera clear  ENMT: No tonsillar erythema, exudates, or enlargement; Moist mucous membranes, Good dentition, No lesions  NECK: Supple, normal appearance, No JVD; Normal thyroid; Trachea midline  NERVOUS SYSTEM:  Alert & Oriented X3, Good concentration; Motor Strength 5/5 B/L upper and lower extremities; DTRs 2+ intact and symmetric  CHEST/LUNG: No chest deformity; Normal percussion bilaterally; No rales, rhonchi, wheezing   HEART: Regular rate and rhythm; No murmurs, rubs, or gallops  ABDOMEN: Soft, Nontender, Nondistended; Bowel sounds present  EXTREMITIES:  2+ Peripheral Pulses, No clubbing, cyanosis, or edema  LYMPH: No lymphadenopathy noted  SKIN: No rashes or lesions; Good capillary refill      LABS:  CBC Full  -  ( 2020 06:39 )  WBC Count : 15.77 K/uL  RBC Count : 2.14 M/uL  Hemoglobin : 7.2 g/dL  Hematocrit : 22.0 %  Platelet Count - Automated : 261 K/uL  Mean Cell Volume : 102.8 fl  Mean Cell Hemoglobin : 33.6 pg  Mean Cell Hemoglobin Concentration : 32.7 gm/dL  Auto Neutrophil # : 14.33 K/uL  Auto Lymphocyte # : 0.50 K/uL  Auto Monocyte # : 0.76 K/uL  Auto Eosinophil # : 0.05 K/uL  Auto Basophil # : 0.03 K/uL  Auto Neutrophil % : 90.9 %  Auto Lymphocyte % : 3.2 %  Auto Monocyte % : 4.8 %  Auto Eosinophil % : 0.3 %  Auto Basophil % : 0.2 %    11    129<L>  |  99  |  55<H>  ----------------------------<  81  4.8   |  13<L>  |  5.96<H>    Ca    8.1<L>      2020 06:39  Phos  6.1     11-  Mg     2.5     -13    TPro  5.5<L>  /  Alb  1.5<L>  /  TBili  0.4  /  DBili  x   /  AST  56<H>  /  ALT  23  /  AlkPhos  71  11-13    PT/INR - ( 2020 10:00 )   PT: 20.3 sec;   INR: 1.75 ratio         PTT - ( 2020 10:00 )  PTT:39.3 sec  Urinalysis Basic - ( 2020 18:04 )    Color: Yellow / Appearance: Clear / S.025 / pH: x  Gluc: x / Ketone: Negative  / Bili: Negative / Urobili: Negative   Blood: x / Protein: 100 / Nitrite: Negative   Leuk Esterase: Trace / RBC: 5-10 /HPF / WBC 3-5 /HPF   Sq Epi: x / Non Sq Epi: Few /HPF / Bacteria: Few /HPF          RADIOLOGY & ADDITIONAL STUDIES REVIEWED:  ***    GOALS OF CARE DISCUSSION WITH PATIENT/FAMILY/PROXY:    CRITICAL CARE TIME SPENT: 35 minutes INTERVAL HPI/OVERNIGHT EVENTS: Patient was placed on BIPAP for work of breathing . he was desaturating and lethargic so he was intubated in am for hypoxia. Patients INR was elevated so he 1 FFP was ordered . willl repeat INR    PRESSORS: [ ] YES [ ] NO  WHICH:    ANTIBIOTICS:                      Antimicrobial:  piperacillin/tazobactam IVPB.. 3.375 Gram(s) IV Intermittent every 12 hours  vancomycin  IVPB 500 milliGRAM(s) IV Intermittent once    Cardiovascular:  furosemide Infusion 10 mG/Hr IV Continuous <Continuous>  phenylephrine    Infusion 0.3 MICROgram(s)/kG/Min IV Continuous <Continuous>  ranolazine 500 milliGRAM(s) Oral two times a day  tamsulosin 0.4 milliGRAM(s) Oral at bedtime    Pulmonary:  ALBUTerol    90 MICROgram(s) HFA Inhaler 2 Puff(s) Inhalation every 6 hours    Hematalogic:    Other:  chlorhexidine 0.12% Liquid 15 milliLiter(s) Oral Mucosa every 12 hours  chlorhexidine 2% Cloths 1 Application(s) Topical <User Schedule>  fentaNYL    Injectable 50 MICROGram(s) IV Push once  fentaNYL   Infusion 1 MICROgram(s)/kG/Hr IV Continuous <Continuous>  fentaNYL   Infusion. 0.5 MICROgram(s)/kG/Hr IV Continuous <Continuous>  finasteride 5 milliGRAM(s) Oral daily  pancrelipase  (CREON  6,000 Lipase Units) 6 Capsule(s) Oral three times a day with meals  pantoprazole  Injectable 40 milliGRAM(s) IV Push daily  propofol Infusion 10 MICROgram(s)/kG/Min IV Continuous <Continuous>  sodium bicarbonate 650 milliGRAM(s) Oral every 8 hours    ALBUTerol    90 MICROgram(s) HFA Inhaler 2 Puff(s) Inhalation every 6 hours  chlorhexidine 0.12% Liquid 15 milliLiter(s) Oral Mucosa every 12 hours  chlorhexidine 2% Cloths 1 Application(s) Topical <User Schedule>  fentaNYL    Injectable 50 MICROGram(s) IV Push once  fentaNYL   Infusion 1 MICROgram(s)/kG/Hr IV Continuous <Continuous>  fentaNYL   Infusion. 0.5 MICROgram(s)/kG/Hr IV Continuous <Continuous>  finasteride 5 milliGRAM(s) Oral daily  furosemide Infusion 10 mG/Hr IV Continuous <Continuous>  pancrelipase  (CREON  6,000 Lipase Units) 6 Capsule(s) Oral three times a day with meals  pantoprazole  Injectable 40 milliGRAM(s) IV Push daily  phenylephrine    Infusion 0.3 MICROgram(s)/kG/Min IV Continuous <Continuous>  piperacillin/tazobactam IVPB.. 3.375 Gram(s) IV Intermittent every 12 hours  propofol Infusion 10 MICROgram(s)/kG/Min IV Continuous <Continuous>  ranolazine 500 milliGRAM(s) Oral two times a day  sodium bicarbonate 650 milliGRAM(s) Oral every 8 hours  tamsulosin 0.4 milliGRAM(s) Oral at bedtime  vancomycin  IVPB 500 milliGRAM(s) IV Intermittent once    Drug Dosing Weight  Height (cm): 177.8 (2020 08:58)  Weight (kg): 76.5 (2020 08:58)  BMI (kg/m2): 24.2 (2020 08:58)  BSA (m2): 1.94 (2020 08:58)    CENTRAL LINE: [ ] YES [ ] NO  LOCATION:   DATE INSERTED:  REMOVE: [ ] YES [ ] NO  EXPLAIN:    ESCOBAR: [ ] YES [ ] NO    DATE INSERTED:  REMOVE:  [ ] YES [ ] NO  EXPLAIN:    A-LINE:  [ ] YES [ ] NO  LOCATION:   DATE INSERTED:  REMOVE:  [ ] YES [ ] NO  EXPLAIN:    PMH -reviewed admission note, no change since admission    ICU Vital Signs Last 24 Hrs  T(C): 36.3 (2020 05:31), Max: 36.7 (2020 22:15)  T(F): 97.4 (2020 05:31), Max: 98 (2020 22:15)  HR: 85 (2020 10:31) (73 - 102)  BP: 91/68 (2020 09:30) (89/55 - 172/139)  BP(mean): 73 (2020 09:30) (61 - 147)  ABP: --  ABP(mean): --  RR: 19 (2020 09:30) (16 - 31)  SpO2: 74% (2020 10:31) (50% - 98%)      ABG - ( 2020 13:07 )  pH, Arterial: 7.24  pH, Blood: x     /  pCO2: 40    /  pO2: 62    / HCO3: 16    / Base Excess: -9.7  /  SaO2: 86                     @ 07:01  -  11-13 @ 07:00  --------------------------------------------------------  IN: 100 mL / OUT: 165 mL / NET: -65 mL        Mode: AC/ CMV (Assist Control/ Continuous Mandatory Ventilation)  RR (machine): 20  TV (machine): 450  FiO2: 100  PEEP: 8  ITime: 1  MAP: 14  PIP: 29      PHYSICAL EXAM:    GENERAL: intubated and sedated.   HEAD:  Atraumatic, Normocephalic  EYES:conjunctiva and sclera clear  ENMT: No tonsillar erythema, exudates, or enlargement; Moist mucous membranes,   NECK: Supple, normal appearance, No JVD; Normal thyroid; Trachea midline  NERVOUS SYSTEM:  intubated and sedated  CHEST/LUNG: No chest deformity; Normal percussion bilaterally;   HEART: Regular rate and rhythm; No murmurs, rubs, or gallops  ABDOMEN: Soft, Nontender, Nondistended; Bowel sounds present  EXTREMITIES:  2+ Peripheral Pulses, No clubbing, cyanosis, or edema  LYMPH: No lymphadenopathy noted  SKIN: No rashes or lesions; Good capillary refill      LABS:  CBC Full  -  ( 2020 06:39 )  WBC Count : 15.77 K/uL  RBC Count : 2.14 M/uL  Hemoglobin : 7.2 g/dL  Hematocrit : 22.0 %  Platelet Count - Automated : 261 K/uL  Mean Cell Volume : 102.8 fl  Mean Cell Hemoglobin : 33.6 pg  Mean Cell Hemoglobin Concentration : 32.7 gm/dL  Auto Neutrophil # : 14.33 K/uL  Auto Lymphocyte # : 0.50 K/uL  Auto Monocyte # : 0.76 K/uL  Auto Eosinophil # : 0.05 K/uL  Auto Basophil # : 0.03 K/uL  Auto Neutrophil % : 90.9 %  Auto Lymphocyte % : 3.2 %  Auto Monocyte % : 4.8 %  Auto Eosinophil % : 0.3 %  Auto Basophil % : 0.2 %        129<L>  |  99  |  55<H>  ----------------------------<  81  4.8   |  13<L>  |  5.96<H>    Ca    8.1<L>      2020 06:39  Phos  6.1       Mg     2.5         TPro  5.5<L>  /  Alb  1.5<L>  /  TBili  0.4  /  DBili  x   /  AST  56<H>  /  ALT  23  /  AlkPhos  71  -13    PT/INR - ( 2020 10:00 )   PT: 20.3 sec;   INR: 1.75 ratio         PTT - ( 2020 10:00 )  PTT:39.3 sec  Urinalysis Basic - ( 2020 18:04 )    Color: Yellow / Appearance: Clear / S.025 / pH: x  Gluc: x / Ketone: Negative  / Bili: Negative / Urobili: Negative   Blood: x / Protein: 100 / Nitrite: Negative   Leuk Esterase: Trace / RBC: 5-10 /HPF / WBC 3-5 /HPF   Sq Epi: x / Non Sq Epi: Few /HPF / Bacteria: Few /HPF          RADIOLOGY & ADDITIONAL STUDIES REVIEWED:      GOALS OF CARE DISCUSSION WITH PATIENT/FAMILY/PROXY:    CRITICAL CARE TIME SPENT: 35 minutes INTERVAL HPI/OVERNIGHT EVENTS: Patient was placed on BIPAP for work of breathing . he was desaturating and lethargic so he was intubated in am for hypoxia. Patients INR was elevated so he 1 FFP was ordered . willl repeat INR    PRESSORS: [ ] YES [ ] NO  WHICH:    ANTIBIOTICS:                      Antimicrobial:  piperacillin/tazobactam IVPB.. 3.375 Gram(s) IV Intermittent every 12 hours  vancomycin  IVPB 500 milliGRAM(s) IV Intermittent once    Cardiovascular:  furosemide Infusion 10 mG/Hr IV Continuous <Continuous>  phenylephrine    Infusion 0.3 MICROgram(s)/kG/Min IV Continuous <Continuous>  ranolazine 500 milliGRAM(s) Oral two times a day  tamsulosin 0.4 milliGRAM(s) Oral at bedtime    Pulmonary:  ALBUTerol    90 MICROgram(s) HFA Inhaler 2 Puff(s) Inhalation every 6 hours    Hematalogic:    Other:  chlorhexidine 0.12% Liquid 15 milliLiter(s) Oral Mucosa every 12 hours  chlorhexidine 2% Cloths 1 Application(s) Topical <User Schedule>  fentaNYL    Injectable 50 MICROGram(s) IV Push once  fentaNYL   Infusion 1 MICROgram(s)/kG/Hr IV Continuous <Continuous>  fentaNYL   Infusion. 0.5 MICROgram(s)/kG/Hr IV Continuous <Continuous>  finasteride 5 milliGRAM(s) Oral daily  pancrelipase  (CREON  6,000 Lipase Units) 6 Capsule(s) Oral three times a day with meals  pantoprazole  Injectable 40 milliGRAM(s) IV Push daily  propofol Infusion 10 MICROgram(s)/kG/Min IV Continuous <Continuous>  sodium bicarbonate 650 milliGRAM(s) Oral every 8 hours    ALBUTerol    90 MICROgram(s) HFA Inhaler 2 Puff(s) Inhalation every 6 hours  chlorhexidine 0.12% Liquid 15 milliLiter(s) Oral Mucosa every 12 hours  chlorhexidine 2% Cloths 1 Application(s) Topical <User Schedule>  fentaNYL    Injectable 50 MICROGram(s) IV Push once  fentaNYL   Infusion 1 MICROgram(s)/kG/Hr IV Continuous <Continuous>  fentaNYL   Infusion. 0.5 MICROgram(s)/kG/Hr IV Continuous <Continuous>  finasteride 5 milliGRAM(s) Oral daily  furosemide Infusion 10 mG/Hr IV Continuous <Continuous>  pancrelipase  (CREON  6,000 Lipase Units) 6 Capsule(s) Oral three times a day with meals  pantoprazole  Injectable 40 milliGRAM(s) IV Push daily  phenylephrine    Infusion 0.3 MICROgram(s)/kG/Min IV Continuous <Continuous>  piperacillin/tazobactam IVPB.. 3.375 Gram(s) IV Intermittent every 12 hours  propofol Infusion 10 MICROgram(s)/kG/Min IV Continuous <Continuous>  ranolazine 500 milliGRAM(s) Oral two times a day  sodium bicarbonate 650 milliGRAM(s) Oral every 8 hours  tamsulosin 0.4 milliGRAM(s) Oral at bedtime  vancomycin  IVPB 500 milliGRAM(s) IV Intermittent once    Drug Dosing Weight  Height (cm): 177.8 (2020 08:58)  Weight (kg): 76.5 (2020 08:58)  BMI (kg/m2): 24.2 (2020 08:58)  BSA (m2): 1.94 (2020 08:58)    CENTRAL LINE: [ ] YES [ ] NO  LOCATION:   DATE INSERTED:  REMOVE: [ ] YES [ ] NO  EXPLAIN:    ESCOBAR: [ ] YES [ ] NO    DATE INSERTED:  REMOVE:  [ ] YES [ ] NO  EXPLAIN:    A-LINE:  [ ] YES [ ] NO  LOCATION:   DATE INSERTED:  REMOVE:  [ ] YES [ ] NO  EXPLAIN:    PMH -reviewed admission note, no change since admission    ICU Vital Signs Last 24 Hrs  T(C): 36.3 (2020 05:31), Max: 36.7 (2020 22:15)  T(F): 97.4 (2020 05:31), Max: 98 (2020 22:15)  HR: 85 (2020 10:31) (73 - 102)  BP: 91/68 (2020 09:30) (89/55 - 172/139)  BP(mean): 73 (2020 09:30) (61 - 147)  ABP: --  ABP(mean): --  RR: 19 (2020 09:30) (16 - 31)  SpO2: 74% (2020 10:31) (50% - 98%)      ABG - ( 2020 13:07 )  pH, Arterial: 7.24  pH, Blood: x     /  pCO2: 40    /  pO2: 62    / HCO3: 16    / Base Excess: -9.7  /  SaO2: 86                     @ 07:01  -  11-13 @ 07:00  --------------------------------------------------------  IN: 100 mL / OUT: 165 mL / NET: -65 mL        Mode: AC/ CMV (Assist Control/ Continuous Mandatory Ventilation)  RR (machine): 20  TV (machine): 450  FiO2: 100  PEEP: 8  ITime: 1  MAP: 14  PIP: 29      PHYSICAL EXAM:    GENERAL: intubated and sedated.   HEAD:  Atraumatic, Normocephalic  EYES:conjunctiva and sclera clear  ENMT: No tonsillar erythema, exudates, or enlargement; Moist mucous membranes,   NECK: Supple, normal appearance, No JVD; Normal thyroid; Trachea midline  NERVOUS SYSTEM:  intubated and sedated  CHEST/LUNG: No chest deformity; Normal percussion bilaterally;   HEART: Regular rate and rhythm; No murmurs, rubs, or gallops  ABDOMEN: Soft, Nontender, Nondistended; Bowel sounds present  EXTREMITIES:  2+ Peripheral Pulses, No clubbing, cyanosis, or edema  LYMPH: No lymphadenopathy noted  SKIN: No rashes or lesions; Good capillary refill      LABS:  CBC Full  -  ( 2020 06:39 )  WBC Count : 15.77 K/uL  RBC Count : 2.14 M/uL  Hemoglobin : 7.2 g/dL  Hematocrit : 22.0 %  Platelet Count - Automated : 261 K/uL  Mean Cell Volume : 102.8 fl  Mean Cell Hemoglobin : 33.6 pg  Mean Cell Hemoglobin Concentration : 32.7 gm/dL  Auto Neutrophil # : 14.33 K/uL  Auto Lymphocyte # : 0.50 K/uL  Auto Monocyte # : 0.76 K/uL  Auto Eosinophil # : 0.05 K/uL  Auto Basophil # : 0.03 K/uL  Auto Neutrophil % : 90.9 %  Auto Lymphocyte % : 3.2 %  Auto Monocyte % : 4.8 %  Auto Eosinophil % : 0.3 %  Auto Basophil % : 0.2 %        129<L>  |  99  |  55<H>  ----------------------------<  81  4.8   |  13<L>  |  5.96<H>    Ca    8.1<L>      2020 06:39  Phos  6.1       Mg     2.5         TPro  5.5<L>  /  Alb  1.5<L>  /  TBili  0.4  /  DBili  x   /  AST  56<H>  /  ALT  23  /  AlkPhos  71      PT/INR - ( 2020 10:00 )   PT: 20.3 sec;   INR: 1.75 ratio         PTT - ( 2020 10:00 )  PTT:39.3 sec  Urinalysis Basic - ( 2020 18:04 )    Color: Yellow / Appearance: Clear / S.025 / pH: x  Gluc: x / Ketone: Negative  / Bili: Negative / Urobili: Negative   Blood: x / Protein: 100 / Nitrite: Negative   Leuk Esterase: Trace / RBC: 5-10 /HPF / WBC 3-5 /HPF   Sq Epi: x / Non Sq Epi: Few /HPF / Bacteria: Few /HPF          RADIOLOGY & ADDITIONAL STUDIES REVIEWED:      CXR:  < from: Xray Chest 1 View- PORTABLE-Urgent (Xray Chest 1 View- PORTABLE-Urgent .) (20 @ 15:03) >    EXAM:  XR CHEST PORTABLE URGENT 1V                            PROCEDURE DATE:  2020          INTERPRETATION:  History: Central line placement    Chest:  one view.    Comparison: 2020    AP radiograph of the chest demonstrates placement of LEFT internal jugular venous catheter with tip in SVC. Persistent BILATERAL interstitial infiltrates in the LEFT lung and RIGHT lower lobe. ET tube in satisfactory position. The cardiac silhouette is normal in size. Osseous structures are intact.    Impression:placement of LEFT internal jugular venous catheter with tip in SVC. Persistent BILATERAL interstitial infiltrates in the LEFT lung and RIGHT lower lobe. ET tube is in satisfactory position.            ALEXANDRA DEVRIES MD; Attending Radiologist  This document has been electronically signed. 2020  3:44PM    < end of copied text >  GOALS OF CARE DISCUSSION WITH PATIENT/FAMILY/PROXY:    CRITICAL CARE TIME SPENT: 35 minutes

## 2020-11-13 NOTE — PROGRESS NOTE ADULT - ASSESSMENT
ASSESSMENT AND PLAN:  Pt is 86 yo male with PMHx of CAD with stent, Afib on Eliquis, HTN, HLD, Gout who presented with weakness and shortness of breath. Pt is being admitted to ICU for hypoxic respiratory failure requiring BIPAP support.    1.Hypoxic Resp failure due to pneumonia and fluid overload  2.CKD  3.Acute encephalopathy  4.Afib on Eliquis  5.HTN  6.BPH  7.Anemia    Neuro  -Acute Encephalopathy vs worsening dementia  CT head on admission negative for any acute stroke  BUN elevated to 51  Monitor mental status closely    Cardiovascular  -Afib on Eliquis   F/u repeat Hb and continue with eliquis if stable  Pt not on any Bb at home    CAD  Pt on eliquis at home  Pt with recent echo done on 10/22 which showed normal EF, mild-mod MR and mild tricuspid regurgitation  EKG with Afib, no acute ischemic changes noted    Acute Pulmonary edema  Likely secondary to fluid overload from worsening CKD  Pt s/p 2L ivf in ED  Will give lasix 80mg and monitor urine output  Pt unable to pass urine  Urology consulted    Pulmonary  -Acute hypoxic respiratory failure s/s to Pneumonia and fluid overload requiring BIPAP support  Will start on broad spectrum abx, f/u blood cultures, urine cultures, sputum cultures, legionella and strep antigen  Will continue with diuresis     Infections  -Pneumonia  Will continue with broad spectrum abx  F/u blood cultures  Covid negative  F/u RVP and repeat covid in 24 hours    Nephro  -Progressive CKD  Pt with baseline Cr of around 2.7 05/20 with prior h/o post infectious GN  Pt Cr 5 on last admission  Will start on lasix 80mg and monitor urine output  Strict I/O monitoring  Nephro eval      Gastrointestinal  -Npo for now  No acute issues at present    Heme  -Hb 7.8, baseline around 8.7 as per documentation  Monitor H/H  Will hold eliquis for now, WIll restart in am if H&H remains stable  Endocrine  -    Skin/Catheters  -No acute issues    Prophylaxis   -c/w eliquis for dvt ppx  -pantoprazole for GI ppx ASSESSMENT AND PLAN:  Pt is 84 yo male with PMHx of CAD with stent, Afib on Eliquis, HTN, HLD, Gout who presented with weakness and shortness of breath. Pt is being admitted to ICU for hypoxic respiratory failure requiring BIPAP support.    1.Hypoxic Resp failure due to pneumonia and fluid overload  2.CKD  3.Acute encephalopathy  4.Afib on Eliquis  5.HTN  6.BPH  7.Anemia    Neuro  Patient intubated thus morning ( 11/13/2020)  sedated on propofol and fentanyl.    -Acute Encephalopathy vs worsening dementia  CT head on admission negative for any acute stroke  BUN elevated to 51  Monitor mental status closely    Cardiovascular:  Patient off anticoagulation for catheter placement,  will restart anticoagulation once catheter is in.,  Patient on 2 vasopressors to maintain MAP of > 65    CAD  Pt on eliquis at home  Pt with recent echo done on 10/22 which showed normal EF, mild-mod MR and mild tricuspid regurgitation  EKG with Afib, no acute ischemic changes noted    Acute Pulmonary edema  Likely secondary to fluid overload from worsening CKD  Pt s/p 2L ivf in ED  Patient was started on lasix 80 bid.  Urology consulted    Pulmonary  Patient intubated on 11/13/2020 for hypoxia   -Acute hypoxic respiratory failure s/s to Pneumonia and fluid overload       Infections  -Pneumonia  Will continue with broad spectrum abx  F/u blood cultures  Covid negative  F/u RVP and repeat covid in 24 hours    Nephro  -Progressive CKD  Pt with baseline Cr of around 2.7 05/20 with prior h/o post infectious GN  Pt Cr 5 on last admission  Patient was started  on lasix 80mg with minimal urine out pu.  Strict I/O monitoring  Dr Caputo on board.  patient has severe metabolic acidosis s/p 2 amps of bicarb.       Gastrointestinal  -Npo for now  No acute issues at present    Heme  -Hb 7.8, baseline around 8.7 as per documentation  Monitor H/H  Will hold eliquis for now, WIll restart in aon heparin drip after cather placement.-    Skin/Catheters  -No acute issues    Prophylaxis   -will start heparin drip.  -pantoprazole for GI ppx

## 2020-11-13 NOTE — CONSULT NOTE ADULT - ASSESSMENT
85M PMHx of CKD, Afib on Eliquis presenting with hypoxic respiratory failure 2/2 to PNA and fluid overload. Vascular consulted for temporary hemodialysis access  INR elevated 1.9    -Plan for shiley catheter placement once patient is INR is corrected  -Remainder of care per ICU

## 2020-11-13 NOTE — PROCEDURE NOTE - NSICDXPROCEDURE_GEN_ALL_CORE_FT
PROCEDURES:  Insertion, Bui catheter, indwelling 12-Nov-2020 18:14:31  Jonathan Velasquez  
PROCEDURES:  Insertion, catheter, hemodialysis, non-tunneled, with US guidance 13-Nov-2020 19:24:06  Nina Etienne

## 2020-11-13 NOTE — PROGRESS NOTE ADULT - ASSESSMENT
Pt is a 84 yo male with PMHx of CAD with stent, Afib on Eliquis, HTN, HLD, Gout, who presented to ED for generalized weakness for the past 2 weeks, generalized deteriorating progressively, and worsening cough. Son also reports that the patient's mental status has been slowly deteriorating. Admitted with fluid overload.    A/P:  Renal failure:  Unclear baseline  Acute Vs CKD  Etiology?  Has HTN, Proteinuria-Glomerulonephritis can not be ruled out  Will get more info from his outpatient Nephrologist  Monitor renal function at present    Fluid Overload:  SOB possible sec to fluid overload  Follow up CT chest to rule out other etiology  Getting lasix drip 10mg/hr  Monitor I/O  Daily weight  D/W his son got consent for HD  HD today as his urine output is still low       Proteinuria:  Etiology?  Will get vasculitis work up  JAI, C3, C4, ANCA, anti-GBM, Hep C, HBsAg, SPEP, Serum immunofixation, serum free light chain, RPR, HIV, urine protein/cr ratio    Hyponatremia:  Likely sec to fluid overload  Rule out SIADH  Get Urine Na, Urine osmolality, TSH, serum cortisol    Acidosis:  Both rep and metabolic-Non-AG  Supplement NaHCO3 650mg TID   Vent management per ICU    Anemia:  Etiology?  Get iron study, B12, folate  Transfuse PRN to keep Hb >8.0  Monitor Hb

## 2020-11-14 NOTE — PROGRESS NOTE ADULT - SUBJECTIVE AND OBJECTIVE BOX
SHWETHA RANDALL  85y  Patient is a 85y old  Male who presents with a chief complaint of Shortness of breath (2020 09:50)    HPI:  Admitted with weakness. He has CKD 5 with proteinuria. Intubated yesterday, started on HD.  On pressors now.        MEDICATIONS  (STANDING):  ALBUTerol    90 MICROgram(s) HFA Inhaler 2 Puff(s) Inhalation every 6 hours  chlorhexidine 0.12% Liquid 15 milliLiter(s) Oral Mucosa every 12 hours  chlorhexidine 2% Cloths 1 Application(s) Topical <User Schedule>  fentaNYL   Infusion. 0.5 MICROgram(s)/kG/Hr (3.83 mL/Hr) IV Continuous <Continuous>  finasteride 5 milliGRAM(s) Oral daily  norepinephrine Infusion 0.05 MICROgram(s)/kG/Min (3.59 mL/Hr) IV Continuous <Continuous>  pancrelipase  (CREON  6,000 Lipase Units) 6 Capsule(s) Oral three times a day with meals  pantoprazole  Injectable 40 milliGRAM(s) IV Push daily  piperacillin/tazobactam IVPB.. 3.375 Gram(s) IV Intermittent every 12 hours  ranolazine 500 milliGRAM(s) Oral two times a day  sodium bicarbonate 650 milliGRAM(s) Oral every 8 hours  tamsulosin 0.4 milliGRAM(s) Oral at bedtime    MEDICATIONS  (PRN):    Vital Signs Last 24 Hrs  T(C): 36.7 (2020 09:00), Max: 36.7 (2020 09:00)  T(F): 98 (2020 09:00), Max: 98 (2020 09:00)  HR: 106 (2020 11:15) (66 - 132)  BP: 123/40 (2020 11:15) (69/54 - 138/56)  BP(mean): 60 (2020 11:15) (47 - 80)  RR: 26 (:15) (0 - 32)  SpO2: 100% (2020 11:15) (73% - 100%)  Daily     Daily Weight in k.7 (2020 22:50)    PHYSICAL EXAM:  Constitutional:  He appears comfortable and not distressed. Not diaphoretic.    Neck:  The thyroid is normal. Trachea is midline.     Breasts: Normal examination.    Respiratory: The lungs are clear to auscultation. No dullness and expansion is normal.    Cardiovascular: S1 and S2 are normal. No mummurs, rubs or gallops are present.    Gastrointestinal: The abdomen is soft. No tenderness is present. No masses are present. Bowel sounds are normal.    Genitourinary: The bladder is not distended. No CVA tenderness is present.    Extremities: No edema is noted. No deformities are present.    Neurological: Sedated. Tone, power and sensation are normal.     Skin: No lesions are seen  or palpated.                          7.1    10.71 )-----------( 206      ( 2020 11:27 )             22.3     14    135  |  102  |  28<H>  ----------------------------<  135<H>  4.0   |  24  |  3.27<H>    Ca    7.4<L>      2020 00:46  Phos  4.8       Mg     2.1         TPro  5.5<L>  /  Alb  1.5<L>  /  TBili  0.4  /  DBili  x   /  AST  56<H>  /  ALT  23  /  AlkPhos  71  11-13    Urinalysis Basic - ( 2020 18:04 )    Color: Yellow / Appearance: Clear / S.025 / pH: x  Gluc: x / Ketone: Negative  / Bili: Negative / Urobili: Negative   Blood: x / Protein: 100 / Nitrite: Negative   Leuk Esterase: Trace / RBC: 5-10 /HPF / WBC 3-5 /HPF   Sq Epi: x / Non Sq Epi: Few /HPF / Bacteria: Few /HPF    Total Protein, Random Urine (11.13.20 @ 13:49)   Total Protein, Random Urine: 218 mg/dL Creatinine, Random Urine: 88: Reference Ranges have NOT been established for random urine analytes due   to variability in fluid intake and concentration. mg/dL (11.13.20 @ 13:49) Urinalysis (.12.20 @ 18:04)   pH Urine: 5.0   Glucose Qualitative, Urine: Negative   Blood, Urine: Moderate   Color: Yellow   Urine Appearance: Clear   Bilirubin: Negative   Ketone - Urine: Negative   Specific Gravity: 1.025   Protein, Urine: 100   Urobilinogen: Negative   Nitrite: Negative   Leukocyte Esterase Concentration: Trace

## 2020-11-14 NOTE — PROGRESS NOTE ADULT - ASSESSMENT
ASSESSMENT AND PLAN:  Pt is 84 yo male with PMHx of CAD with stent, Afib on Eliquis, HTN, HLD, Gout who presented with weakness and shortness of breath. Pt is being admitted to ICU for hypoxic respiratory failure requiring BIPAP support.    1.Hypoxic Resp failure due to pneumonia and fluid overload  2.CKD  3.Acute encephalopathy  4.Afib on Eliquis  5.HTN  6.BPH  7.Anemia    Neuro  Patient intubated thus morning ( 11/13/2020)  sedated on propofol and fentanyl.    -Acute Encephalopathy vs worsening dementia  CT head on admission negative for any acute stroke  BUN elevated to 51  Monitor mental status closely    Cardiovascular:  Patient off anticoagulation for catheter placement,  will restart anticoagulation once catheter is in.,  Patient on 2 vasopressors to maintain MAP of > 65    CAD  Pt on eliquis at home  Pt with recent echo done on 10/22 which showed normal EF, mild-mod MR and mild tricuspid regurgitation  EKG with Afib, no acute ischemic changes noted    Acute Pulmonary edema  Likely secondary to fluid overload from worsening CKD  Pt s/p 2L ivf in ED  Patient was started on lasix 80 bid.  Urology consulted    Pulmonary  Patient intubated on 11/13/2020 for hypoxia   -Acute hypoxic respiratory failure s/s to Pneumonia and fluid overload       Infections  -Pneumonia  Will continue with broad spectrum abx  F/u blood cultures  Covid negative  F/u RVP and repeat covid in 24 hours    Nephro  -Progressive CKD  Pt with baseline Cr of around 2.7 05/20 with prior h/o post infectious GN  Pt Cr 5 on last admission  Patient was started  on lasix 80mg with minimal urine out pu.  Strict I/O monitoring  Dr Caputo on board.  patient has severe metabolic acidosis s/p 2 amps of bicarb.       Gastrointestinal  -Npo for now  No acute issues at present    Heme  -Hb 7.8, baseline around 8.7 as per documentation  Monitor H/H  Will hold eliquis for now, WIll restart in aon heparin drip after cather placement.-    Skin/Catheters  -No acute issues    Prophylaxis   -will start heparin drip.  -pantoprazole for GI ppx ASSESSMENT AND PLAN:  Pt is 84 yo male with PMHx of CAD with stent, Afib on Eliquis, HTN, HLD, Gout who presented with weakness and shortness of breath. Pt is being admitted to ICU for hypoxic respiratory failure requiring BIPAP support.    1.Hypoxic Resp failure due to pneumonia and fluid overload  2.CKD  3.Acute encephalopathy  4.Afib on Eliquis  5.HTN  6.BPH  7.Anemia    Neuro  Patient intubated thus morning ( 11/13/2020)  sedated on propofol and fentanyl.    -Acute Encephalopathy vs worsening dementia  CT head on admission negative for any acute stroke  BUN elevated to 51  Monitor mental status closely    Cardiovascular:  Patient off anticoagulation for catheter placement,  will restart anticoagulation once catheter is in.,  Patient on 2 vasopressors to maintain MAP of > 65  levophed and vasopressin    CAD  Pt on eliquis at home  Pt with recent echo done on 10/22 which showed normal EF, mild-mod MR and mild tricuspid regurgitation  EKG with Afib, no acute ischemic changes noted    Acute Pulmonary edema  Likely secondary to fluid overload from worsening CKD  Pt s/p 2L ivf in ED  Patient was started on lasix 80 bid.  Urology consulted    Pulmonary  Patient intubated on 11/13/2020 for hypoxia   -Acute hypoxic respiratory failure s/s to Pneumonia and fluid overload       Infections  -Pneumonia  Will continue with broad spectrum abx  F/u blood cultures  Covid negative  F/u RVP and repeat covid in 24 hours    Nephro  -Progressive CKD  Pt with baseline Cr of around 2.7 05/20 with prior h/o post infectious GN  Pt Cr 5 on last admission  Patient was started  on lasix 80mg with minimal urine out pu.  Strict I/O monitoring  Dr Caputo on board.  patient has severe metabolic acidosis s/p 2 amps of bicarb.       Gastrointestinal  -Npo for now  No acute issues at present    Heme  -Hb 7.8, baseline around 8.7 as per documentation  Monitor H/H  Will hold eliquis for now,   Pt H/H dropping , Hgb >6.9 this am >1 PRBC >7.1 > f/u after dialysis    Skin/Catheters  -No acute issues    Prophylaxis   -will start heparin drip. but h/h unstable   -pantoprazole for GI ppx

## 2020-11-14 NOTE — DIETITIAN INITIAL EVALUATION ADULT. - PERTINENT LABORATORY DATA
11-14 Na135 mmol/L Glu 135 mg/dL<H> K+ 4.0 mmol/L Cr  3.27 mg/dL<H> BUN 28 mg/dL<H>   11-14 Phos 4.8 mg/dL<H>   11-13 Alb 1.5 g/dL<L>       11-13 Chol 172 mg/dL LDL --    HDL 46 mg/dL Trig 83 mg/dL  11-13-20 @ 09:31 HgbA1C 5.0 [4.0 - 5.6]

## 2020-11-14 NOTE — DIETITIAN INITIAL EVALUATION ADULT. - FACTORS AFF FOOD INTAKE
acute on chronic comorbidities including Renal Failure, fluid overload, vent support/difficulty swallowing/change in mental status

## 2020-11-14 NOTE — PROGRESS NOTE ADULT - SUBJECTIVE AND OBJECTIVE BOX
INTERVAL HPI/OVERNIGHT EVENTS: ***    PRESSORS: [ ] YES [ ] NO  WHICH:    ANTIBIOTICS:                      Antimicrobial:  piperacillin/tazobactam IVPB.. 3.375 Gram(s) IV Intermittent every 12 hours    Cardiovascular:  buMETAnide Infusion 1 mG/Hr IV Continuous <Continuous>  norepinephrine Infusion 0.05 MICROgram(s)/kG/Min IV Continuous <Continuous>  ranolazine 500 milliGRAM(s) Oral two times a day  tamsulosin 0.4 milliGRAM(s) Oral at bedtime    Pulmonary:  ALBUTerol    90 MICROgram(s) HFA Inhaler 2 Puff(s) Inhalation every 6 hours    Hematalogic:    Other:  chlorhexidine 0.12% Liquid 15 milliLiter(s) Oral Mucosa every 12 hours  chlorhexidine 2% Cloths 1 Application(s) Topical <User Schedule>  fentaNYL   Infusion. 0.5 MICROgram(s)/kG/Hr IV Continuous <Continuous>  finasteride 5 milliGRAM(s) Oral daily  pancrelipase  (CREON  6,000 Lipase Units) 6 Capsule(s) Oral three times a day with meals  pantoprazole  Injectable 40 milliGRAM(s) IV Push daily  sodium bicarbonate 650 milliGRAM(s) Oral every 8 hours    ALBUTerol    90 MICROgram(s) HFA Inhaler 2 Puff(s) Inhalation every 6 hours  buMETAnide Infusion 1 mG/Hr IV Continuous <Continuous>  chlorhexidine 0.12% Liquid 15 milliLiter(s) Oral Mucosa every 12 hours  chlorhexidine 2% Cloths 1 Application(s) Topical <User Schedule>  fentaNYL   Infusion. 0.5 MICROgram(s)/kG/Hr IV Continuous <Continuous>  finasteride 5 milliGRAM(s) Oral daily  norepinephrine Infusion 0.05 MICROgram(s)/kG/Min IV Continuous <Continuous>  pancrelipase  (CREON  6,000 Lipase Units) 6 Capsule(s) Oral three times a day with meals  pantoprazole  Injectable 40 milliGRAM(s) IV Push daily  piperacillin/tazobactam IVPB.. 3.375 Gram(s) IV Intermittent every 12 hours  ranolazine 500 milliGRAM(s) Oral two times a day  sodium bicarbonate 650 milliGRAM(s) Oral every 8 hours  tamsulosin 0.4 milliGRAM(s) Oral at bedtime    Drug Dosing Weight  Height (cm): 177.8 (2020 08:58)  Weight (kg): 76.5 (2020 08:58)  BMI (kg/m2): 24.2 (2020 08:58)  BSA (m2): 1.94 (2020 08:58)    CENTRAL LINE: [ ] YES [ ] NO  LOCATION:   DATE INSERTED:  REMOVE: [ ] YES [ ] NO  EXPLAIN:    ESCOBAR: [ ] YES [ ] NO    DATE INSERTED:  REMOVE:  [ ] YES [ ] NO  EXPLAIN:    A-LINE:  [ ] YES [ ] NO  LOCATION:   DATE INSERTED:  REMOVE:  [ ] YES [ ] NO  EXPLAIN:    PMH -reviewed admission note, no change since admission    ICU Vital Signs Last 24 Hrs  T(C): 35.6 (2020 22:50), Max: 37.5 (2020 10:30)  T(F): 96 (2020 22:50), Max: 99.5 (2020 10:30)  HR: 90 (2020 00:00) (66 - 121)  BP: 122/48 (2020 23:15) (56/35 - 172/139)  BP(mean): 68 (2020 23:15) (39 - 147)  ABP: --  ABP(mean): --  RR: 15 (2020 23:15) (12 - 38)  SpO2: 95% (2020 00:00) (50% - 100%)      ABG - ( 2020 11:29 )  pH, Arterial: 7.11  pH, Blood: x     /  pCO2: 50    /  pO2: 57    / HCO3: 15    / Base Excess: -12.9 /  SaO2: 77                     @ 07:01  -   @ 07:00  --------------------------------------------------------  IN: 100 mL / OUT: 165 mL / NET: -65 mL        Mode: AC/ CMV (Assist Control/ Continuous Mandatory Ventilation)  RR (machine): 22  TV (machine): 450  FiO2: 100  PEEP: 12  ITime: 1  MAP: 18  PIP: 31      PHYSICAL EXAM:    GENERAL: NAD, well-groomed, well-developed  HEAD:  Atraumatic, Normocephalic  EYES: EOMI, PERRLA, conjunctiva and sclera clear  ENMT: No tonsillar erythema, exudates, or enlargement; Moist mucous membranes, Good dentition, No lesions  NECK: Supple, normal appearance, No JVD; Normal thyroid; Trachea midline  NERVOUS SYSTEM:  Alert & Oriented X3, Good concentration; Motor Strength 5/5 B/L upper and lower extremities; DTRs 2+ intact and symmetric  CHEST/LUNG: No chest deformity; Normal percussion bilaterally; No rales, rhonchi, wheezing   HEART: Regular rate and rhythm; No murmurs, rubs, or gallops  ABDOMEN: Soft, Nontender, Nondistended; Bowel sounds present  EXTREMITIES:  2+ Peripheral Pulses, No clubbing, cyanosis, or edema  LYMPH: No lymphadenopathy noted  SKIN: No rashes or lesions; Good capillary refill      LABS:  CBC Full  -  ( 2020 06:39 )  WBC Count : 15.77 K/uL  RBC Count : 2.14 M/uL  Hemoglobin : 7.2 g/dL  Hematocrit : 22.0 %  Platelet Count - Automated : 261 K/uL  Mean Cell Volume : 102.8 fl  Mean Cell Hemoglobin : 33.6 pg  Mean Cell Hemoglobin Concentration : 32.7 gm/dL  Auto Neutrophil # : 14.33 K/uL  Auto Lymphocyte # : 0.50 K/uL  Auto Monocyte # : 0.76 K/uL  Auto Eosinophil # : 0.05 K/uL  Auto Basophil # : 0.03 K/uL  Auto Neutrophil % : 90.9 %  Auto Lymphocyte % : 3.2 %  Auto Monocyte % : 4.8 %  Auto Eosinophil % : 0.3 %  Auto Basophil % : 0.2 %        135  |  102  |  26<H>  ----------------------------<  125<H>  3.9   |  25  |  3.03<H>    Ca    7.6<L>      2020 23:44  Phos  6.1       Mg     2.5         TPro  5.5<L>  /  Alb  1.5<L>  /  TBili  0.4  /  DBili  x   /  AST  56<H>  /  ALT  23  /  AlkPhos  71      PT/INR - ( 2020 16:24 )   PT: 21.2 sec;   INR: 1.83 ratio         PTT - ( 2020 13:40 )  PTT:34.4 sec  Urinalysis Basic - ( 2020 18:04 )    Color: Yellow / Appearance: Clear / S.025 / pH: x  Gluc: x / Ketone: Negative  / Bili: Negative / Urobili: Negative   Blood: x / Protein: 100 / Nitrite: Negative   Leuk Esterase: Trace / RBC: 5-10 /HPF / WBC 3-5 /HPF   Sq Epi: x / Non Sq Epi: Few /HPF / Bacteria: Few /HPF      Culture Results:   No growth ( @ 21:54)  Culture Results:   No growth to date. ( @ 14:21)  Culture Results:   No growth to date. ( @ 14:21)      RADIOLOGY & ADDITIONAL STUDIES REVIEWED:  ***    GOALS OF CARE DISCUSSION WITH PATIENT/FAMILY/PROXY:    CRITICAL CARE TIME SPENT: 35 minutes INTERVAL HPI/OVERNIGHT EVENTS:   Pt received 2 hr dialysis. Post dialysis labs show drop in Hb. Transfuse 1u PRBC. As per [facility], no retroperitoneal hematoma visualized on CT abdomen. CT chest shows multifocal b/l pneumonia. Pt opens eyes spontaneously while in CT scan.    PRESSORS: [ ] YES [ ] NO  WHICH:    ANTIBIOTICS:                      Antimicrobial:  piperacillin/tazobactam IVPB.. 3.375 Gram(s) IV Intermittent every 12 hours    Cardiovascular:  buMETAnide Infusion 1 mG/Hr IV Continuous <Continuous>  norepinephrine Infusion 0.05 MICROgram(s)/kG/Min IV Continuous <Continuous>  ranolazine 500 milliGRAM(s) Oral two times a day  tamsulosin 0.4 milliGRAM(s) Oral at bedtime    Pulmonary:  ALBUTerol    90 MICROgram(s) HFA Inhaler 2 Puff(s) Inhalation every 6 hours    Hematalogic:    Other:  chlorhexidine 0.12% Liquid 15 milliLiter(s) Oral Mucosa every 12 hours  chlorhexidine 2% Cloths 1 Application(s) Topical <User Schedule>  fentaNYL   Infusion. 0.5 MICROgram(s)/kG/Hr IV Continuous <Continuous>  finasteride 5 milliGRAM(s) Oral daily  pancrelipase  (CREON  6,000 Lipase Units) 6 Capsule(s) Oral three times a day with meals  pantoprazole  Injectable 40 milliGRAM(s) IV Push daily  sodium bicarbonate 650 milliGRAM(s) Oral every 8 hours    ALBUTerol    90 MICROgram(s) HFA Inhaler 2 Puff(s) Inhalation every 6 hours  buMETAnide Infusion 1 mG/Hr IV Continuous <Continuous>  chlorhexidine 0.12% Liquid 15 milliLiter(s) Oral Mucosa every 12 hours  chlorhexidine 2% Cloths 1 Application(s) Topical <User Schedule>  fentaNYL   Infusion. 0.5 MICROgram(s)/kG/Hr IV Continuous <Continuous>  finasteride 5 milliGRAM(s) Oral daily  norepinephrine Infusion 0.05 MICROgram(s)/kG/Min IV Continuous <Continuous>  pancrelipase  (CREON  6,000 Lipase Units) 6 Capsule(s) Oral three times a day with meals  pantoprazole  Injectable 40 milliGRAM(s) IV Push daily  piperacillin/tazobactam IVPB.. 3.375 Gram(s) IV Intermittent every 12 hours  ranolazine 500 milliGRAM(s) Oral two times a day  sodium bicarbonate 650 milliGRAM(s) Oral every 8 hours  tamsulosin 0.4 milliGRAM(s) Oral at bedtime    Drug Dosing Weight  Height (cm): 177.8 (2020 08:58)  Weight (kg): 76.5 (2020 08:58)  BMI (kg/m2): 24.2 (2020 08:58)  BSA (m2): 1.94 (2020 08:58)    CENTRAL LINE: [ ] YES [ ] NO  LOCATION:   DATE INSERTED:  REMOVE: [ ] YES [ ] NO  EXPLAIN:    ESCOBAR: [ ] YES [ ] NO    DATE INSERTED:  REMOVE:  [ ] YES [ ] NO  EXPLAIN:    A-LINE:  [ ] YES [ ] NO  LOCATION:   DATE INSERTED:  REMOVE:  [ ] YES [ ] NO  EXPLAIN:    PMH -reviewed admission note, no change since admission    ICU Vital Signs Last 24 Hrs  T(C): 35.6 (2020 22:50), Max: 37.5 (2020 10:30)  T(F): 96 (2020 22:50), Max: 99.5 (2020 10:30)  HR: 90 (2020 00:00) (66 - 121)  BP: 122/48 (2020 23:15) (56/35 - 172/139)  BP(mean): 68 (2020 23:15) (39 - 147)  ABP: --  ABP(mean): --  RR: 15 (2020 23:15) (12 - 38)  SpO2: 95% (2020 00:00) (50% - 100%)      ABG - ( 2020 11:29 )  pH, Arterial: 7.11  pH, Blood: x     /  pCO2: 50    /  pO2: 57    / HCO3: 15    / Base Excess: -12.9 /  SaO2: 77                     @ 07:01  -   @ 07:00  --------------------------------------------------------  IN: 100 mL / OUT: 165 mL / NET: -65 mL        Mode: AC/ CMV (Assist Control/ Continuous Mandatory Ventilation)  RR (machine): 22  TV (machine): 450  FiO2: 100  PEEP: 12  ITime: 1  MAP: 18  PIP: 31      PHYSICAL EXAM:    GENERAL: NAD, well-groomed, well-developed  HEAD:  Atraumatic, Normocephalic  EYES: EOMI, PERRLA, conjunctiva and sclera clear  ENMT: No tonsillar erythema, exudates, or enlargement; Moist mucous membranes, Good dentition, No lesions  NECK: Supple, normal appearance, No JVD; Normal thyroid; Trachea midline  NERVOUS SYSTEM:  Alert & Oriented X3, Good concentration; Motor Strength 5/5 B/L upper and lower extremities; DTRs 2+ intact and symmetric  CHEST/LUNG: No chest deformity; Normal percussion bilaterally; No rales, rhonchi, wheezing   HEART: Regular rate and rhythm; No murmurs, rubs, or gallops  ABDOMEN: Soft, Nontender, Nondistended; Bowel sounds present  EXTREMITIES:  2+ Peripheral Pulses, No clubbing, cyanosis, or edema  LYMPH: No lymphadenopathy noted  SKIN: No rashes or lesions; Good capillary refill      LABS:  CBC Full  -  ( 2020 06:39 )  WBC Count : 15.77 K/uL  RBC Count : 2.14 M/uL  Hemoglobin : 7.2 g/dL  Hematocrit : 22.0 %  Platelet Count - Automated : 261 K/uL  Mean Cell Volume : 102.8 fl  Mean Cell Hemoglobin : 33.6 pg  Mean Cell Hemoglobin Concentration : 32.7 gm/dL  Auto Neutrophil # : 14.33 K/uL  Auto Lymphocyte # : 0.50 K/uL  Auto Monocyte # : 0.76 K/uL  Auto Eosinophil # : 0.05 K/uL  Auto Basophil # : 0.03 K/uL  Auto Neutrophil % : 90.9 %  Auto Lymphocyte % : 3.2 %  Auto Monocyte % : 4.8 %  Auto Eosinophil % : 0.3 %  Auto Basophil % : 0.2 %        135  |  102  |  26<H>  ----------------------------<  125<H>  3.9   |  25  |  3.03<H>    Ca    7.6<L>      2020 23:44  Phos  6.1     11-13  Mg     2.5     11-13    TPro  5.5<L>  /  Alb  1.5<L>  /  TBili  0.4  /  DBili  x   /  AST  56<H>  /  ALT  23  /  AlkPhos  71  11-13    PT/INR - ( 2020 16:24 )   PT: 21.2 sec;   INR: 1.83 ratio         PTT - ( 2020 13:40 )  PTT:34.4 sec  Urinalysis Basic - ( 2020 18:04 )    Color: Yellow / Appearance: Clear / S.025 / pH: x  Gluc: x / Ketone: Negative  / Bili: Negative / Urobili: Negative   Blood: x / Protein: 100 / Nitrite: Negative   Leuk Esterase: Trace / RBC: 5-10 /HPF / WBC 3-5 /HPF   Sq Epi: x / Non Sq Epi: Few /HPF / Bacteria: Few /HPF      Culture Results:   No growth ( @ 21:54)  Culture Results:   No growth to date. ( @ 14:21)  Culture Results:   No growth to date. ( @ 14:21)      RADIOLOGY & ADDITIONAL STUDIES REVIEWED:  ***    GOALS OF CARE DISCUSSION WITH PATIENT/FAMILY/PROXY:    CRITICAL CARE TIME SPENT: 35 minutes INTERVAL HPI/OVERNIGHT EVENTS:   Pt received 2 hr dialysis. Post dialysis labs show drop in Hb. Transfuse 1u PRBC. As per [facility], no retroperitoneal hematoma visualized on CT abdomen. CT chest shows multifocal b/l pneumonia. Pt opens eyes spontaneously while in CT scan.    PRESSORS: [ ] YES [ ] NO  WHICH:    ANTIBIOTICS:                      Antimicrobial:  piperacillin/tazobactam IVPB.. 3.375 Gram(s) IV Intermittent every 12 hours    Cardiovascular:  buMETAnide Infusion 1 mG/Hr IV Continuous <Continuous>  norepinephrine Infusion 0.05 MICROgram(s)/kG/Min IV Continuous <Continuous>  ranolazine 500 milliGRAM(s) Oral two times a day  tamsulosin 0.4 milliGRAM(s) Oral at bedtime    Pulmonary:  ALBUTerol    90 MICROgram(s) HFA Inhaler 2 Puff(s) Inhalation every 6 hours    Hematalogic:    Other:  chlorhexidine 0.12% Liquid 15 milliLiter(s) Oral Mucosa every 12 hours  chlorhexidine 2% Cloths 1 Application(s) Topical <User Schedule>  fentaNYL   Infusion. 0.5 MICROgram(s)/kG/Hr IV Continuous <Continuous>  finasteride 5 milliGRAM(s) Oral daily  pancrelipase  (CREON  6,000 Lipase Units) 6 Capsule(s) Oral three times a day with meals  pantoprazole  Injectable 40 milliGRAM(s) IV Push daily  sodium bicarbonate 650 milliGRAM(s) Oral every 8 hours    ALBUTerol    90 MICROgram(s) HFA Inhaler 2 Puff(s) Inhalation every 6 hours  buMETAnide Infusion 1 mG/Hr IV Continuous <Continuous>  chlorhexidine 0.12% Liquid 15 milliLiter(s) Oral Mucosa every 12 hours  chlorhexidine 2% Cloths 1 Application(s) Topical <User Schedule>  fentaNYL   Infusion. 0.5 MICROgram(s)/kG/Hr IV Continuous <Continuous>  finasteride 5 milliGRAM(s) Oral daily  norepinephrine Infusion 0.05 MICROgram(s)/kG/Min IV Continuous <Continuous>  pancrelipase  (CREON  6,000 Lipase Units) 6 Capsule(s) Oral three times a day with meals  pantoprazole  Injectable 40 milliGRAM(s) IV Push daily  piperacillin/tazobactam IVPB.. 3.375 Gram(s) IV Intermittent every 12 hours  ranolazine 500 milliGRAM(s) Oral two times a day  sodium bicarbonate 650 milliGRAM(s) Oral every 8 hours  tamsulosin 0.4 milliGRAM(s) Oral at bedtime    Drug Dosing Weight  Height (cm): 177.8 (2020 08:58)  Weight (kg): 76.5 (2020 08:58)  BMI (kg/m2): 24.2 (2020 08:58)  BSA (m2): 1.94 (2020 08:58)    CENTRAL LINE: [ ] YES [ ] NO  LOCATION:   DATE INSERTED:  REMOVE: [ ] YES [ ] NO  EXPLAIN:    ESCOBAR: [ ] YES [ ] NO    DATE INSERTED:  REMOVE:  [ ] YES [ ] NO  EXPLAIN:    A-LINE:  [ ] YES [ ] NO  LOCATION:   DATE INSERTED:  REMOVE:  [ ] YES [ ] NO  EXPLAIN:    PMH -reviewed admission note, no change since admission    ICU Vital Signs Last 24 Hrs  T(C): 35.6 (2020 22:50), Max: 37.5 (2020 10:30)  T(F): 96 (2020 22:50), Max: 99.5 (2020 10:30)  HR: 90 (2020 00:00) (66 - 121)  BP: 122/48 (2020 23:15) (56/35 - 172/139)  BP(mean): 68 (2020 23:15) (39 - 147)  ABP: --  ABP(mean): --  RR: 15 (2020 23:15) (12 - 38)  SpO2: 95% (2020 00:00) (50% - 100%)      ABG - ( 2020 11:29 )  pH, Arterial: 7.11  pH, Blood: x     /  pCO2: 50    /  pO2: 57    / HCO3: 15    / Base Excess: -12.9 /  SaO2: 77            CT chest:< from: CT Chest No Cont (20 @ 04:12) >    EXAM:  CT ABDOMEN AND PELVIS                          EXAM:  CT CHEST                            PROCEDURE DATE:  2020          INTERPRETATION:  CT of the chest, abdomen and pelvis    Indication: [lung mass, assess treatment response    Technique: Axial images were obtained from the thoracic inlet through pubic symphysis without oral or IV contrast. Reformatted coronal and sagittal images were submitted.    Comparison: None    FINDINGS:    Evaluation of the solid abdominal organs is limited without contrast.    The visualized neck, axilla and subcutaneous tissues are unremarkable.    Status post endotracheal tube placement, which terminates at the level of the clavicular heads. The tracheobronchial tree is patent centrally.  There is no mediastinal hematoma.  The great vessels are not enlarged. Coronary atherosclerosis. There is no significant mediastinal or hilar adenopathy.    The heart is moderately enlarged.  There is no pericardial effusion.    Lungs: Fibrotic scarring at thelung apices. Patchy airspace, groundglass, and interstitial opacities throughout the lungs, left greater than right are compatible with multifocal pneumonia. Lungs bases demonstrate dense consolidations. Scattered areas of patchy opacity, inclusive of 1.9 cm right middle lobe opacity may be followed up in 6 weeks.    Pleura: Trace left pleural effusion.  There is no free air or focal collection.  Small amount of free fluid.    Liver: Normal.  Spleen: Normal.  Gallbladder: Unremarkable.  Biliary tree: Unremarkable. Is unchanged  Adrenal glands: Normal.  Pancreas: Normal.  Kidneys: Small bilateral renal cysts. No hydronephrosis or obstructing stone.    Bowel:  There is no small bowel obstruction.  Oral contrast is seen as distally as the rectum. The appendix is unremarkable. No significant fecal load. Sigmoid diverticulosis.    Bladder: Decompressed by Escobar catheter.  Pelvic organs: The prostate is not enlarged.    There is no significant adenopathy.  Vasculature: The aorta is not dilated.Moderate atherosclerotic vascular calcification.  Left groin femoral vein approach central venous catheter with tip terminating in the common iliac vein.    Retroperitoneum: There is no mass.  Bones: Unremarkable.  Subcutaneous tissues: Diffuse subcutaneous edema.    IMPRESSION:    Multifocal pneumonia. Small left pleural effusion. Recommend follow-up to resolution.  Cardiomegaly.    No retroperitoneal hematoma.  Subcutaneous edema.            HERLINDA GARCIA MD; Attending Radiologist  This document has been electronically signed. 2020  4:45AM    < end of copied text >         @ 07:01  -   @ 07:00  --------------------------------------------------------  IN: 100 mL / OUT: 165 mL / NET: -65 mL        Mode: AC/ CMV (Assist Control/ Continuous Mandatory Ventilation)  RR (machine): 22  TV (machine): 450  FiO2: 100  PEEP: 12  ITime: 1  MAP: 18  PIP: 31      PHYSICAL EXAM:    GENERAL: NAD, well-groomed, well-developed  HEAD:  Atraumatic, Normocephalic  EYES: EOMI, PERRLA, conjunctiva and sclera clear  ENMT: No tonsillar erythema, exudates, or enlargement; Moist mucous membranes, Good dentition, No lesions  NECK: Supple, normal appearance, No JVD; Normal thyroid; Trachea midline  NERVOUS SYSTEM:  Alert & Oriented X3, Good concentration; Motor Strength 5/5 B/L upper and lower extremities; DTRs 2+ intact and symmetric  CHEST/LUNG: No chest deformity; Normal percussion bilaterally; No rales, rhonchi, wheezing   HEART: Regular rate and rhythm; No murmurs, rubs, or gallops  ABDOMEN: Soft, Nontender, Nondistended; Bowel sounds present  EXTREMITIES:  2+ Peripheral Pulses, No clubbing, cyanosis, or edema  LYMPH: No lymphadenopathy noted  SKIN: No rashes or lesions; Good capillary refill      LABS:  CBC Full  -  ( 2020 06:39 )  WBC Count : 15.77 K/uL  RBC Count : 2.14 M/uL  Hemoglobin : 7.2 g/dL  Hematocrit : 22.0 %  Platelet Count - Automated : 261 K/uL  Mean Cell Volume : 102.8 fl  Mean Cell Hemoglobin : 33.6 pg  Mean Cell Hemoglobin Concentration : 32.7 gm/dL  Auto Neutrophil # : 14.33 K/uL  Auto Lymphocyte # : 0.50 K/uL  Auto Monocyte # : 0.76 K/uL  Auto Eosinophil # : 0.05 K/uL  Auto Basophil # : 0.03 K/uL  Auto Neutrophil % : 90.9 %  Auto Lymphocyte % : 3.2 %  Auto Monocyte % : 4.8 %  Auto Eosinophil % : 0.3 %  Auto Basophil % : 0.2 %    -    135  |  102  |  26<H>  ----------------------------<  125<H>  3.9   |  25  |  3.03<H>    Ca    7.6<L>      2020 23:44  Phos  6.1     11-13  Mg     2.5     11-13    TPro  5.5<L>  /  Alb  1.5<L>  /  TBili  0.4  /  DBili  x   /  AST  56<H>  /  ALT  23  /  AlkPhos  71  11-13    PT/INR - ( 2020 16:24 )   PT: 21.2 sec;   INR: 1.83 ratio         PTT - ( 2020 13:40 )  PTT:34.4 sec  Urinalysis Basic - ( 2020 18:04 )    Color: Yellow / Appearance: Clear / S.025 / pH: x  Gluc: x / Ketone: Negative  / Bili: Negative / Urobili: Negative   Blood: x / Protein: 100 / Nitrite: Negative   Leuk Esterase: Trace / RBC: 5-10 /HPF / WBC 3-5 /HPF   Sq Epi: x / Non Sq Epi: Few /HPF / Bacteria: Few /HPF      Culture Results:   No growth ( @ 21:54)  Culture Results:   No growth to date. ( @ 14:21)  Culture Results:   No growth to date. ( @ 14:21)      RADIOLOGY & ADDITIONAL STUDIES REVIEWED:  ***    GOALS OF CARE DISCUSSION WITH PATIENT/FAMILY/PROXY:    CRITICAL CARE TIME SPENT: 35 minutes INTERVAL HPI/OVERNIGHT EVENTS:   Pt received 2 hr dialysis. Post dialysis labs show drop in Hb. Transfuse 1u PRBC. As per [facility], no retroperitoneal hematoma visualized on CT abdomen. CT chest shows multifocal b/l pneumonia. Pt opens eyes spontaneously while in CT scan.   This am hgb dropped to 6.9 . 1 PRBC >7.1> 1 PRBC > send post dialysis lab     PRESSORS: [ ] YES [ ] NO  WHICH:    ANTIBIOTICS:                      Antimicrobial:  piperacillin/tazobactam IVPB.. 3.375 Gram(s) IV Intermittent every 12 hours    Cardiovascular:  buMETAnide Infusion 1 mG/Hr IV Continuous <Continuous>  norepinephrine Infusion 0.05 MICROgram(s)/kG/Min IV Continuous <Continuous>  ranolazine 500 milliGRAM(s) Oral two times a day  tamsulosin 0.4 milliGRAM(s) Oral at bedtime    Pulmonary:  ALBUTerol    90 MICROgram(s) HFA Inhaler 2 Puff(s) Inhalation every 6 hours    Hematalogic:    Other:  chlorhexidine 0.12% Liquid 15 milliLiter(s) Oral Mucosa every 12 hours  chlorhexidine 2% Cloths 1 Application(s) Topical <User Schedule>  fentaNYL   Infusion. 0.5 MICROgram(s)/kG/Hr IV Continuous <Continuous>  finasteride 5 milliGRAM(s) Oral daily  pancrelipase  (CREON  6,000 Lipase Units) 6 Capsule(s) Oral three times a day with meals  pantoprazole  Injectable 40 milliGRAM(s) IV Push daily  sodium bicarbonate 650 milliGRAM(s) Oral every 8 hours    ALBUTerol    90 MICROgram(s) HFA Inhaler 2 Puff(s) Inhalation every 6 hours  buMETAnide Infusion 1 mG/Hr IV Continuous <Continuous>  chlorhexidine 0.12% Liquid 15 milliLiter(s) Oral Mucosa every 12 hours  chlorhexidine 2% Cloths 1 Application(s) Topical <User Schedule>  fentaNYL   Infusion. 0.5 MICROgram(s)/kG/Hr IV Continuous <Continuous>  finasteride 5 milliGRAM(s) Oral daily  norepinephrine Infusion 0.05 MICROgram(s)/kG/Min IV Continuous <Continuous>  pancrelipase  (CREON  6,000 Lipase Units) 6 Capsule(s) Oral three times a day with meals  pantoprazole  Injectable 40 milliGRAM(s) IV Push daily  piperacillin/tazobactam IVPB.. 3.375 Gram(s) IV Intermittent every 12 hours  ranolazine 500 milliGRAM(s) Oral two times a day  sodium bicarbonate 650 milliGRAM(s) Oral every 8 hours  tamsulosin 0.4 milliGRAM(s) Oral at bedtime    Drug Dosing Weight  Height (cm): 177.8 (2020 08:58)  Weight (kg): 76.5 (2020 08:58)  BMI (kg/m2): 24.2 (2020 08:58)  BSA (m2): 1.94 (2020 08:58)    CENTRAL LINE: [ ] YES [ ] NO  LOCATION:   DATE INSERTED:  REMOVE: [ ] YES [ ] NO  EXPLAIN:    ESCOBAR: [ ] YES [ ] NO    DATE INSERTED:  REMOVE:  [ ] YES [ ] NO  EXPLAIN:    A-LINE:  [ ] YES [ ] NO  LOCATION:   DATE INSERTED:  REMOVE:  [ ] YES [ ] NO  EXPLAIN:    PMH -reviewed admission note, no change since admission    ICU Vital Signs Last 24 Hrs  T(C): 35.6 (2020 22:50), Max: 37.5 (2020 10:30)  T(F): 96 (2020 22:50), Max: 99.5 (2020 10:30)  HR: 90 (2020 00:00) (66 - 121)  BP: 122/48 (2020 23:15) (56/35 - 172/139)  BP(mean): 68 (2020 23:15) (39 - 147)  ABP: --  ABP(mean): --  RR: 15 (2020 23:15) (12 - 38)  SpO2: 95% (2020 00:00) (50% - 100%)      ABG - ( 2020 11:29 )  pH, Arterial: 7.11  pH, Blood: x     /  pCO2: 50    /  pO2: 57    / HCO3: 15    / Base Excess: -12.9 /  SaO2: 77            CT chest:< from: CT Chest No Cont (20 @ 04:12) >    EXAM:  CT ABDOMEN AND PELVIS                          EXAM:  CT CHEST                            PROCEDURE DATE:  2020          INTERPRETATION:  CT of the chest, abdomen and pelvis    Indication: [lung mass, assess treatment response    Technique: Axial images were obtained from the thoracic inlet through pubic symphysis without oral or IV contrast. Reformatted coronal and sagittal images were submitted.    Comparison: None    FINDINGS:    Evaluation of the solid abdominal organs is limited without contrast.    The visualized neck, axilla and subcutaneous tissues are unremarkable.    Status post endotracheal tube placement, which terminates at the level of the clavicular heads. The tracheobronchial tree is patent centrally.  There is no mediastinal hematoma.  The great vessels are not enlarged. Coronary atherosclerosis. There is no significant mediastinal or hilar adenopathy.    The heart is moderately enlarged.  There is no pericardial effusion.    Lungs: Fibrotic scarring at thelung apices. Patchy airspace, groundglass, and interstitial opacities throughout the lungs, left greater than right are compatible with multifocal pneumonia. Lungs bases demonstrate dense consolidations. Scattered areas of patchy opacity, inclusive of 1.9 cm right middle lobe opacity may be followed up in 6 weeks.    Pleura: Trace left pleural effusion.  There is no free air or focal collection.  Small amount of free fluid.    Liver: Normal.  Spleen: Normal.  Gallbladder: Unremarkable.  Biliary tree: Unremarkable. Is unchanged  Adrenal glands: Normal.  Pancreas: Normal.  Kidneys: Small bilateral renal cysts. No hydronephrosis or obstructing stone.    Bowel:  There is no small bowel obstruction.  Oral contrast is seen as distally as the rectum. The appendix is unremarkable. No significant fecal load. Sigmoid diverticulosis.    Bladder: Decompressed by Escobar catheter.  Pelvic organs: The prostate is not enlarged.    There is no significant adenopathy.  Vasculature: The aorta is not dilated.Moderate atherosclerotic vascular calcification.  Left groin femoral vein approach central venous catheter with tip terminating in the common iliac vein.    Retroperitoneum: There is no mass.  Bones: Unremarkable.  Subcutaneous tissues: Diffuse subcutaneous edema.    IMPRESSION:    Multifocal pneumonia. Small left pleural effusion. Recommend follow-up to resolution.  Cardiomegaly.    No retroperitoneal hematoma.  Subcutaneous edema.            HERLINDA GARCIA MD; Attending Radiologist  This document has been electronically signed. 2020  4:45AM    < end of copied text >         @ 07:  -   @ 07:00  --------------------------------------------------------  IN: 100 mL / OUT: 165 mL / NET: -65 mL        Mode: AC/ CMV (Assist Control/ Continuous Mandatory Ventilation)  RR (machine): 22  TV (machine): 450  FiO2: 100  PEEP: 12  ITime: 1  MAP: 18  PIP: 31      PHYSICAL EXAM:    GENERAL: intubated and sedated   HEAD:  Atraumatic, Normocephalic  EYES: EOMI, PERRLA, conjunctiva and sclera clear  ENMT: No tonsillar erythema, exudates, or enlargement; Moist mucous membranes, Good dentition, No lesions  NECK: Supple, normal appearance, No JVD; Normal thyroid; Trachea midline  NERVOUS SYSTEM:  Alert & Oriented X0  CHEST/LUNG: No chest deformity; Normal percussion bilaterally; No rales, rhonchi, wheezing   HEART: Regular rate and rhythm; No murmurs, rubs, or gallops  ABDOMEN: Soft, Nontender, Nondistended; Bowel sounds present  EXTREMITIES:  2+ Peripheral Pulses, No clubbing, cyanosis, or edema  LYMPH: No lymphadenopathy noted  SKIN: No rashes or lesions; Good capillary refill      LABS:  CBC Full  -  ( 2020 06:39 )  WBC Count : 15.77 K/uL  RBC Count : 2.14 M/uL  Hemoglobin : 7.2 g/dL  Hematocrit : 22.0 %  Platelet Count - Automated : 261 K/uL  Mean Cell Volume : 102.8 fl  Mean Cell Hemoglobin : 33.6 pg  Mean Cell Hemoglobin Concentration : 32.7 gm/dL  Auto Neutrophil # : 14.33 K/uL  Auto Lymphocyte # : 0.50 K/uL  Auto Monocyte # : 0.76 K/uL  Auto Eosinophil # : 0.05 K/uL  Auto Basophil # : 0.03 K/uL  Auto Neutrophil % : 90.9 %  Auto Lymphocyte % : 3.2 %  Auto Monocyte % : 4.8 %  Auto Eosinophil % : 0.3 %  Auto Basophil % : 0.2 %    11-    135  |  102  |  26<H>  ----------------------------<  125<H>  3.9   |  25  |  3.03<H>    Ca    7.6<L>      2020 23:44  Phos  6.1     11-13  Mg     2.5     11-13    TPro  5.5<L>  /  Alb  1.5<L>  /  TBili  0.4  /  DBili  x   /  AST  56<H>  /  ALT  23  /  AlkPhos  71  11-13    PT/INR - ( 2020 16:24 )   PT: 21.2 sec;   INR: 1.83 ratio         PTT - ( 2020 13:40 )  PTT:34.4 sec  Urinalysis Basic - ( 2020 18:04 )    Color: Yellow / Appearance: Clear / S.025 / pH: x  Gluc: x / Ketone: Negative  / Bili: Negative / Urobili: Negative   Blood: x / Protein: 100 / Nitrite: Negative   Leuk Esterase: Trace / RBC: 5-10 /HPF / WBC 3-5 /HPF   Sq Epi: x / Non Sq Epi: Few /HPF / Bacteria: Few /HPF      Culture Results:   No growth ( @ 21:54)  Culture Results:   No growth to date. ( @ 14:21)  Culture Results:   No growth to date. ( @ 14:21)      RADIOLOGY & ADDITIONAL STUDIES REVIEWED:  ***    GOALS OF CARE DISCUSSION WITH PATIENT/FAMILY/PROXY:    CRITICAL CARE TIME SPENT: 35 minutes

## 2020-11-14 NOTE — PROGRESS NOTE ADULT - ASSESSMENT
Pt is a 84 yo male with PMHx of CAD with stent, Afib on Eliquis, HTN, HLD, Gout, who presented to ED for generalized weakness for the past 2 weeks, generalized deteriorating progressively, and worsening cough. Son also reports that the patient's mental status has been slowly deteriorating. Admitted with fluid overload.    A/P:  Renal failure:  Unclear baseline  Acute Vs CKD but CKD is likely  Etiology?  Has HTN, Proteinuria-Glomerulonephritis can not be ruled out.  Continue HD.      Fluid Overload:  SOB possible sec to fluid overload  Follow up CT chest to rule out other etiology  S/P intubation and is now on HD.  UF at HD.  Monitor I/O  Daily weight  D/W his son got consent for HD      Proteinuria:  Etiology?  Will get vasculitis work up  JAI, C3, C4, ANCA, anti-GBM, Hep C, HBsAg, SPEP, Serum immunofixation, serum free light chain, RPR, HIV, urine protein/cr ratio    Hyponatremia:  Likely sec to fluid overload  Rule out SIADH  Get Urine Na, Urine osmolality, TSH, serum cortisol    Acidosis:  Both rep and metabolic-Non-AG  Hold HCO3 now that he is on HD  Vent management per ICU    Anemia:  Etiology?  Get iron study, B12, folate  Transfuse PRN to keep Hb >8.0  Monitor Hb

## 2020-11-14 NOTE — DIETITIAN INITIAL EVALUATION ADULT. - OTHER INFO
Pt lives home PTA, sedated, on vent support; generalized weakness for the past 2 weeks, generalized deteriorating progressively per H&P; HD started 11/13/20 due to renal failure, Nephrology on the case; Limited intake/wt change history data available at present, NPO x 2 to 3d in-house; No GI distress reported at present, poor prognosis per team

## 2020-11-14 NOTE — DIETITIAN INITIAL EVALUATION ADULT. - PERTINENT MEDS FT
MEDICATIONS  (STANDING):  ALBUTerol    90 MICROgram(s) HFA Inhaler 2 Puff(s) Inhalation every 6 hours  chlorhexidine 0.12% Liquid 15 milliLiter(s) Oral Mucosa every 12 hours  chlorhexidine 2% Cloths 1 Application(s) Topical <User Schedule>  fentaNYL   Infusion. 0.5 MICROgram(s)/kG/Hr (3.83 mL/Hr) IV Continuous <Continuous>  finasteride 5 milliGRAM(s) Oral daily  norepinephrine Infusion 0.05 MICROgram(s)/kG/Min (3.59 mL/Hr) IV Continuous <Continuous>  pancrelipase  (CREON  6,000 Lipase Units) 6 Capsule(s) Oral three times a day with meals  pantoprazole  Injectable 40 milliGRAM(s) IV Push daily  piperacillin/tazobactam IVPB.. 3.375 Gram(s) IV Intermittent every 12 hours  ranolazine 500 milliGRAM(s) Oral two times a day  sodium bicarbonate 650 milliGRAM(s) Oral every 8 hours  tamsulosin 0.4 milliGRAM(s) Oral at bedtime

## 2020-11-15 NOTE — PROGRESS NOTE ADULT - ASSESSMENT
ASSESSMENT AND PLAN:  Pt is 84 yo male with PMHx of CAD with stent, Afib on Eliquis, HTN, HLD, Gout who presented with weakness and shortness of breath. Pt is being admitted to ICU for hypoxic respiratory failure requiring BIPAP support.    1.Hypoxic Resp failure due to pneumonia and fluid overload  2.CKD  3.Acute encephalopathy  4.Afib on Eliquis  5.HTN  6.BPH  7.Anemia    Neuro  Patient intubated thus morning ( 11/13/2020)  sedated on propofol and fentanyl.    -Acute Encephalopathy vs worsening dementia  CT head on admission negative for any acute stroke  BUN elevated to 51  Monitor mental status closely    Cardiovascular:  Patient off anticoagulation for catheter placement,  will restart anticoagulation once catheter is in.,  Patient on 2 vasopressors to maintain MAP of > 65  levophed and vasopressin    CAD  Pt on eliquis at home  Pt with recent echo done on 10/22 which showed normal EF, mild-mod MR and mild tricuspid regurgitation  EKG with Afib, no acute ischemic changes noted    Acute Pulmonary edema  Likely secondary to fluid overload from worsening CKD  Pt s/p 2L ivf in ED  Patient was started on lasix 80 bid.  Urology consulted    Pulmonary  Patient intubated on 11/13/2020 for hypoxia   -Acute hypoxic respiratory failure s/s to Pneumonia and fluid overload       Infections  -Pneumonia  Will continue with broad spectrum abx  F/u blood cultures  Covid negative  F/u RVP and repeat covid in 24 hours    Nephro  -Progressive CKD  Pt with baseline Cr of around 2.7 05/20 with prior h/o post infectious GN  Pt Cr 5 on last admission.  Patient has severe metabolic acidosis.  Patient started on PO sodium Bicarb  Patient was started  on lasix 80mg with minimal urine out put so it was doscontinued .   Shiley was placed patient went for dialysis .   Strict I/O monitoring  Dr Caputo on board.      Gastrointestinal  -Patient started on tube feeds.    Heme  -baseline around 8.7 as per documentation  Patient received 2 PRBC on 11/14/2020.  post transfusion CBC  noted .  heparin drip on hold.    Skin/Catheters  -No acute issues    Prophylaxis   -will start heparin drip. but h/h unstable   -pantoprazole for GI ppx

## 2020-11-15 NOTE — PROGRESS NOTE ADULT - ASSESSMENT
Pt is a 84 yo male with PMHx of CAD with stent, Afib on Eliquis, HTN, HLD, Gout, who presented to ED for generalized weakness for the past 2 weeks, generalized deteriorating progressively, and worsening cough. Son also reports that the patient's mental status has been slowly deteriorating. Admitted with fluid overload.    A/P:  Renal failure:  Unclear baseline  Acute Vs CKD but CKD is likely  Etiology?  Has HTN, Proteinuria-Glomerulonephritis can not be ruled out.      Fluid Overload:  SOB sec to fluid overload, also has multifocal pneumonia  S/P intubation and is now on HD.  UF with HD as tolerates, next HD tomnorrow  Currently on vasopressure, vitals making it difficult to remove much fluid.  Monitor I/O  Daily weight  D/W his son got consent for HD      Proteinuria:  Etiology?  Urine protein/cr ratio 2.4gm  Follow up vasculitis work up. C3 is low  Has negative JAI, C4, Hep C, HBsAg,  serum free light chain, RPR   Pending ANCA, anti-GBM, SPEP, Serum immunofixation, HIV      Hyponatremia:  Likely sec to fluid overload  Ruled out SIADH  Better     Acidosis:  Both rep and metabolic-Non-AG  Hold HCO3 now that he is on HD  Vent management per ICU    Anemia:  Etiology?  Iron study suggest iron deficiency  Can't give IV iron in view of infection  Get B12, folate  Transfuse PRN to keep Hb >8.0  Monitor Hb

## 2020-11-15 NOTE — PROGRESS NOTE ADULT - SUBJECTIVE AND OBJECTIVE BOX
INTERVAL HPI/OVERNIGHT EVENTS: Patient got dialysis yesterday. post dialysis labs noted. Patients vasopressor requirement decreased.     PRESSORS: [ ] YES [ ] NO  WHICH:    ANTIBIOTICS:                      Antimicrobial:  piperacillin/tazobactam IVPB.. 3.375 Gram(s) IV Intermittent every 12 hours    Cardiovascular:  norepinephrine Infusion 0.85 MICROgram(s)/kG/Min IV Continuous <Continuous>  ranolazine 500 milliGRAM(s) Oral two times a day  tamsulosin 0.4 milliGRAM(s) Oral at bedtime    Pulmonary:  ALBUTerol    90 MICROgram(s) HFA Inhaler 2 Puff(s) Inhalation every 6 hours    Hematalogic:    Other:  chlorhexidine 0.12% Liquid 15 milliLiter(s) Oral Mucosa every 12 hours  chlorhexidine 2% Cloths 1 Application(s) Topical <User Schedule>  fentaNYL   Infusion. 0.5 MICROgram(s)/kG/Hr IV Continuous <Continuous>  finasteride 5 milliGRAM(s) Oral daily  pancrelipase  (CREON  6,000 Lipase Units) 6 Capsule(s) Oral three times a day with meals  pantoprazole  Injectable 40 milliGRAM(s) IV Push daily  sodium bicarbonate 650 milliGRAM(s) Oral every 8 hours  vasopressin Infusion 0.04 Unit(s)/Min IV Continuous <Continuous>    ALBUTerol    90 MICROgram(s) HFA Inhaler 2 Puff(s) Inhalation every 6 hours  chlorhexidine 0.12% Liquid 15 milliLiter(s) Oral Mucosa every 12 hours  chlorhexidine 2% Cloths 1 Application(s) Topical <User Schedule>  fentaNYL   Infusion. 0.5 MICROgram(s)/kG/Hr IV Continuous <Continuous>  finasteride 5 milliGRAM(s) Oral daily  norepinephrine Infusion 0.85 MICROgram(s)/kG/Min IV Continuous <Continuous>  pancrelipase  (CREON  6,000 Lipase Units) 6 Capsule(s) Oral three times a day with meals  pantoprazole  Injectable 40 milliGRAM(s) IV Push daily  piperacillin/tazobactam IVPB.. 3.375 Gram(s) IV Intermittent every 12 hours  ranolazine 500 milliGRAM(s) Oral two times a day  sodium bicarbonate 650 milliGRAM(s) Oral every 8 hours  tamsulosin 0.4 milliGRAM(s) Oral at bedtime  vasopressin Infusion 0.04 Unit(s)/Min IV Continuous <Continuous>    Drug Dosing Weight  Height (cm): 177.8 (12 Nov 2020 08:58)  Weight (kg): 76.5 (12 Nov 2020 08:58)  BMI (kg/m2): 24.2 (12 Nov 2020 08:58)  BSA (m2): 1.94 (12 Nov 2020 08:58)    CENTRAL LINE: [ ] YES [ ] NO  LOCATION:   DATE INSERTED:  REMOVE: [ ] YES [ ] NO  EXPLAIN:    ESCOBAR: [ ] YES [ ] NO    DATE INSERTED:  REMOVE:  [ ] YES [ ] NO  EXPLAIN:    A-LINE:  [ ] YES [ ] NO  LOCATION:   DATE INSERTED:  REMOVE:  [ ] YES [ ] NO  EXPLAIN:    PMH -reviewed admission note, no change since admission    ICU Vital Signs Last 24 Hrs  T(C): 37.2 (15 Nov 2020 00:00), Max: 37.2 (15 Nov 2020 00:00)  T(F): 98.9 (15 Nov 2020 00:00), Max: 98.9 (15 Nov 2020 00:00)  HR: 103 (15 Nov 2020 00:15) (92 - 139)  BP: 102/86 (15 Nov 2020 00:15) (71/40 - 154/101)  BP(mean): 90 (15 Nov 2020 00:15) (47 - 115)  ABP: --  ABP(mean): --  RR: 23 (15 Nov 2020 00:15) (0 - 32)  SpO2: 100% (15 Nov 2020 00:15) (91% - 100%)      ABG - ( 14 Nov 2020 21:50 )  pH, Arterial: 7.24  pH, Blood: x     /  pCO2: 54    /  pO2: 132   / HCO3: 22    / Base Excess: -5.0  /  SaO2: 99                    11-13 @ 07:01  -  11-14 @ 07:00  --------------------------------------------------------  IN: 4569.6 mL / OUT: 2832 mL / NET: 1737.6 mL        Mode: AC/ CMV (Assist Control/ Continuous Mandatory Ventilation)  RR (machine): 26  TV (machine): 510  FiO2: 90  PEEP: 12  ITime: 1  MAP: 23  PIP: 33      PHYSICAL EXAM:    GENERAL: intubated and sedated   HEAD:  Atraumatic, Normocephalic  EYES: EOMI, PERRLA, conjunctiva and sclera clear  ENMT: No tonsillar erythema, exudates, or enlargement; Moist mucous membranes, Good dentition, No lesions  NECK: Supple, normal appearance, No JVD; Normal thyroid; Trachea midline  NERVOUS SYSTEM:  Alert & Oriented X0  CHEST/LUNG: No chest deformity; Normal percussion bilaterally; No rales, rhonchi, wheezing   HEART: Regular rate and rhythm; No murmurs, rubs, or gallops  ABDOMEN: Soft, Nontender, Nondistended; Bowel sounds present  EXTREMITIES:  2+ Peripheral Pulses, No clubbing, cyanosis, or edema  LYMPH: No lymphadenopathy noted  SKIN: No rashes or lesions; Good capillary refill    LABS:  CBC Full  -  ( 14 Nov 2020 20:22 )  WBC Count : 11.25 K/uL  RBC Count : 2.53 M/uL  Hemoglobin : 8.4 g/dL  Hematocrit : 25.0 %  Platelet Count - Automated : 158 K/uL  Mean Cell Volume : 98.8 fl  Mean Cell Hemoglobin : 33.2 pg  Mean Cell Hemoglobin Concentration : 33.6 gm/dL  Auto Neutrophil # : x  Auto Lymphocyte # : x  Auto Monocyte # : x  Auto Eosinophil # : x  Auto Basophil # : x  Auto Neutrophil % : x  Auto Lymphocyte % : x  Auto Monocyte % : x  Auto Eosinophil % : x  Auto Basophil % : x    11-14    136  |  102  |  23<H>  ----------------------------<  113<H>  3.9   |  24  |  2.97<H>    Ca    6.9<L>      14 Nov 2020 20:22  Phos  5.9     11-14  Mg     2.0     11-14    TPro  5.3<L>  /  Alb  1.7<L>  /  TBili  0.6  /  DBili  x   /  AST  174<H>  /  ALT  90<H>  /  AlkPhos  70  11-14    PT/INR - ( 14 Nov 2020 00:43 )   PT: 18.2 sec;   INR: 1.56 ratio         PTT - ( 14 Nov 2020 00:43 )  PTT:37.5 sec    Culture Results:   No growth to date. (11-13 @ 19:06)  Culture Results:   No growth (11-12 @ 21:54)  Culture Results:   No growth to date. (11-12 @ 14:21)  Culture Results:   No growth to date. (11-12 @ 14:21)      RADIOLOGY & ADDITIONAL STUDIES REVIEWED:  ***    GOALS OF CARE DISCUSSION WITH PATIENT/FAMILY/PROXY:    CRITICAL CARE TIME SPENT: 35 minutes INTERVAL HPI/OVERNIGHT EVENTS: Patient got dialysis yesterday. post dialysis labs noted. Patients vasopressor requirement decreased.     PRESSORS: [ ] YES [ ] NO  WHICH:    ANTIBIOTICS:                      Antimicrobial:  piperacillin/tazobactam IVPB.. 3.375 Gram(s) IV Intermittent every 12 hours    Cardiovascular:  norepinephrine Infusion 0.85 MICROgram(s)/kG/Min IV Continuous <Continuous>  ranolazine 500 milliGRAM(s) Oral two times a day  tamsulosin 0.4 milliGRAM(s) Oral at bedtime    Pulmonary:  ALBUTerol    90 MICROgram(s) HFA Inhaler 2 Puff(s) Inhalation every 6 hours    Hematalogic:    Other:  chlorhexidine 0.12% Liquid 15 milliLiter(s) Oral Mucosa every 12 hours  chlorhexidine 2% Cloths 1 Application(s) Topical <User Schedule>  fentaNYL   Infusion. 0.5 MICROgram(s)/kG/Hr IV Continuous <Continuous>  finasteride 5 milliGRAM(s) Oral daily  pancrelipase  (CREON  6,000 Lipase Units) 6 Capsule(s) Oral three times a day with meals  pantoprazole  Injectable 40 milliGRAM(s) IV Push daily  sodium bicarbonate 650 milliGRAM(s) Oral every 8 hours  vasopressin Infusion 0.04 Unit(s)/Min IV Continuous <Continuous>    ALBUTerol    90 MICROgram(s) HFA Inhaler 2 Puff(s) Inhalation every 6 hours  chlorhexidine 0.12% Liquid 15 milliLiter(s) Oral Mucosa every 12 hours  chlorhexidine 2% Cloths 1 Application(s) Topical <User Schedule>  fentaNYL   Infusion. 0.5 MICROgram(s)/kG/Hr IV Continuous <Continuous>  finasteride 5 milliGRAM(s) Oral daily  norepinephrine Infusion 0.85 MICROgram(s)/kG/Min IV Continuous <Continuous>  pancrelipase  (CREON  6,000 Lipase Units) 6 Capsule(s) Oral three times a day with meals  pantoprazole  Injectable 40 milliGRAM(s) IV Push daily  piperacillin/tazobactam IVPB.. 3.375 Gram(s) IV Intermittent every 12 hours  ranolazine 500 milliGRAM(s) Oral two times a day  sodium bicarbonate 650 milliGRAM(s) Oral every 8 hours  tamsulosin 0.4 milliGRAM(s) Oral at bedtime  vasopressin Infusion 0.04 Unit(s)/Min IV Continuous <Continuous>    Drug Dosing Weight  Height (cm): 177.8 (12 Nov 2020 08:58)  Weight (kg): 76.5 (12 Nov 2020 08:58)  BMI (kg/m2): 24.2 (12 Nov 2020 08:58)  BSA (m2): 1.94 (12 Nov 2020 08:58)    CENTRAL LINE: [ ] YES [ ] NO  LOCATION:   DATE INSERTED:  REMOVE: [ ] YES [ ] NO  EXPLAIN:    ESCOBAR: [ ] YES [ ] NO    DATE INSERTED:  REMOVE:  [ ] YES [ ] NO  EXPLAIN:    A-LINE:  [ ] YES [ ] NO  LOCATION:   DATE INSERTED:  REMOVE:  [ ] YES [ ] NO  EXPLAIN:    PMH -reviewed admission note, no change since admission    ICU Vital Signs Last 24 Hrs  T(C): 37.2 (15 Nov 2020 00:00), Max: 37.2 (15 Nov 2020 00:00)  T(F): 98.9 (15 Nov 2020 00:00), Max: 98.9 (15 Nov 2020 00:00)  HR: 103 (15 Nov 2020 00:15) (92 - 139)  BP: 102/86 (15 Nov 2020 00:15) (71/40 - 154/101)  BP(mean): 90 (15 Nov 2020 00:15) (47 - 115)  ABP: --  ABP(mean): --  RR: 23 (15 Nov 2020 00:15) (0 - 32)  SpO2: 100% (15 Nov 2020 00:15) (91% - 100%)      ABG - ( 14 Nov 2020 21:50 )  pH, Arterial: 7.24  pH, Blood: x     /  pCO2: 54    /  pO2: 132   / HCO3: 22    / Base Excess: -5.0  /  SaO2: 99                    11-13 @ 07:01  -  11-14 @ 07:00  --------------------------------------------------------  IN: 4569.6 mL / OUT: 2832 mL / NET: 1737.6 mL        Mode: AC/ CMV (Assist Control/ Continuous Mandatory Ventilation)  RR (machine): 26  TV (machine): 510  FiO2: 90  PEEP: 12  ITime: 1  MAP: 23  PIP: 33      PHYSICAL EXAM:    GENERAL: intubated and sedated   HEAD:  Atraumatic, Normocephalic  EYES: EOMI, PERRLA, conjunctiva and sclera clear  ENMT: No tonsillar erythema, exudates, or enlargement; Moist mucous membranes, Good dentition, No lesions  NECK: Supple, normal appearance, No JVD; Normal thyroid; Trachea midline  NERVOUS SYSTEM:  sedated   CHEST/LUNG: No chest deformity; Normal percussion bilaterally; No rales, rhonchi, wheezing   HEART: Regular rate and rhythm; No murmurs, rubs, or gallops  ABDOMEN: Soft, Nontender, Nondistended; Bowel sounds present  EXTREMITIES:  2+ Peripheral Pulses, No clubbing, cyanosis, or edema  LYMPH: No lymphadenopathy noted  SKIN: No rashes or lesions; Good capillary refill    LABS:  CBC Full  -  ( 14 Nov 2020 20:22 )  WBC Count : 11.25 K/uL  RBC Count : 2.53 M/uL  Hemoglobin : 8.4 g/dL  Hematocrit : 25.0 %  Platelet Count - Automated : 158 K/uL  Mean Cell Volume : 98.8 fl  Mean Cell Hemoglobin : 33.2 pg  Mean Cell Hemoglobin Concentration : 33.6 gm/dL  Auto Neutrophil # : x  Auto Lymphocyte # : x  Auto Monocyte # : x  Auto Eosinophil # : x  Auto Basophil # : x  Auto Neutrophil % : x  Auto Lymphocyte % : x  Auto Monocyte % : x  Auto Eosinophil % : x  Auto Basophil % : x    11-14    136  |  102  |  23<H>  ----------------------------<  113<H>  3.9   |  24  |  2.97<H>    Ca    6.9<L>      14 Nov 2020 20:22  Phos  5.9     11-14  Mg     2.0     11-14    TPro  5.3<L>  /  Alb  1.7<L>  /  TBili  0.6  /  DBili  x   /  AST  174<H>  /  ALT  90<H>  /  AlkPhos  70  11-14    PT/INR - ( 14 Nov 2020 00:43 )   PT: 18.2 sec;   INR: 1.56 ratio         PTT - ( 14 Nov 2020 00:43 )  PTT:37.5 sec    Culture Results:   No growth to date. (11-13 @ 19:06)  Culture Results:   No growth (11-12 @ 21:54)  Culture Results:   No growth to date. (11-12 @ 14:21)  Culture Results:   No growth to date. (11-12 @ 14:21)      RADIOLOGY & ADDITIONAL STUDIES REVIEWED:  ***    CXR:  < from: Xray Chest 1 View- PORTABLE-Routine (Xray Chest 1 View- PORTABLE-Routine in AM.) (11.15.20 @ 09:34) >    EXAM:  XR CHEST PORTABLE URGENT 1V                          EXAM:  XR CHEST PORTABLE ROUTINE 1V                          EXAM:  XR CHEST PORTABLE URGENT 1V                            PROCEDURE DATE:  11/14/2020          INTERPRETATION:  Clinical Information: Respiratory failure    Technique: 2 AP chest images from 11/14/2020 and 1 AP chest image from 11/15/2020.    Comparison: 11/12/2020    Findings/  Impression: Left IJ catheter tip at the SVC. NG tip in the stomach. ET tip above nikos. Status post coronary artery stents. Cardiomegaly. Small left pleural effusion. Moderate pulmonary edema.            JOCELYN WILLIAMSON MD; Attending Interventional Radiologist  This document has been electronically signed. Nov 15 2020 11:39AM    < end of copied text >  GOALS OF CARE DISCUSSION WITH PATIENT/FAMILY/PROXY:    CRITICAL CARE TIME SPENT: 35 minutes

## 2020-11-15 NOTE — PROGRESS NOTE ADULT - SUBJECTIVE AND OBJECTIVE BOX
Dr. Vaughan  Office [phone]  Cell [phone]  Pallavi GRAJEDA  Cell [phone]    RENAL PROGRESS NOTE: DATE OF SERVICE 11-15-20 @ 13:53    Patient is a 85y old  Male who presents with a chief complaint of Shortness of breath (15 Nov 2020 00:34)      Patient seen and examined at bedside in MICU. Intubated on vasopressure    VITALS:  T(F): 99.1 (11-15-20 @ 04:00), Max: 99.1 (11-15-20 @ 04:00)  HR: 115 (11-15-20 @ 12:35)  BP: 128/69 (11-15-20 @ 12:35)  RR: 26 (11-15-20 @ 12:35)  SpO2: 100% (11-15-20 @ 12:35)  Wt(kg): --    11-14 @ 07:01  -  11-15 @ 07:00  --------------------------------------------------------  IN: 3785 mL / OUT: 1321 mL / NET: 2464 mL    11-15 @ 07:01  -  11-15 @ 13:53  --------------------------------------------------------  IN: 258 mL / OUT: 20 mL / NET: 238 mL          PHYSICAL EXAM:  Constitutional: NAD  Neck: No JVD  Respiratory: bilateral basal crackles+  Cardiovascular: S1, S2, RRR  Gastrointestinal: BS+, soft, NT/ND  Extremities: 3-4+ peripheral edema    Hospital Medications:   MEDICATIONS  (STANDING):  ALBUTerol    90 MICROgram(s) HFA Inhaler 2 Puff(s) Inhalation every 6 hours  chlorhexidine 0.12% Liquid 15 milliLiter(s) Oral Mucosa every 12 hours  chlorhexidine 2% Cloths 1 Application(s) Topical <User Schedule>  fentaNYL   Infusion. 0.5 MICROgram(s)/kG/Hr (3.83 mL/Hr) IV Continuous <Continuous>  finasteride 5 milliGRAM(s) Oral daily  norepinephrine Infusion 0.85 MICROgram(s)/kG/Min (61 mL/Hr) IV Continuous <Continuous>  pancrelipase  (CREON  6,000 Lipase Units) 6 Capsule(s) Oral three times a day with meals  pantoprazole  Injectable 40 milliGRAM(s) IV Push daily  piperacillin/tazobactam IVPB.. 3.375 Gram(s) IV Intermittent every 12 hours  ranolazine 500 milliGRAM(s) Oral two times a day  sodium bicarbonate 650 milliGRAM(s) Oral every 8 hours  tamsulosin 0.4 milliGRAM(s) Oral at bedtime  vasopressin Infusion 0.04 Unit(s)/Min (2.4 mL/Hr) IV Continuous <Continuous>      LABS:  11-15    x   |  x   |  x   ----------------------------<  x   x    |  x   |  2.77<H>    Ca    6.8<L>      15 Nov 2020 06:45  Phos  3.4     11-15  Mg     2.2     11-15    TPro  4.9<L>  /  Alb  1.6<L>  /  TBili  0.8  /  DBili      /  AST  96<H>  /  ALT  70<H>  /  AlkPhos  60  11-15    Creatinine Trend: 2.77 <--, 3.51 <--, 2.97 <--, 4.21 <--, 3.27 <--, 3.03 <--, 5.96 <--, 5.37 <--, 5.60 <--    Phosphorus Level, Serum: 3.4 mg/dL (11-15 @ 07:08)  Albumin, Serum: 1.6 g/dL (11-15 @ 06:45)  Phosphorus Level, Serum: 3.0 mg/dL (11-15 @ 06:45)                              8.1    13.04 )-----------( 161      ( 15 Nov 2020 06:45 )             24.6     Urine Studies:  Urinalysis - [11-12-20 @ 18:04]      Color Yellow / Appearance Clear / SG 1.025 / pH 5.0      Gluc Negative / Ketone Negative  / Bili Negative / Urobili Negative       Blood Moderate / Protein 100 / Leuk Est Trace / Nitrite Negative      RBC 5-10 / WBC 3-5 / Hyaline 0-2 / Gran 5-10 / Sq Epi  / Non Sq Epi Few / Bacteria Few    Urine Creatinine 88      [11-13-20 @ 13:49]  Urine Protein 218      [11-13-20 @ 13:49]  Urine Sodium 41      [11-13-20 @ 13:49]  Urine Osmolality 284      [11-13-20 @ 13:49]    Iron 24, TIBC 142, %sat 17      [11-13-20 @ 13:38]  TSH 2.01      [11-13-20 @ 06:39]  Lipid: chol 172, TG 83, HDL 46, LDL --      [11-13-20 @ 06:39]    HBsAg Nonreact      [11-14-20 @ 10:25]  HCV 0.15, Nonreact      [11-14-20 @ 10:25]    JAI: titer Negative, pattern --      [11-13-20 @ 20:35]  C3 Complement 52      [11-13-20 @ 22:30]  C4 Complement 20      [11-13-20 @ 22:30]  Syphilis Screen (Treponema Pallidum Ab) Negative      [11-13-20 @ 20:35]  Free Light Chains: kappa 14.62, lambda 10.97, ratio = 1.33      [11-13 @ 22:30]    RADIOLOGY & ADDITIONAL STUDIES:     Dr. Vaughan  Office [phone]  Cell [phone]  Pallavi GRAJEDA  Cell [phone]    RENAL PROGRESS NOTE: DATE OF SERVICE 11-15-20 @ 13:53    Patient is a 85y old  Male who presents with a chief complaint of Shortness of breath (15 Nov 2020 00:34)      Patient seen and examined at bedside in MICU. Intubated on vasopressure    VITALS:  T(F): 99.1 (11-15-20 @ 04:00), Max: 99.1 (11-15-20 @ 04:00)  HR: 115 (11-15-20 @ 12:35)  BP: 128/69 (11-15-20 @ 12:35)  RR: 26 (11-15-20 @ 12:35)  SpO2: 100% (11-15-20 @ 12:35)  Wt(kg): --    11-14 @ 07:01  -  11-15 @ 07:00  --------------------------------------------------------  IN: 3785 mL / OUT: 1321 mL / NET: 2464 mL    11-15 @ 07:01  -  11-15 @ 13:53  --------------------------------------------------------  IN: 258 mL / OUT: 20 mL / NET: 238 mL          PHYSICAL EXAM:  Constitutional: Intubated  Neck: No JVD  Respiratory: bilateral basal crackles+  Cardiovascular: S1, S2, RRR  Gastrointestinal: BS+, soft, NT/ND  Extremities: 3-4+ peripheral edema    Hospital Medications:   MEDICATIONS  (STANDING):  ALBUTerol    90 MICROgram(s) HFA Inhaler 2 Puff(s) Inhalation every 6 hours  chlorhexidine 0.12% Liquid 15 milliLiter(s) Oral Mucosa every 12 hours  chlorhexidine 2% Cloths 1 Application(s) Topical <User Schedule>  fentaNYL   Infusion. 0.5 MICROgram(s)/kG/Hr (3.83 mL/Hr) IV Continuous <Continuous>  finasteride 5 milliGRAM(s) Oral daily  norepinephrine Infusion 0.85 MICROgram(s)/kG/Min (61 mL/Hr) IV Continuous <Continuous>  pancrelipase  (CREON  6,000 Lipase Units) 6 Capsule(s) Oral three times a day with meals  pantoprazole  Injectable 40 milliGRAM(s) IV Push daily  piperacillin/tazobactam IVPB.. 3.375 Gram(s) IV Intermittent every 12 hours  ranolazine 500 milliGRAM(s) Oral two times a day  sodium bicarbonate 650 milliGRAM(s) Oral every 8 hours  tamsulosin 0.4 milliGRAM(s) Oral at bedtime  vasopressin Infusion 0.04 Unit(s)/Min (2.4 mL/Hr) IV Continuous <Continuous>      LABS:  11-15    x   |  x   |  x   ----------------------------<  x   x    |  x   |  2.77<H>    Ca    6.8<L>      15 Nov 2020 06:45  Phos  3.4     11-15  Mg     2.2     11-15    TPro  4.9<L>  /  Alb  1.6<L>  /  TBili  0.8  /  DBili      /  AST  96<H>  /  ALT  70<H>  /  AlkPhos  60  11-15    Creatinine Trend: 2.77 <--, 3.51 <--, 2.97 <--, 4.21 <--, 3.27 <--, 3.03 <--, 5.96 <--, 5.37 <--, 5.60 <--    Phosphorus Level, Serum: 3.4 mg/dL (11-15 @ 07:08)  Albumin, Serum: 1.6 g/dL (11-15 @ 06:45)  Phosphorus Level, Serum: 3.0 mg/dL (11-15 @ 06:45)                              8.1    13.04 )-----------( 161      ( 15 Nov 2020 06:45 )             24.6     Urine Studies:  Urinalysis - [11-12-20 @ 18:04]      Color Yellow / Appearance Clear / SG 1.025 / pH 5.0      Gluc Negative / Ketone Negative  / Bili Negative / Urobili Negative       Blood Moderate / Protein 100 / Leuk Est Trace / Nitrite Negative      RBC 5-10 / WBC 3-5 / Hyaline 0-2 / Gran 5-10 / Sq Epi  / Non Sq Epi Few / Bacteria Few    Urine Creatinine 88      [11-13-20 @ 13:49]  Urine Protein 218      [11-13-20 @ 13:49]  Urine Sodium 41      [11-13-20 @ 13:49]  Urine Osmolality 284      [11-13-20 @ 13:49]    Iron 24, TIBC 142, %sat 17      [11-13-20 @ 13:38]  TSH 2.01      [11-13-20 @ 06:39]  Lipid: chol 172, TG 83, HDL 46, LDL --      [11-13-20 @ 06:39]    HBsAg Nonreact      [11-14-20 @ 10:25]  HCV 0.15, Nonreact      [11-14-20 @ 10:25]    JAI: titer Negative, pattern --      [11-13-20 @ 20:35]  C3 Complement 52      [11-13-20 @ 22:30]  C4 Complement 20      [11-13-20 @ 22:30]  Syphilis Screen (Treponema Pallidum Ab) Negative      [11-13-20 @ 20:35]  Free Light Chains: kappa 14.62, lambda 10.97, ratio = 1.33      [11-13 @ 22:30]    RADIOLOGY & ADDITIONAL STUDIES:

## 2020-11-15 NOTE — PROGRESS NOTE ADULT - SUBJECTIVE AND OBJECTIVE BOX
SHWETHA RANDALL  85y  Patient is a 85y old  Male who presents with a chief complaint of Shortness of breath (15 Nov 2020 13:53)    HPI:  This is a patient with CKD, now on HD. Became hypotensive at HD yesterday.  Sedated and intubated, requiring pressor support.      MEDICATIONS  (STANDING):  ALBUTerol    90 MICROgram(s) HFA Inhaler 2 Puff(s) Inhalation every 6 hours  chlorhexidine 0.12% Liquid 15 milliLiter(s) Oral Mucosa every 12 hours  chlorhexidine 2% Cloths 1 Application(s) Topical <User Schedule>  fentaNYL   Infusion. 0.5 MICROgram(s)/kG/Hr (3.83 mL/Hr) IV Continuous <Continuous>  finasteride 5 milliGRAM(s) Oral daily  heparin   Injectable 5000 Unit(s) SubCutaneous every 8 hours  norepinephrine Infusion 0.85 MICROgram(s)/kG/Min (61 mL/Hr) IV Continuous <Continuous>  pancrelipase  (CREON  6,000 Lipase Units) 6 Capsule(s) Oral three times a day with meals  pantoprazole  Injectable 40 milliGRAM(s) IV Push daily  piperacillin/tazobactam IVPB.. 3.375 Gram(s) IV Intermittent every 12 hours  ranolazine 500 milliGRAM(s) Oral two times a day  sodium bicarbonate 650 milliGRAM(s) Oral every 8 hours  tamsulosin 0.4 milliGRAM(s) Oral at bedtime  vasopressin Infusion 0.04 Unit(s)/Min (2.4 mL/Hr) IV Continuous <Continuous>    MEDICATIONS  (PRN):    Vital Signs Last 24 Hrs  T(C): 37.3 (15 Nov 2020 04:00), Max: 37.3 (15 Nov 2020 04:00)  T(F): 99.1 (15 Nov 2020 04:00), Max: 99.1 (15 Nov 2020 04:00)  HR: 107 (15 Nov 2020 15:00) (99 - 139)  BP: 100/47 (15 Nov 2020 15:00) (85/44 - 154/101)  BP(mean): 59 (15 Nov 2020 15:00) (53 - 126)  RR: 19 (15 Nov 2020 15:00) (10 - 30)  SpO2: 100% (15 Nov 2020 15:00) (84% - 100%)  Daily     Daily     PHYSICAL EXAM:  Constitutional:  He appears comfortable and not distressed. Not diaphoretic.    Respiratory: The lungs are clear to auscultation. No dullness and expansion is normal.    Cardiovascular: S1 and S2 are normal. No mummurs, rubs or gallops are present.    Gastrointestinal: The abdomen is soft. No tenderness is present. No masses are present. Bowel sounds are normal.    Genitourinary: The bladder is not distended. No CVA tenderness is present.    Extremities: No edema is noted. No deformities are present.    Neurological:  Tone, power and sensation are normal.     Skin: No leasions are seen  or palpated.                            8.1    13.04 )-----------( 161      ( 15 Nov 2020 06:45 )             24.6     11-15    x   |  x   |  x   ----------------------------<  x   x    |  x   |  2.77<H>    Ca    6.8<L>      15 Nov 2020 06:45  Phos  3.4     11-15  Mg     2.2     11-15    TPro  4.9<L>  /  Alb  1.6<L>  /  TBili  0.8  /  DBili  x   /  AST  96<H>  /  ALT  70<H>  /  AlkPhos  60  11-15

## 2020-11-15 NOTE — PROGRESS NOTE ADULT - ASSESSMENT
Pt is a 84 yo male with PMHx of CAD with stent, Afib on Eliquis, HTN, HLD, Gout, who presented to ED for generalized weakness for the past 2 weeks, generalized deteriorating progressively, and worsening cough. Son also reports that the patient's mental status has been slowly deteriorating. Admitted with fluid overload.    A/P:  Renal failure:  Unclear baseline. Started on HD, s/p HD yesterday complicated by hypotension at the end.  Acute Vs CKD but CKD is likely. Oliguric at present.  Etiology?  Has HTN and Proteinuria so Glomerulonephritis can not be ruled out.  - HD Tomorrow.  - UF to 2 l IF TOLERATED.    Fluid Overload:  SOB sec to fluid overload, also has multifocal pneumonia  S/P intubation and is now on HD.  UF with HD as tolerates, next HD tomorrow.    Currently on vasopressors, and was hypotensive at last treatment.   Monitor I/O  Daily weight  D/W his son got consent for HD      Proteinuria:  Etiology?  Urine protein/cr ratio 2.4gm  Follow up vasculitis work up. C3 is low  Has negative JAI, C4, Hep C, HBsAg,  serum free light chain, RPR   Pending ANCA, anti-GBM, SPEP, Serum immunofixation, HIV      Hyponatremia:  Likely sec to fluid overload  Ruled out SIADH  Better     Acidosis:  Both rep and metabolic-Non-AG  Hold HCO3 now that he is on HD  Vent management per ICU    Anemia:  Etiology?  Iron study suggest iron deficiency  Can't give IV iron in view of infection  Get B12, folate  Transfuse PRN to keep Hb >8.0  Monitor Hb

## 2020-11-16 NOTE — PROGRESS NOTE ADULT - SUBJECTIVE AND OBJECTIVE BOX
INTERVAL HPI/OVERNIGHT EVENTS: ***    PRESSORS: [ ] YES [ ] NO  WHICH:    ANTIBIOTICS:                  DATE STARTED:  ANTIBIOTICS:                  DATE STARTED:  ANTIBIOTICS:                  DATE STARTED:    Antimicrobial:  piperacillin/tazobactam IVPB.. 3.375 Gram(s) IV Intermittent every 12 hours    Cardiovascular:  norepinephrine Infusion 0.85 MICROgram(s)/kG/Min IV Continuous <Continuous>  ranolazine 500 milliGRAM(s) Oral two times a day  tamsulosin 0.4 milliGRAM(s) Oral at bedtime    Pulmonary:  ALBUTerol    90 MICROgram(s) HFA Inhaler 2 Puff(s) Inhalation every 6 hours    Hematalogic:  heparin   Injectable 5000 Unit(s) SubCutaneous every 8 hours    Other:  chlorhexidine 0.12% Liquid 15 milliLiter(s) Oral Mucosa every 12 hours  chlorhexidine 2% Cloths 1 Application(s) Topical <User Schedule>  dexMEDEtomidine Infusion 0.2 MICROgram(s)/kG/Hr IV Continuous <Continuous>  fentaNYL   Infusion. 3 MICROgram(s)/kG/Hr IV Continuous <Continuous>  finasteride 5 milliGRAM(s) Oral daily  pantoprazole  Injectable 40 milliGRAM(s) IV Push daily  senna 2 Tablet(s) Oral at bedtime  sodium bicarbonate 650 milliGRAM(s) Oral every 8 hours    ALBUTerol    90 MICROgram(s) HFA Inhaler 2 Puff(s) Inhalation every 6 hours  chlorhexidine 0.12% Liquid 15 milliLiter(s) Oral Mucosa every 12 hours  chlorhexidine 2% Cloths 1 Application(s) Topical <User Schedule>  dexMEDEtomidine Infusion 0.2 MICROgram(s)/kG/Hr IV Continuous <Continuous>  fentaNYL   Infusion. 3 MICROgram(s)/kG/Hr IV Continuous <Continuous>  finasteride 5 milliGRAM(s) Oral daily  heparin   Injectable 5000 Unit(s) SubCutaneous every 8 hours  norepinephrine Infusion 0.85 MICROgram(s)/kG/Min IV Continuous <Continuous>  pantoprazole  Injectable 40 milliGRAM(s) IV Push daily  piperacillin/tazobactam IVPB.. 3.375 Gram(s) IV Intermittent every 12 hours  ranolazine 500 milliGRAM(s) Oral two times a day  senna 2 Tablet(s) Oral at bedtime  sodium bicarbonate 650 milliGRAM(s) Oral every 8 hours  tamsulosin 0.4 milliGRAM(s) Oral at bedtime    Drug Dosing Weight  Height (cm): 177.8 (12 Nov 2020 08:58)  Weight (kg): 76.5 (12 Nov 2020 08:58)  BMI (kg/m2): 24.2 (12 Nov 2020 08:58)  BSA (m2): 1.94 (12 Nov 2020 08:58)    CENTRAL LINE: [ ] YES [ ] NO  LOCATION:         ESCOBAR: [ ] YES [ ] NO          A-LINE:  [ ] YES [ ] NO  LOCATION:             ICU Vital Signs Last 24 Hrs  T(C): 36.3 (16 Nov 2020 07:00), Max: 37.1 (15 Nov 2020 19:45)  T(F): 97.3 (16 Nov 2020 07:00), Max: 98.8 (15 Nov 2020 19:45)  HR: 110 (16 Nov 2020 12:15) (96 - 137)  BP: 100/59 (16 Nov 2020 12:15) (85/44 - 143/82)  BP(mean): 67 (16 Nov 2020 12:15) (53 - 96)  ABP: --  ABP(mean): --  RR: 21 (16 Nov 2020 12:15) (0 - 29)  SpO2: 98% (16 Nov 2020 12:15) (84% - 100%)      ABG - ( 16 Nov 2020 03:53 )  pH, Arterial: 7.29  pH, Blood: x     /  pCO2: 42    /  pO2: 59    / HCO3: 20    / Base Excess: -6.0  /  SaO2: 86                    11-15 @ 07:01  -  11-16 @ 07:00  --------------------------------------------------------  IN: 1605.5 mL / OUT: 1475 mL / NET: 130.5 mL        Mode: AC/ CMV (Assist Control/ Continuous Mandatory Ventilation)  RR (machine): 26  TV (machine): 500  FiO2: 80  PEEP: 10  ITime: 1  MAP: 19  PIP: 31      REVIEW OF SYSTEMS:    CONSTITUTIONAL: No weakness, fevers or chills  NECK: No pain or stiffness  RESPIRATORY: No cough, wheezing, hemoptysis; No shortness of breath  CARDIOVASCULAR: No chest pain or palpitations  GASTROINTESTINAL: No abdominal or epigastric pain. No nausea, vomiting, No diarrhea or constipation. No melena or hematochezia.  GENITOURINARY: No dysuria, frequency or hematuria  NEUROLOGICAL: No numbness or weakness  All other review of systems is negative unless indicated above      PHYSICAL EXAM:    GENERAL: NAD, well-groomed, well-developed  EYES: EOMI, PERRLA,   NECK: Supple, No JVD; Normal thyroid; Trachea midline  NERVOUS SYSTEM:  Alert & Oriented X3,  Motor Strength 5/5 B/L upper and lower extremities; DTRs 2+ intact and symmetric  CHEST/LUNG: No rales, rhonchi, wheezing   HEART: Regular rate and rhythm; No murmurs,   ABDOMEN: Soft, Nontender, Nondistended; Bowel sounds present  EXTREMITIES:  2+ Peripheral Pulses, No clubbing, cyanosis, or edema        LABS:  CBC Full  -  ( 16 Nov 2020 05:45 )  WBC Count : 13.69 K/uL  RBC Count : 2.32 M/uL  Hemoglobin : 7.6 g/dL  Hematocrit : 23.3 %  Platelet Count - Automated : 142 K/uL  Mean Cell Volume : 100.4 fl  Mean Cell Hemoglobin : 32.8 pg  Mean Cell Hemoglobin Concentration : 32.6 gm/dL  Auto Neutrophil # : 12.34 K/uL  Auto Lymphocyte # : 0.56 K/uL  Auto Monocyte # : 0.52 K/uL  Auto Eosinophil # : 0.18 K/uL  Auto Basophil # : 0.01 K/uL  Auto Neutrophil % : 90.1 %  Auto Lymphocyte % : 4.1 %  Auto Monocyte % : 3.8 %  Auto Eosinophil % : 1.3 %  Auto Basophil % : 0.1 %    11-16    137  |  105  |  38<H>  ----------------------------<  116<H>  3.5   |  21<L>  |  4.19<H>    Ca    6.7<L>      16 Nov 2020 05:45  Phos  3.2     11-16  Mg     2.1     11-16    TPro  4.8<L>  /  Alb  1.5<L>  /  TBili  0.8  /  DBili  x   /  AST  74<H>  /  ALT  56  /  AlkPhos  65  11-16    PT/INR - ( 15 Nov 2020 06:45 )   PT: 16.4 sec;   INR: 1.40 ratio         PTT - ( 15 Nov 2020 06:45 )  PTT:35.6 sec    Culture Results:   No growth at 48 hours (11-13 @ 19:06)      RADIOLOGY & ADDITIONAL STUDIES REVIEWED:  ***    [ ]GOALS OF CARE DISCUSSION WITH PATIENT/FAMILY/PROXY:    CRITICAL CARE TIME SPENT: 35 minutes

## 2020-11-16 NOTE — PROGRESS NOTE ADULT - SUBJECTIVE AND OBJECTIVE BOX
Dr. Vaughan  Office [phone]  Cell [phone]  Pallavi GRAJEDA  Cell [phone]    RENAL PROGRESS NOTE: DATE OF SERVICE 11-16-20 @ 14:24    Patient is a 85y old  Male who presents with a chief complaint of Shortness of breath (16 Nov 2020 12:24)      Patient seen and examined at bedside in MICU. Remain intubated    VITALS:  T(F): 97.5 (11-16-20 @ 11:55), Max: 98.8 (11-15-20 @ 19:45)  HR: 122 (11-16-20 @ 12:45)  BP: 121/63 (11-16-20 @ 12:45)  RR: 19 (11-16-20 @ 12:45)  SpO2: 95% (11-16-20 @ 12:45)  Wt(kg): --    11-15 @ 07:01  -  11-16 @ 07:00  --------------------------------------------------------  IN: 1605.5 mL / OUT: 1475 mL / NET: 130.5 mL    11-16 @ 07:01  -  11-16 @ 14:24  --------------------------------------------------------  IN: 158 mL / OUT: 0 mL / NET: 158 mL          PHYSICAL EXAM:  Constitutional: Intubated on O2 60%  Neck: No JVD  Respiratory: Bilateral basal crackles+  Cardiovascular: S1, S2, RRR  Gastrointestinal: BS+, soft, NT/ND  Extremities: 3-4+ peripheral edema    Hospital Medications:   MEDICATIONS  (STANDING):  ALBUTerol    90 MICROgram(s) HFA Inhaler 2 Puff(s) Inhalation every 6 hours  chlorhexidine 0.12% Liquid 15 milliLiter(s) Oral Mucosa every 12 hours  chlorhexidine 2% Cloths 1 Application(s) Topical <User Schedule>  dexMEDEtomidine Infusion 0.2 MICROgram(s)/kG/Hr (3.83 mL/Hr) IV Continuous <Continuous>  fentaNYL   Infusion. 3 MICROgram(s)/kG/Hr (23 mL/Hr) IV Continuous <Continuous>  finasteride 5 milliGRAM(s) Oral daily  heparin   Injectable 5000 Unit(s) SubCutaneous every 8 hours  norepinephrine Infusion 0.85 MICROgram(s)/kG/Min (61 mL/Hr) IV Continuous <Continuous>  pantoprazole  Injectable 40 milliGRAM(s) IV Push daily  piperacillin/tazobactam IVPB.. 3.375 Gram(s) IV Intermittent every 12 hours  ranolazine 500 milliGRAM(s) Oral two times a day  senna 2 Tablet(s) Oral at bedtime  sodium bicarbonate 650 milliGRAM(s) Oral every 8 hours  tamsulosin 0.4 milliGRAM(s) Oral at bedtime      LABS:  11-16    137  |  105  |  38<H>  ----------------------------<  116<H>  3.5   |  21<L>  |  4.19<H>    Ca    6.7<L>      16 Nov 2020 05:45  Phos  3.2     11-16  Mg     2.1     11-16    TPro  4.8<L>  /  Alb  1.5<L>  /  TBili  0.8  /  DBili      /  AST  74<H>  /  ALT  56  /  AlkPhos  65  11-16    Creatinine Trend: 4.19 <--, 2.77 <--, 3.51 <--, 2.97 <--, 4.21 <--, 3.27 <--, 3.03 <--, 5.96 <--, 5.37 <--, 5.60 <--    Albumin, Serum: 1.5 g/dL (11-16 @ 05:45)  Phosphorus Level, Serum: 3.2 mg/dL (11-16 @ 05:45)                              7.6    13.69 )-----------( 142      ( 16 Nov 2020 05:45 )             23.3     Urine Studies:  Urinalysis - [11-12-20 @ 18:04]      Color Yellow / Appearance Clear / SG 1.025 / pH 5.0      Gluc Negative / Ketone Negative  / Bili Negative / Urobili Negative       Blood Moderate / Protein 100 / Leuk Est Trace / Nitrite Negative      RBC 5-10 / WBC 3-5 / Hyaline 0-2 / Gran 5-10 / Sq Epi  / Non Sq Epi Few / Bacteria Few    Urine Creatinine 88      [11-13-20 @ 13:49]  Urine Protein 218      [11-13-20 @ 13:49]  Urine Sodium 41      [11-13-20 @ 13:49]  Urine Osmolality 284      [11-13-20 @ 13:49]    Iron 24, TIBC 142, %sat 17      [11-13-20 @ 13:38]  TSH 2.01      [11-13-20 @ 06:39]  Lipid: chol 172, TG 83, HDL 46, LDL --      [11-13-20 @ 06:39]    HBsAg Nonreact      [11-14-20 @ 10:25]  HCV 0.15, Nonreact      [11-14-20 @ 10:25]    JAI: titer Negative, pattern --      [11-13-20 @ 20:35]  C3 Complement 52      [11-13-20 @ 22:30]  C4 Complement 20      [11-13-20 @ 22:30]  ANCA: cANCA Negative, pANCA Negative, atypical ANCA Indeterminate      [11-13-20 @ 20:35]  anti-GBM <1.0      [11-13-20 @ 21:37]  Syphilis Screen (Treponema Pallidum Ab) Negative      [11-13-20 @ 20:35]  Free Light Chains: kappa 14.62, lambda 10.97, ratio = 1.33      [11-13 @ 22:30]    RADIOLOGY & ADDITIONAL STUDIES:

## 2020-11-16 NOTE — CHART NOTE - TREATMENT: THE FOLLOWING DIET HAS BEEN RECOMMENDED
Diet, NPO with Tube Feed:   Tube Feeding Modality: Nasogastric  Nepro with Carb Steady  Total Volume for 24 Hours (mL): 960  Continuous  Starting Tube Feed Rate {mL per Hour}: 10  Increase Tube Feed Rate by (mL): 10     Every 2 hours  Until Goal Tube Feed Rate (mL per Hour): 40  Tube Feed Duration (in Hours): 24  Tube Feed Start Time: 23:59 (11-14-20 @ 23:24) [Active]

## 2020-11-16 NOTE — PROGRESS NOTE ADULT - ASSESSMENT
ASSESSMENT AND PLAN:  Pt is 86 yo male with PMHx of CAD with stent, Afib on Eliquis, HTN, HLD, Gout who presented with weakness and shortness of breath. Pt is being admitted to ICU for hypoxic respiratory failure requiring BIPAP support.    1.Hypoxic Resp failure due to pneumonia and fluid overload  2.CKD  3.Acute encephalopathy  4.Afib on Eliquis  5.HTN  6.BPH  7.Anemia    Neuro  Patient intubated thus morning ( 11/13/2020)  sedated on propofol and fentanyl.    -Acute Encephalopathy vs worsening dementia  CT head on admission negative for any acute stroke  BUN elevated to 51  Monitor mental status closely    Cardiovascular:  Patient off anticoagulation for catheter placement,  will restart anticoagulation once catheter is in.,  Patient on 2 vasopressors to maintain MAP of > 65  levophed and vasopressin    CAD  Pt on eliquis at home  Pt with recent echo done on 10/22 which showed normal EF, mild-mod MR and mild tricuspid regurgitation  EKG with Afib, no acute ischemic changes noted    Acute Pulmonary edema  Likely secondary to fluid overload from worsening CKD  Pt s/p 2L ivf in ED  Patient was started on lasix 80 bid.  Urology consulted    Pulmonary  Patient intubated on 11/13/2020 for hypoxia   -Acute hypoxic respiratory failure s/s to Pneumonia and fluid overload       Infections  -Pneumonia  Will continue with broad spectrum abx  F/u blood cultures  Covid negative  F/u RVP and repeat covid in 24 hours    Nephro  -Progressive CKD  Pt with baseline Cr of around 2.7 05/20 with prior h/o post infectious GN  Pt Cr 5 on last admission.  Patient has severe metabolic acidosis.  Patient started on PO sodium Bicarb  Patient was started  on lasix 80mg with minimal urine out put so it was doscontinued .   Shiley was placed patient went for dialysis .   Strict I/O monitoring  Dr Caputo on board.      Gastrointestinal  -Patient started on tube feeds.    Heme  -baseline around 8.7 as per documentation  Patient received 2 PRBC on 11/14/2020.  post transfusion CBC  noted .  heparin drip on hold.    Skin/Catheters  -No acute issues    Prophylaxis   -will start heparin drip. but h/h unstable   -pantoprazole for GI ppx ASSESSMENT AND PLAN:  Pt is 84 yo male with PMHx of CAD with stent, Afib on Eliquis, HTN, HLD, Gout who presented with weakness and shortness of breath. Pt is being admitted to ICU for hypoxic respiratory failure requiring BIPAP support.    1.Hypoxic Resp failure due to pneumonia and fluid overload  2.CKD  3.Acute encephalopathy  4.Afib on Eliquis  5.HTN  6.BPH  7.Anemia    Neuro  Patient intubated thus morning ( 11/13/2020)  sedated on prCEDAX  and fentanyl.    -Acute Encephalopathy vs worsening dementia  CT head on admission negative for any acute stroke    Cardiovascular:  Pt has PMH of AFIB.   Pt was on heparin drip that was held due to hematuria.   Pt on NE    CAD  Pt on eliquis at home  EKG with Afib, no acute ischemic changes noted    Acute Pulmonary edema  Likely secondary to fluid overload from worsening CKD  Pt with recent echo done on 10/22 which showed normal EF, mild-mod MR and mild tricuspid regurgitation    Pulmonary  Patient intubated on 11/13/2020 for hypoxia   -Acute hypoxic respiratory failure s/s to Pneumonia and fluid overload   fio2 Increased from 60--> 80       Infections  -Pneumonia  Will continue with broad spectrum abx-Zosyn   BC, UC, Sputum CX no growth   Covid negative    Nephro  -Progressive CKD  Pt with baseline Cr of around 2.7 05/20 with prior h/o post infectious GN  ABG - 7.29/42/59/20  Metabolic acidosis resolving  c/w PO sodium Bicarb  - HD today   Strict I/O monitoring  Dr Caputo on board.      Gastrointestinal  -Patient started on tube feeds.    Heme  Hb today 7.6   Patient already received  2 PRBC on 11/14/2020.  Pt got 3rd PRBC today during HD  F/U post PRBC CBC   heparin drip on hold.    Skin/Catheters  -No acute issues    Prophylaxis   -will start heparin drip. but h/h unstable   -pantoprazole for GI ppx

## 2020-11-16 NOTE — PROGRESS NOTE ADULT - SUBJECTIVE AND OBJECTIVE BOX
INTERVAL HPI/OVERNIGHT EVENTS: ***    PRESSORS: [ ] YES [ ] NO  WHICH:    ANTIBIOTICS:                  DATE STARTED:  ANTIBIOTICS:                  DATE STARTED:  ANTIBIOTICS:                  DATE STARTED:    Antimicrobial:  piperacillin/tazobactam IVPB.. 3.375 Gram(s) IV Intermittent every 12 hours    Cardiovascular:  norepinephrine Infusion 0.85 MICROgram(s)/kG/Min IV Continuous <Continuous>  ranolazine 500 milliGRAM(s) Oral two times a day  tamsulosin 0.4 milliGRAM(s) Oral at bedtime    Pulmonary:  ALBUTerol    90 MICROgram(s) HFA Inhaler 2 Puff(s) Inhalation every 6 hours    Hematalogic:  heparin   Injectable 5000 Unit(s) SubCutaneous every 8 hours    Other:  chlorhexidine 0.12% Liquid 15 milliLiter(s) Oral Mucosa every 12 hours  chlorhexidine 2% Cloths 1 Application(s) Topical <User Schedule>  dexMEDEtomidine Infusion 0.2 MICROgram(s)/kG/Hr IV Continuous <Continuous>  fentaNYL   Infusion. 3 MICROgram(s)/kG/Hr IV Continuous <Continuous>  finasteride 5 milliGRAM(s) Oral daily  pantoprazole  Injectable 40 milliGRAM(s) IV Push daily  senna 2 Tablet(s) Oral at bedtime  sodium bicarbonate 650 milliGRAM(s) Oral every 8 hours    ALBUTerol    90 MICROgram(s) HFA Inhaler 2 Puff(s) Inhalation every 6 hours  chlorhexidine 0.12% Liquid 15 milliLiter(s) Oral Mucosa every 12 hours  chlorhexidine 2% Cloths 1 Application(s) Topical <User Schedule>  dexMEDEtomidine Infusion 0.2 MICROgram(s)/kG/Hr IV Continuous <Continuous>  fentaNYL   Infusion. 3 MICROgram(s)/kG/Hr IV Continuous <Continuous>  finasteride 5 milliGRAM(s) Oral daily  heparin   Injectable 5000 Unit(s) SubCutaneous every 8 hours  norepinephrine Infusion 0.85 MICROgram(s)/kG/Min IV Continuous <Continuous>  pantoprazole  Injectable 40 milliGRAM(s) IV Push daily  piperacillin/tazobactam IVPB.. 3.375 Gram(s) IV Intermittent every 12 hours  ranolazine 500 milliGRAM(s) Oral two times a day  senna 2 Tablet(s) Oral at bedtime  sodium bicarbonate 650 milliGRAM(s) Oral every 8 hours  tamsulosin 0.4 milliGRAM(s) Oral at bedtime    Drug Dosing Weight  Height (cm): 177.8 (12 Nov 2020 08:58)  Weight (kg): 76.5 (12 Nov 2020 08:58)  BMI (kg/m2): 24.2 (12 Nov 2020 08:58)  BSA (m2): 1.94 (12 Nov 2020 08:58)    CENTRAL LINE: [ ] YES [ ] NO  LOCATION:         ESCOBAR: [ ] YES [ ] NO          A-LINE:  [ ] YES [ ] NO  LOCATION:             ICU Vital Signs Last 24 Hrs  T(C): 36.3 (16 Nov 2020 07:00), Max: 37.1 (15 Nov 2020 19:45)  T(F): 97.3 (16 Nov 2020 07:00), Max: 98.8 (15 Nov 2020 19:45)  HR: 110 (16 Nov 2020 12:15) (96 - 137)  BP: 100/59 (16 Nov 2020 12:15) (85/44 - 143/82)  BP(mean): 67 (16 Nov 2020 12:15) (53 - 96)  ABP: --  ABP(mean): --  RR: 21 (16 Nov 2020 12:15) (0 - 29)  SpO2: 98% (16 Nov 2020 12:15) (84% - 100%)      ABG - ( 16 Nov 2020 03:53 )  pH, Arterial: 7.29  pH, Blood: x     /  pCO2: 42    /  pO2: 59    / HCO3: 20    / Base Excess: -6.0  /  SaO2: 86                    11-15 @ 07:01  -  11-16 @ 07:00  --------------------------------------------------------  IN: 1605.5 mL / OUT: 1475 mL / NET: 130.5 mL        Mode: AC/ CMV (Assist Control/ Continuous Mandatory Ventilation)  RR (machine): 26  TV (machine): 500  FiO2: 80  PEEP: 10  ITime: 1  MAP: 19  PIP: 31      REVIEW OF SYSTEMS:    CONSTITUTIONAL: No weakness, fevers or chills  NECK: No pain or stiffness  RESPIRATORY: No cough, wheezing, hemoptysis; No shortness of breath  CARDIOVASCULAR: No chest pain or palpitations  GASTROINTESTINAL: No abdominal or epigastric pain. No nausea, vomiting, No diarrhea or constipation. No melena or hematochezia.  GENITOURINARY: No dysuria, frequency or hematuria  NEUROLOGICAL: No numbness or weakness  All other review of systems is negative unless indicated above      PHYSICAL EXAM:    GENERAL: NAD, well-groomed, well-developed  EYES: EOMI, PERRLA,   NECK: Supple, No JVD; Normal thyroid; Trachea midline  NERVOUS SYSTEM:  Alert & Oriented X3,  Motor Strength 5/5 B/L upper and lower extremities; DTRs 2+ intact and symmetric  CHEST/LUNG: No rales, rhonchi, wheezing   HEART: Regular rate and rhythm; No murmurs,   ABDOMEN: Soft, Nontender, Nondistended; Bowel sounds present  EXTREMITIES:  2+ Peripheral Pulses, No clubbing, cyanosis, or edema        LABS:  CBC Full  -  ( 16 Nov 2020 05:45 )  WBC Count : 13.69 K/uL  RBC Count : 2.32 M/uL  Hemoglobin : 7.6 g/dL  Hematocrit : 23.3 %  Platelet Count - Automated : 142 K/uL  Mean Cell Volume : 100.4 fl  Mean Cell Hemoglobin : 32.8 pg  Mean Cell Hemoglobin Concentration : 32.6 gm/dL  Auto Neutrophil # : 12.34 K/uL  Auto Lymphocyte # : 0.56 K/uL  Auto Monocyte # : 0.52 K/uL  Auto Eosinophil # : 0.18 K/uL  Auto Basophil # : 0.01 K/uL  Auto Neutrophil % : 90.1 %  Auto Lymphocyte % : 4.1 %  Auto Monocyte % : 3.8 %  Auto Eosinophil % : 1.3 %  Auto Basophil % : 0.1 %    11-16    137  |  105  |  38<H>  ----------------------------<  116<H>  3.5   |  21<L>  |  4.19<H>    Ca    6.7<L>      16 Nov 2020 05:45  Phos  3.2     11-16  Mg     2.1     11-16    TPro  4.8<L>  /  Alb  1.5<L>  /  TBili  0.8  /  DBili  x   /  AST  74<H>  /  ALT  56  /  AlkPhos  65  11-16    PT/INR - ( 15 Nov 2020 06:45 )   PT: 16.4 sec;   INR: 1.40 ratio         PTT - ( 15 Nov 2020 06:45 )  PTT:35.6 sec    Culture Results:   No growth at 48 hours (11-13 @ 19:06)      RADIOLOGY & ADDITIONAL STUDIES REVIEWED:  ***    [ ]GOALS OF CARE DISCUSSION WITH PATIENT/FAMILY/PROXY:    CRITICAL CARE TIME SPENT: 35 minutes INTERVAL HPI/OVERNIGHT EVENTS: Pt getting HD today. Hb noted to be low 7.6. Pt got 1PRBC during the HD. Hematuria noted to b clearing. Pt started on bowel     PRESSORS: [ ] YES [ ] NO  WHICH:    ANTIBIOTICS:                  DATE STARTED:  ANTIBIOTICS:                  DATE STARTED:  ANTIBIOTICS:                  DATE STARTED:    Antimicrobial:  piperacillin/tazobactam IVPB.. 3.375 Gram(s) IV Intermittent every 12 hours    Cardiovascular:  norepinephrine Infusion 0.85 MICROgram(s)/kG/Min IV Continuous <Continuous>  ranolazine 500 milliGRAM(s) Oral two times a day  tamsulosin 0.4 milliGRAM(s) Oral at bedtime    Pulmonary:  ALBUTerol    90 MICROgram(s) HFA Inhaler 2 Puff(s) Inhalation every 6 hours    Hematalogic:  heparin   Injectable 5000 Unit(s) SubCutaneous every 8 hours    Other:  chlorhexidine 0.12% Liquid 15 milliLiter(s) Oral Mucosa every 12 hours  chlorhexidine 2% Cloths 1 Application(s) Topical <User Schedule>  dexMEDEtomidine Infusion 0.2 MICROgram(s)/kG/Hr IV Continuous <Continuous>  fentaNYL   Infusion. 3 MICROgram(s)/kG/Hr IV Continuous <Continuous>  finasteride 5 milliGRAM(s) Oral daily  pantoprazole  Injectable 40 milliGRAM(s) IV Push daily  senna 2 Tablet(s) Oral at bedtime  sodium bicarbonate 650 milliGRAM(s) Oral every 8 hours    ALBUTerol    90 MICROgram(s) HFA Inhaler 2 Puff(s) Inhalation every 6 hours  chlorhexidine 0.12% Liquid 15 milliLiter(s) Oral Mucosa every 12 hours  chlorhexidine 2% Cloths 1 Application(s) Topical <User Schedule>  dexMEDEtomidine Infusion 0.2 MICROgram(s)/kG/Hr IV Continuous <Continuous>  fentaNYL   Infusion. 3 MICROgram(s)/kG/Hr IV Continuous <Continuous>  finasteride 5 milliGRAM(s) Oral daily  heparin   Injectable 5000 Unit(s) SubCutaneous every 8 hours  norepinephrine Infusion 0.85 MICROgram(s)/kG/Min IV Continuous <Continuous>  pantoprazole  Injectable 40 milliGRAM(s) IV Push daily  piperacillin/tazobactam IVPB.. 3.375 Gram(s) IV Intermittent every 12 hours  ranolazine 500 milliGRAM(s) Oral two times a day  senna 2 Tablet(s) Oral at bedtime  sodium bicarbonate 650 milliGRAM(s) Oral every 8 hours  tamsulosin 0.4 milliGRAM(s) Oral at bedtime    Drug Dosing Weight  Height (cm): 177.8 (12 Nov 2020 08:58)  Weight (kg): 76.5 (12 Nov 2020 08:58)  BMI (kg/m2): 24.2 (12 Nov 2020 08:58)  BSA (m2): 1.94 (12 Nov 2020 08:58)    CENTRAL LINE: [ ] YES [ ] NO  LOCATION:         ESCOBAR: [ ] YES [ ] NO          A-LINE:  [ ] YES [ ] NO  LOCATION:             ICU Vital Signs Last 24 Hrs  T(C): 36.3 (16 Nov 2020 07:00), Max: 37.1 (15 Nov 2020 19:45)  T(F): 97.3 (16 Nov 2020 07:00), Max: 98.8 (15 Nov 2020 19:45)  HR: 110 (16 Nov 2020 12:15) (96 - 137)  BP: 100/59 (16 Nov 2020 12:15) (85/44 - 143/82)  BP(mean): 67 (16 Nov 2020 12:15) (53 - 96)  ABP: --  ABP(mean): --  RR: 21 (16 Nov 2020 12:15) (0 - 29)  SpO2: 98% (16 Nov 2020 12:15) (84% - 100%)      ABG - ( 16 Nov 2020 03:53 )  pH, Arterial: 7.29  pH, Blood: x     /  pCO2: 42    /  pO2: 59    / HCO3: 20    / Base Excess: -6.0  /  SaO2: 86                    11-15 @ 07:01  -  11-16 @ 07:00  --------------------------------------------------------  IN: 1605.5 mL / OUT: 1475 mL / NET: 130.5 mL        Mode: AC/ CMV (Assist Control/ Continuous Mandatory Ventilation)  RR (machine): 26  TV (machine): 500  FiO2: 80  PEEP: 10  ITime: 1  MAP: 19  PIP: 31      REVIEW OF SYSTEMS:    CONSTITUTIONAL: No weakness, fevers or chills  NECK: No pain or stiffness  RESPIRATORY: No cough, wheezing, hemoptysis; No shortness of breath  CARDIOVASCULAR: No chest pain or palpitations  GASTROINTESTINAL: No abdominal or epigastric pain. No nausea, vomiting, No diarrhea or constipation. No melena or hematochezia.  GENITOURINARY: No dysuria, frequency or hematuria  NEUROLOGICAL: No numbness or weakness  All other review of systems is negative unless indicated above      PHYSICAL EXAM:    GENERAL: NAD, well-groomed, well-developed  EYES: EOMI, PERRLA,   NECK: Supple, No JVD; Normal thyroid; Trachea midline  NERVOUS SYSTEM:  Alert & Oriented X3,  Motor Strength 5/5 B/L upper and lower extremities; DTRs 2+ intact and symmetric  CHEST/LUNG: No rales, rhonchi, wheezing   HEART: Regular rate and rhythm; No murmurs,   ABDOMEN: Soft, Nontender, Nondistended; Bowel sounds present  EXTREMITIES:  2+ Peripheral Pulses, No clubbing, cyanosis, or edema        LABS:  CBC Full  -  ( 16 Nov 2020 05:45 )  WBC Count : 13.69 K/uL  RBC Count : 2.32 M/uL  Hemoglobin : 7.6 g/dL  Hematocrit : 23.3 %  Platelet Count - Automated : 142 K/uL  Mean Cell Volume : 100.4 fl  Mean Cell Hemoglobin : 32.8 pg  Mean Cell Hemoglobin Concentration : 32.6 gm/dL  Auto Neutrophil # : 12.34 K/uL  Auto Lymphocyte # : 0.56 K/uL  Auto Monocyte # : 0.52 K/uL  Auto Eosinophil # : 0.18 K/uL  Auto Basophil # : 0.01 K/uL  Auto Neutrophil % : 90.1 %  Auto Lymphocyte % : 4.1 %  Auto Monocyte % : 3.8 %  Auto Eosinophil % : 1.3 %  Auto Basophil % : 0.1 %    11-16    137  |  105  |  38<H>  ----------------------------<  116<H>  3.5   |  21<L>  |  4.19<H>    Ca    6.7<L>      16 Nov 2020 05:45  Phos  3.2     11-16  Mg     2.1     11-16    TPro  4.8<L>  /  Alb  1.5<L>  /  TBili  0.8  /  DBili  x   /  AST  74<H>  /  ALT  56  /  AlkPhos  65  11-16    PT/INR - ( 15 Nov 2020 06:45 )   PT: 16.4 sec;   INR: 1.40 ratio         PTT - ( 15 Nov 2020 06:45 )  PTT:35.6 sec    Culture Results:   No growth at 48 hours (11-13 @ 19:06)      RADIOLOGY & ADDITIONAL STUDIES REVIEWED:  ***    [ ]GOALS OF CARE DISCUSSION WITH PATIENT/FAMILY/PROXY:    CRITICAL CARE TIME SPENT: 35 minutes INTERVAL HPI/OVERNIGHT EVENTS: Pt getting HD today. Hb noted to be low 7.6. Pt got 1PRBC during the HD. Hematuria noted to b clearing. Pt started on bowel regimen     PRESSORS: [ ] YES [ ] NO  WHICH:    ANTIBIOTICS:                  DATE STARTED:  ANTIBIOTICS:                  DATE STARTED:  ANTIBIOTICS:                  DATE STARTED:    Antimicrobial:  piperacillin/tazobactam IVPB.. 3.375 Gram(s) IV Intermittent every 12 hours    Cardiovascular:  norepinephrine Infusion 0.85 MICROgram(s)/kG/Min IV Continuous <Continuous>  ranolazine 500 milliGRAM(s) Oral two times a day  tamsulosin 0.4 milliGRAM(s) Oral at bedtime    Pulmonary:  ALBUTerol    90 MICROgram(s) HFA Inhaler 2 Puff(s) Inhalation every 6 hours    Hematalogic:  heparin   Injectable 5000 Unit(s) SubCutaneous every 8 hours    Other:  chlorhexidine 0.12% Liquid 15 milliLiter(s) Oral Mucosa every 12 hours  chlorhexidine 2% Cloths 1 Application(s) Topical <User Schedule>  dexMEDEtomidine Infusion 0.2 MICROgram(s)/kG/Hr IV Continuous <Continuous>  fentaNYL   Infusion. 3 MICROgram(s)/kG/Hr IV Continuous <Continuous>  finasteride 5 milliGRAM(s) Oral daily  pantoprazole  Injectable 40 milliGRAM(s) IV Push daily  senna 2 Tablet(s) Oral at bedtime  sodium bicarbonate 650 milliGRAM(s) Oral every 8 hours    ALBUTerol    90 MICROgram(s) HFA Inhaler 2 Puff(s) Inhalation every 6 hours  chlorhexidine 0.12% Liquid 15 milliLiter(s) Oral Mucosa every 12 hours  chlorhexidine 2% Cloths 1 Application(s) Topical <User Schedule>  dexMEDEtomidine Infusion 0.2 MICROgram(s)/kG/Hr IV Continuous <Continuous>  fentaNYL   Infusion. 3 MICROgram(s)/kG/Hr IV Continuous <Continuous>  finasteride 5 milliGRAM(s) Oral daily  heparin   Injectable 5000 Unit(s) SubCutaneous every 8 hours  norepinephrine Infusion 0.85 MICROgram(s)/kG/Min IV Continuous <Continuous>  pantoprazole  Injectable 40 milliGRAM(s) IV Push daily  piperacillin/tazobactam IVPB.. 3.375 Gram(s) IV Intermittent every 12 hours  ranolazine 500 milliGRAM(s) Oral two times a day  senna 2 Tablet(s) Oral at bedtime  sodium bicarbonate 650 milliGRAM(s) Oral every 8 hours  tamsulosin 0.4 milliGRAM(s) Oral at bedtime    Drug Dosing Weight  Height (cm): 177.8 (12 Nov 2020 08:58)  Weight (kg): 76.5 (12 Nov 2020 08:58)  BMI (kg/m2): 24.2 (12 Nov 2020 08:58)  BSA (m2): 1.94 (12 Nov 2020 08:58)    CENTRAL LINE: [ ] YES [ ] NO  LOCATION:         ESCOBAR: [ ] YES [ ] NO          A-LINE:  [ ] YES [ ] NO  LOCATION:             ICU Vital Signs Last 24 Hrs  T(C): 36.3 (16 Nov 2020 07:00), Max: 37.1 (15 Nov 2020 19:45)  T(F): 97.3 (16 Nov 2020 07:00), Max: 98.8 (15 Nov 2020 19:45)  HR: 110 (16 Nov 2020 12:15) (96 - 137)  BP: 100/59 (16 Nov 2020 12:15) (85/44 - 143/82)  BP(mean): 67 (16 Nov 2020 12:15) (53 - 96)  ABP: --  ABP(mean): --  RR: 21 (16 Nov 2020 12:15) (0 - 29)  SpO2: 98% (16 Nov 2020 12:15) (84% - 100%)      ABG - ( 16 Nov 2020 03:53 )  pH, Arterial: 7.29  pH, Blood: x     /  pCO2: 42    /  pO2: 59    / HCO3: 20    / Base Excess: -6.0  /  SaO2: 86                    11-15 @ 07:01  -  11-16 @ 07:00  --------------------------------------------------------  IN: 1605.5 mL / OUT: 1475 mL / NET: 130.5 mL        Mode: AC/ CMV (Assist Control/ Continuous Mandatory Ventilation)  RR (machine): 26  TV (machine): 500  FiO2: 80  PEEP: 10  ITime: 1  MAP: 19  PIP: 31      REVIEW OF SYSTEMS:    CONSTITUTIONAL: No weakness, fevers or chills  NECK: No pain or stiffness  RESPIRATORY: No cough, wheezing, hemoptysis; No shortness of breath  CARDIOVASCULAR: No chest pain or palpitations  GASTROINTESTINAL: No abdominal or epigastric pain. No nausea, vomiting, No diarrhea or constipation. No melena or hematochezia.  GENITOURINARY: No dysuria, frequency or hematuria  NEUROLOGICAL: No numbness or weakness  All other review of systems is negative unless indicated above      PHYSICAL EXAM:    GENERAL: NAD, well-groomed, well-developed  EYES: EOMI, PERRLA,   NECK: Supple, No JVD; Normal thyroid; Trachea midline  NERVOUS SYSTEM:  Alert & Oriented X3,  Motor Strength 5/5 B/L upper and lower extremities; DTRs 2+ intact and symmetric  CHEST/LUNG: No rales, rhonchi, wheezing   HEART: Regular rate and rhythm; No murmurs,   ABDOMEN: Soft, Nontender, Nondistended; Bowel sounds present  EXTREMITIES:  2+ Peripheral Pulses, No clubbing, cyanosis, or edema        LABS:  CBC Full  -  ( 16 Nov 2020 05:45 )  WBC Count : 13.69 K/uL  RBC Count : 2.32 M/uL  Hemoglobin : 7.6 g/dL  Hematocrit : 23.3 %  Platelet Count - Automated : 142 K/uL  Mean Cell Volume : 100.4 fl  Mean Cell Hemoglobin : 32.8 pg  Mean Cell Hemoglobin Concentration : 32.6 gm/dL  Auto Neutrophil # : 12.34 K/uL  Auto Lymphocyte # : 0.56 K/uL  Auto Monocyte # : 0.52 K/uL  Auto Eosinophil # : 0.18 K/uL  Auto Basophil # : 0.01 K/uL  Auto Neutrophil % : 90.1 %  Auto Lymphocyte % : 4.1 %  Auto Monocyte % : 3.8 %  Auto Eosinophil % : 1.3 %  Auto Basophil % : 0.1 %    11-16    137  |  105  |  38<H>  ----------------------------<  116<H>  3.5   |  21<L>  |  4.19<H>    Ca    6.7<L>      16 Nov 2020 05:45  Phos  3.2     11-16  Mg     2.1     11-16    TPro  4.8<L>  /  Alb  1.5<L>  /  TBili  0.8  /  DBili  x   /  AST  74<H>  /  ALT  56  /  AlkPhos  65  11-16    PT/INR - ( 15 Nov 2020 06:45 )   PT: 16.4 sec;   INR: 1.40 ratio         PTT - ( 15 Nov 2020 06:45 )  PTT:35.6 sec    Culture Results:   No growth at 48 hours (11-13 @ 19:06)      RADIOLOGY & ADDITIONAL STUDIES REVIEWED:  ***    [ ]GOALS OF CARE DISCUSSION WITH PATIENT/FAMILY/PROXY:    CRITICAL CARE TIME SPENT: 35 minutes INTERVAL HPI/OVERNIGHT EVENTS: Pt getting HD today. Hb noted to be low 7.6. Pt got 1PRBC during the HD. Hematuria noted to b clearing. Pt started on bowel regimen     PRESSORS: [ x] YES [ ] NO  WHICH: NE     ANTIBIOTICS:                  DATE STARTED:  ANTIBIOTICS:                  DATE STARTED:  ANTIBIOTICS:                  DATE STARTED:    Antimicrobial:  piperacillin/tazobactam IVPB.. 3.375 Gram(s) IV Intermittent every 12 hours    Cardiovascular:  norepinephrine Infusion 0.85 MICROgram(s)/kG/Min IV Continuous <Continuous>  ranolazine 500 milliGRAM(s) Oral two times a day  tamsulosin 0.4 milliGRAM(s) Oral at bedtime    Pulmonary:  ALBUTerol    90 MICROgram(s) HFA Inhaler 2 Puff(s) Inhalation every 6 hours    Hematalogic:  heparin   Injectable 5000 Unit(s) SubCutaneous every 8 hours    Other:  chlorhexidine 0.12% Liquid 15 milliLiter(s) Oral Mucosa every 12 hours  chlorhexidine 2% Cloths 1 Application(s) Topical <User Schedule>  dexMEDEtomidine Infusion 0.2 MICROgram(s)/kG/Hr IV Continuous <Continuous>  fentaNYL   Infusion. 3 MICROgram(s)/kG/Hr IV Continuous <Continuous>  finasteride 5 milliGRAM(s) Oral daily  pantoprazole  Injectable 40 milliGRAM(s) IV Push daily  senna 2 Tablet(s) Oral at bedtime  sodium bicarbonate 650 milliGRAM(s) Oral every 8 hours    ALBUTerol    90 MICROgram(s) HFA Inhaler 2 Puff(s) Inhalation every 6 hours  chlorhexidine 0.12% Liquid 15 milliLiter(s) Oral Mucosa every 12 hours  chlorhexidine 2% Cloths 1 Application(s) Topical <User Schedule>  dexMEDEtomidine Infusion 0.2 MICROgram(s)/kG/Hr IV Continuous <Continuous>  fentaNYL   Infusion. 3 MICROgram(s)/kG/Hr IV Continuous <Continuous>  finasteride 5 milliGRAM(s) Oral daily  heparin   Injectable 5000 Unit(s) SubCutaneous every 8 hours  norepinephrine Infusion 0.85 MICROgram(s)/kG/Min IV Continuous <Continuous>  pantoprazole  Injectable 40 milliGRAM(s) IV Push daily  piperacillin/tazobactam IVPB.. 3.375 Gram(s) IV Intermittent every 12 hours  ranolazine 500 milliGRAM(s) Oral two times a day  senna 2 Tablet(s) Oral at bedtime  sodium bicarbonate 650 milliGRAM(s) Oral every 8 hours  tamsulosin 0.4 milliGRAM(s) Oral at bedtime    Drug Dosing Weight  Height (cm): 177.8 (12 Nov 2020 08:58)  Weight (kg): 76.5 (12 Nov 2020 08:58)  BMI (kg/m2): 24.2 (12 Nov 2020 08:58)  BSA (m2): 1.94 (12 Nov 2020 08:58)    CENTRAL LINE: [ ] YES [ ] NO  LOCATION:         ESCOBAR: [ ] YES [ ] NO          A-LINE:  [ ] YES [ ] NO  LOCATION:             ICU Vital Signs Last 24 Hrs  T(C): 36.3 (16 Nov 2020 07:00), Max: 37.1 (15 Nov 2020 19:45)  T(F): 97.3 (16 Nov 2020 07:00), Max: 98.8 (15 Nov 2020 19:45)  HR: 110 (16 Nov 2020 12:15) (96 - 137)  BP: 100/59 (16 Nov 2020 12:15) (85/44 - 143/82)  BP(mean): 67 (16 Nov 2020 12:15) (53 - 96)  ABP: --  ABP(mean): --  RR: 21 (16 Nov 2020 12:15) (0 - 29)  SpO2: 98% (16 Nov 2020 12:15) (84% - 100%)      ABG - ( 16 Nov 2020 03:53 )  pH, Arterial: 7.29  pH, Blood: x     /  pCO2: 42    /  pO2: 59    / HCO3: 20    / Base Excess: -6.0  /  SaO2: 86                    11-15 @ 07:01  -  11-16 @ 07:00  --------------------------------------------------------  IN: 1605.5 mL / OUT: 1475 mL / NET: 130.5 mL        Mode: AC/ CMV (Assist Control/ Continuous Mandatory Ventilation)  RR (machine): 26  TV (machine): 500  FiO2: 80  PEEP: 10  ITime: 1  MAP: 19  PIP: 31      REVIEW OF SYSTEMS:    COULD NOT BE OBTAINED AS PT IS SEDATED FOR ETT      PHYSICAL EXAM:    GENERAL: Elderly male, sedated for ETT   EYES: Symm pupillary reaction.   NECK: Supple, No JVD; Normal thyroid; Trachea midline  NERVOUS SYSTEM:  pt sedated for ETT   CHEST/LUNG: Clear Lung sounds   HEART: sinus tachycardia   ABDOMEN: Soft, Nontender, Nondistended; Bowel sounds present  EXTREMITIES:  2+ Peripheral Pulses, No clubbing, cyanosis, or edema  SKIN- no changes         LABS:  CBC Full  -  ( 16 Nov 2020 05:45 )  WBC Count : 13.69 K/uL  RBC Count : 2.32 M/uL  Hemoglobin : 7.6 g/dL  Hematocrit : 23.3 %  Platelet Count - Automated : 142 K/uL  Mean Cell Volume : 100.4 fl  Mean Cell Hemoglobin : 32.8 pg  Mean Cell Hemoglobin Concentration : 32.6 gm/dL  Auto Neutrophil # : 12.34 K/uL  Auto Lymphocyte # : 0.56 K/uL  Auto Monocyte # : 0.52 K/uL  Auto Eosinophil # : 0.18 K/uL  Auto Basophil # : 0.01 K/uL  Auto Neutrophil % : 90.1 %  Auto Lymphocyte % : 4.1 %  Auto Monocyte % : 3.8 %  Auto Eosinophil % : 1.3 %  Auto Basophil % : 0.1 %    11-16    137  |  105  |  38<H>  ----------------------------<  116<H>  3.5   |  21<L>  |  4.19<H>    Ca    6.7<L>      16 Nov 2020 05:45  Phos  3.2     11-16  Mg     2.1     11-16    TPro  4.8<L>  /  Alb  1.5<L>  /  TBili  0.8  /  DBili  x   /  AST  74<H>  /  ALT  56  /  AlkPhos  65  11-16    PT/INR - ( 15 Nov 2020 06:45 )   PT: 16.4 sec;   INR: 1.40 ratio         PTT - ( 15 Nov 2020 06:45 )  PTT:35.6 sec    Culture Results:   No growth at 48 hours (11-13 @ 19:06)      RADIOLOGY & ADDITIONAL STUDIES REVIEWED: YES     [ ]GOALS OF CARE DISCUSSION WITH PATIENT/FAMILY/PROXY:    CRITICAL CARE TIME SPENT: 35 minutes

## 2020-11-16 NOTE — CHART NOTE - NSCHARTNOTEFT_GEN_A_CORE
Assessment:   Patient is a 85y old  Male who presents with a chief complaint of Shortness of breath (2020 14:24). TF ordered. Pt seems to have been started on TF 11/15 @10 ml/hr, increased to 20 ml/hr (some hours). Pt seen earlier, Tf not hanging, not on flow record?.      Factors impacting intake: [ ] none [ ] nausea  [ ] vomiting [ ] diarrhea [ ] constipation  [ ]chewing problems [ ] swallowing issues  [x ] other: Vent, critical illness    Diet Prescription: Diet, NPO with Tube Feed:   Tube Feeding Modality: Nasogastric  Nepro with Carb Steady  Total Volume for 24 Hours (mL): 960  Continuous  Starting Tube Feed Rate {mL per Hour}: 10  Increase Tube Feed Rate by (mL): 10     Every 2 hours  Until Goal Tube Feed Rate (mL per Hour): 40  Tube Feed Duration (in Hours): 24  Tube Feed Start Time: 23:59 (20 @ 23:24)    Intake:     Daily     Daily Weight in k.2 (2020 11:55)  Weight in k.6 (2020 07:00)  Weight in k.7 (2020 22:50)  Weight in k.7 (2020 19:50)  Weight in k.4 (2020 10:30)  Weight in k.7 (2020 07:00)    % Weight Change: 3+ generalized, 4+ B/L hand edema (with poor urine output/ HD)    Pertinent Medications: MEDICATIONS  (STANDING):  ALBUTerol    90 MICROgram(s) HFA Inhaler 2 Puff(s) Inhalation every 6 hours  chlorhexidine 0.12% Liquid 15 milliLiter(s) Oral Mucosa every 12 hours  chlorhexidine 2% Cloths 1 Application(s) Topical <User Schedule>  dexMEDEtomidine Infusion 0.2 MICROgram(s)/kG/Hr (3.83 mL/Hr) IV Continuous <Continuous>  fentaNYL   Infusion. 3 MICROgram(s)/kG/Hr (23 mL/Hr) IV Continuous <Continuous>  finasteride 5 milliGRAM(s) Oral daily  heparin   Injectable 5000 Unit(s) SubCutaneous every 8 hours  norepinephrine Infusion 0.85 MICROgram(s)/kG/Min (61 mL/Hr) IV Continuous <Continuous>  pantoprazole  Injectable 40 milliGRAM(s) IV Push daily  piperacillin/tazobactam IVPB.. 3.375 Gram(s) IV Intermittent every 12 hours  polyethylene glycol 3350 17 Gram(s) Oral daily  ranolazine 500 milliGRAM(s) Oral two times a day  senna 2 Tablet(s) Oral at bedtime  sodium bicarbonate 650 milliGRAM(s) Oral every 8 hours  tamsulosin 0.4 milliGRAM(s) Oral at bedtime    MEDICATIONS  (PRN):    Pertinent Labs:  Na137 mmol/L Glu 116 mg/dL<H> K+ 3.5 mmol/L Cr  4.19 mg/dL<H> BUN 38 mg/dL<H>  Phos 3.2 mg/dL  Alb 1.5 g/dL<L>  Chol 172 mg/dL LDL --    HDL 46 mg/dL Trig 83 mg/dL     CAPILLARY BLOOD GLUCOSE      POCT Blood Glucose.: 130 mg/dL (2020 04:59)  POCT Blood Glucose.: 121 mg/dL (15 Nov 2020 23:28)      Previous Nutrition Diagnosis:   [ ] Altered GI function  [x ]Inadequate Oral Intake [ ] Swallowing Difficulty   [ ] Altered nutrition related labs [ ] Increased Nutrient Needs [ ] Overweight/Obesity   [ ] Unintended Weight Loss [ ] Food & Nutrition Related Knowledge Deficit [ ] Malnutrition   [ ] Other:     New Nutrition Diagnosis: [x ] PCM moderate ( ?severe) related to acute illness as evidenced by <50% needs met> 5 days,    (generalized edema 3+/ B/L hand edema 4+, weakness x 2 weeks PTA.      Interventions:   Recommend  [ ] Change Diet To:  [ ] Nutrition Supplement  [x ] Nutrition Support: Nepro 40x24 (initial goal/ as medically feasible and consistent with goals of care ): 960 ml, 1728 kcals, 79 gm protein). MD to monitor. RD available.   [ ] Other:     Monitoring and Evaluation:    [ x ] Tolerance to diet prescription [ x ] weights [ x ] labs[ x ] follow up per protocol  [ ] other:

## 2020-11-16 NOTE — PROGRESS NOTE ADULT - ASSESSMENT
Pt is a 84 yo male with PMHx of CAD with stent, Afib on Eliquis, HTN, HLD, Gout, who presented to ED for generalized weakness for the past 2 weeks, generalized deteriorating progressively, and worsening cough. Son also reports that the patient's mental status has been slowly deteriorating. Admitted with fluid overload.    A/P:  Renal failure:  Unclear baseline  Acute Vs CKD but CKD is likely  Etiology?  Has HTN, Proteinuria-Glomerulonephritis can not be ruled out.      Fluid Overload:  SOB sec to fluid overload, also has multifocal pneumonia  S/P intubation and is getting HD.  UF with HD as tolerates  Currently on vasopressure, vitals making it difficult to remove much fluid, so far still the goal is for 3L.  Will dialyze almost daily till euvolemic, still very low urine output  Monitor I/O  Daily weight  D/W his son got consent for HD      Proteinuria:  Etiology?  Urine protein/cr ratio 2.4gm  Follow up vasculitis work up. C3 is low  Has negative JAI, C4, Hep C, HBsAg,  serum free light chain, RPR,  ANCA, anti-GBM  Pending, SPEP, Serum immunofixation, HIV      Hyponatremia:  Likely sec to fluid overload  Ruled out SIADH  Better     Acidosis:  Both rep and metabolic-Non-AG  Hold HCO3 now that he is on HD  Vent management per ICU    Anemia:  Etiology?  Iron study suggest iron deficiency  Can't give IV iron in view of infection  Get B12, folate  Transfuse PRN to keep Hb >8.0  Monitor Hb Pt is a 86 yo male with PMHx of CAD with stent, Afib on Eliquis, HTN, HLD, Gout, who presented to ED for generalized weakness for the past 2 weeks, generalized deteriorating progressively, and worsening cough. Son also reports that the patient's mental status has been slowly deteriorating. Admitted with fluid overload.    A/P:  Renal failure:  Unclear baseline  Acute on CKD    Etiology? likely ATN  d/w his outpatient nephrologist Dr. 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  UF with HD as tolerates  Currently on vasopressure, vitals making it difficult to remove much fluid, so far still the goal is for 3L.  Will dialyze almost daily till euvolemic, still very low urine output  Monitor I/O  Daily weight  D/W his son got consent for HD      Proteinuria:  Etiology?  Urine protein/cr ratio 2.4gm  Follow up vasculitis work up. C3 is low  Has negative JAI, C4, Hep C, HBsAg,  serum free light chain, RPR,  ANCA, anti-GBM  Pending, SPEP, Serum immunofixation, HIV      Hyponatremia:  Likely sec to fluid overload  Ruled out SIADH  Better     Acidosis:  Both rep and metabolic-Non-AG  Hold HCO3 now that he is on HD  Vent management per ICU    Anemia:  Etiology?  Iron study suggest iron deficiency  Can't give IV iron in view of infection  Get B12, folate  Transfuse PRN to keep Hb >8.0  Monitor Hb

## 2020-11-17 NOTE — PROGRESS NOTE ADULT - ASSESSMENT
Pt is a 86 yo male with PMHx of CAD with stent, Afib on Eliquis, HTN, HLD, Gout, who presented to ED for generalized weakness for the past 2 weeks, generalized deteriorating progressively, and worsening cough. Son also reports that the patient's mental status has been slowly deteriorating. Admitted with fluid overload.    A/P:  Renal failure:  Unclear baseline  Acute on CKD    Etiology? likely ATN  d/w his outpatient nephrologist Dr. 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  UF with HD as tolerates  Currently on vasopressure, vitals making it difficult to remove much fluid, so far still the goal is for 3L.  Will dialyze almost daily till euvolemic, still very low urine output  Monitor I/O  Daily weight  D/W his son got consent for HD      Proteinuria:  Etiology?  Urine protein/cr ratio 2.4gm  Follow up vasculitis work up. C3 is low  Has negative JAI, C4, Hep C, HBsAg,  serum free light chain, RPR,  ANCA, anti-GBM  Pending, SPEP, Serum immunofixation, HIV      Hyponatremia:  Likely sec to fluid overload  Ruled out SIADH  Better     Acidosis:  Both rep and metabolic-Non-AG  Hold HCO3 now that he is on HD  Vent management per ICU    Anemia:  Etiology?  Iron study suggest iron deficiency  Can't give IV iron in view of infection  Get B12, folate  Transfuse PRN to keep Hb >8.0  Monitor Hb

## 2020-11-17 NOTE — PROGRESS NOTE ADULT - SUBJECTIVE AND OBJECTIVE BOX
INTERVAL HPI/OVERNIGHT EVENTS: No acute event overnight. Patient remains sedated and intubated.    REVIEW OF SYSTEMS:  - unable to obtain 2/2 mental status      PRESSORS: [x] YES [] NO  WHICH: noveped     Antimicrobial:  piperacillin/tazobactam IVPB.. 3.375 Gram(s) IV Intermittent every 8 hours    Cardiovascular:  furosemide   IVPB 160 milliGRAM(s) IV Intermittent once  norepinephrine Infusion 0.85 MICROgram(s)/kG/Min IV Continuous <Continuous>  ranolazine 500 milliGRAM(s) Oral two times a day  tamsulosin 0.4 milliGRAM(s) Oral at bedtime    Pulmonary:  ALBUTerol    90 MICROgram(s) HFA Inhaler 2 Puff(s) Inhalation every 6 hours    Hematalogic:  heparin   Injectable 5000 Unit(s) SubCutaneous every 8 hours    Other:  albumin human 25% IVPB 100 milliLiter(s) IV Intermittent every 6 hours  chlorhexidine 0.12% Liquid 15 milliLiter(s) Oral Mucosa every 12 hours  chlorhexidine 2% Cloths 1 Application(s) Topical <User Schedule>  dexMEDEtomidine Infusion 0.2 MICROgram(s)/kG/Hr IV Continuous <Continuous>  fentaNYL   Infusion. 1.503 MICROgram(s)/kG/Hr IV Continuous <Continuous>  finasteride 5 milliGRAM(s) Oral daily  pantoprazole  Injectable 40 milliGRAM(s) IV Push daily  polyethylene glycol 3350 17 Gram(s) Oral daily  senna 2 Tablet(s) Oral at bedtime  sodium bicarbonate 650 milliGRAM(s) Oral every 8 hours    albumin human 25% IVPB 100 milliLiter(s) IV Intermittent every 6 hours  ALBUTerol    90 MICROgram(s) HFA Inhaler 2 Puff(s) Inhalation every 6 hours  chlorhexidine 0.12% Liquid 15 milliLiter(s) Oral Mucosa every 12 hours  chlorhexidine 2% Cloths 1 Application(s) Topical <User Schedule>  dexMEDEtomidine Infusion 0.2 MICROgram(s)/kG/Hr IV Continuous <Continuous>  fentaNYL   Infusion. 1.503 MICROgram(s)/kG/Hr IV Continuous <Continuous>  finasteride 5 milliGRAM(s) Oral daily  furosemide   IVPB 160 milliGRAM(s) IV Intermittent once  heparin   Injectable 5000 Unit(s) SubCutaneous every 8 hours  norepinephrine Infusion 0.85 MICROgram(s)/kG/Min IV Continuous <Continuous>  pantoprazole  Injectable 40 milliGRAM(s) IV Push daily  piperacillin/tazobactam IVPB.. 3.375 Gram(s) IV Intermittent every 12 hours  polyethylene glycol 3350 17 Gram(s) Oral daily  ranolazine 500 milliGRAM(s) Oral two times a day  senna 2 Tablet(s) Oral at bedtime  sodium bicarbonate 650 milliGRAM(s) Oral every 8 hours  tamsulosin 0.4 milliGRAM(s) Oral at bedtime    Drug Dosing Weight  Height (cm): 177.8 (12 Nov 2020 08:58)  Weight (kg): 76.5 (12 Nov 2020 08:58)  BMI (kg/m2): 24.2 (12 Nov 2020 08:58)  BSA (m2): 1.94 (12 Nov 2020 08:58)      CENTRAL LINE: [X] YES [] NO  LOCATION: left IJ    ESCOBAR: [x] YES [] NO        A-LINE:  [] YES [x] NO  LOCATION: N/A    ICU Vital Signs Last 24 Hrs  T(C): 36.1 (17 Nov 2020 04:00), Max: 37 (16 Nov 2020 16:55)  T(F): 97 (17 Nov 2020 04:00), Max: 98.6 (16 Nov 2020 16:55)  HR: 105 (17 Nov 2020 09:10) (86 - 141)  BP: 99/53 (17 Nov 2020 09:00) (73/45 - 159/73)  BP(mean): 66 (17 Nov 2020 09:00) (40 - 103)  ABP: --  ABP(mean): --  RR: 20 (17 Nov 2020 09:00) (15 - 25)  SpO2: 100% (17 Nov 2020 09:10) (94% - 100%)      ABG - ( 17 Nov 2020 03:35 )  pH, Arterial: 7.31  pH, Blood: x     /  pCO2: 43    /  pO2: 60    / HCO3: 21    / Base Excess: -4.2  /  SaO2: 90                    11-16 @ 07:01  -  11-17 @ 07:00  --------------------------------------------------------  IN: 2837.4 mL / OUT: 2945 mL / NET: -107.6 mL        Mode: AC/ CMV (Assist Control/ Continuous Mandatory Ventilation)  RR (machine): 20  TV (machine): 500  FiO2: 80  PEEP: 8  ITime: 1  MAP: 16  PIP: 31        PHYSICAL EXAM:    GENERAL: sedated and intubated  NECK: Supple, No JVD; Normal thyroid; Trachea midline  CHEST/LUNG: No rales, rhonchi, wheezing   HEART: Regular rate and rhythm; No murmurs,   ABDOMEN: Soft, Nontender, Nondistended; Bowel sounds present  EXTREMITIES: +2 pitting edema noted on all extremities; 2+ Peripheral Pulses, No clubbing, or cyanosis    LABS:  CBC Full  -  ( 17 Nov 2020 04:21 )  WBC Count : 14.16 K/uL  RBC Count : 2.83 M/uL  Hemoglobin : 9.2 g/dL  Hematocrit : 27.5 %  Platelet Count - Automated : 112 K/uL  Mean Cell Volume : 97.2 fl  Mean Cell Hemoglobin : 32.5 pg  Mean Cell Hemoglobin Concentration : 33.5 gm/dL  Auto Neutrophil # : 12.59 K/uL  Auto Lymphocyte # : 0.64 K/uL  Auto Monocyte # : 0.48 K/uL  Auto Eosinophil # : 0.34 K/uL  Auto Basophil # : 0.02 K/uL  Auto Neutrophil % : 89.0 %  Auto Lymphocyte % : 4.5 %  Auto Monocyte % : 3.4 %  Auto Eosinophil % : 2.4 %  Auto Basophil % : 0.1 %    11-17    137  |  102  |  37<H>  ----------------------------<  82  3.7   |  24  |  4.19<H>    Ca    6.9<L>      17 Nov 2020 07:28  Phos  3.2     11-16  Mg     2.1     11-16    TPro  5.2<L>  /  Alb  1.5<L>  /  TBili  0.9  /  DBili  x   /  AST  61<H>  /  ALT  50  /  AlkPhos  67  11-17            RADIOLOGY & ADDITIONAL STUDIES REVIEWED: yes    [x]GOALS OF CARE DISCUSSION WITH PATIENT/FAMILY/PROXY: full code    CRITICAL CARE TIME SPENT: 35 minutes

## 2020-11-17 NOTE — PROGRESS NOTE ADULT - SUBJECTIVE AND OBJECTIVE BOX
Dr. Vaughan  Office [phone]  Cell [phone]  Pallavi GRAJEDA  Cell [phone]    RENAL PROGRESS NOTE: DATE OF SERVICE 11-17-20 @ 12:39    Patient is a 85y old  Male who presents with a chief complaint of Shortness of breath (17 Nov 2020 11:30)      Patient seen and examined at bedside in MICU. Remain intubated    VITALS:  T(F): 97 (11-17-20 @ 04:00), Max: 98.6 (11-16-20 @ 16:55)  HR: 105 (11-17-20 @ 09:10)  BP: 99/53 (11-17-20 @ 09:00)  RR: 20 (11-17-20 @ 09:00)  SpO2: 100% (11-17-20 @ 09:10)  Wt(kg): --    11-16 @ 07:01  -  11-17 @ 07:00  --------------------------------------------------------  IN: 2837.4 mL / OUT: 2945 mL / NET: -107.6 mL          PHYSICAL EXAM:  Constitutional: Remain intubated  Neck: No JVD  Respiratory: Bilateral basal crackles+  Cardiovascular: S1, S2, RRR  Gastrointestinal: BS+, soft, NT/ND  Extremities: 3+ peripheral edema    Hospital Medications:   MEDICATIONS  (STANDING):  albumin human 25% IVPB 100 milliLiter(s) IV Intermittent every 6 hours  ALBUTerol    90 MICROgram(s) HFA Inhaler 2 Puff(s) Inhalation every 6 hours  chlorhexidine 0.12% Liquid 15 milliLiter(s) Oral Mucosa every 12 hours  chlorhexidine 2% Cloths 1 Application(s) Topical <User Schedule>  dexMEDEtomidine Infusion 0.2 MICROgram(s)/kG/Hr (3.83 mL/Hr) IV Continuous <Continuous>  fentaNYL   Infusion. 1.503 MICROgram(s)/kG/Hr (11.5 mL/Hr) IV Continuous <Continuous>  finasteride 5 milliGRAM(s) Oral daily  heparin   Injectable 5000 Unit(s) SubCutaneous every 8 hours  norepinephrine Infusion 0.85 MICROgram(s)/kG/Min (61 mL/Hr) IV Continuous <Continuous>  pantoprazole  Injectable 40 milliGRAM(s) IV Push daily  piperacillin/tazobactam IVPB.. 3.375 Gram(s) IV Intermittent every 12 hours  polyethylene glycol 3350 17 Gram(s) Oral daily  ranolazine 500 milliGRAM(s) Oral two times a day  senna 2 Tablet(s) Oral at bedtime  sodium bicarbonate 650 milliGRAM(s) Oral every 8 hours  tamsulosin 0.4 milliGRAM(s) Oral at bedtime      LABS:  11-17    137  |  102  |  37<H>  ----------------------------<  82  3.7   |  24  |  4.19<H>    Ca    6.9<L>      17 Nov 2020 07:28  Phos  3.2     11-16  Mg     2.1     11-16    TPro  5.2<L>  /  Alb  1.5<L>  /  TBili  0.9  /  DBili      /  AST  61<H>  /  ALT  50  /  AlkPhos  67  11-17    Creatinine Trend: 4.19 <--, 4.04 <--, 4.19 <--, 2.77 <--, 3.51 <--, 2.97 <--, 4.21 <--, 3.27 <--, 3.03 <--, 5.96 <--, 5.37 <--, 5.60 <--    Albumin, Serum: 1.5 g/dL (11-17 @ 04:21)                              9.2    14.16 )-----------( 112      ( 17 Nov 2020 04:21 )             27.5     Urine Studies:  Urinalysis - [11-12-20 @ 18:04]      Color Yellow / Appearance Clear / SG 1.025 / pH 5.0      Gluc Negative / Ketone Negative  / Bili Negative / Urobili Negative       Blood Moderate / Protein 100 / Leuk Est Trace / Nitrite Negative      RBC 5-10 / WBC 3-5 / Hyaline 0-2 / Gran 5-10 / Sq Epi  / Non Sq Epi Few / Bacteria Few    Urine Creatinine 88      [11-13-20 @ 13:49]  Urine Protein 218      [11-13-20 @ 13:49]  Urine Sodium 41      [11-13-20 @ 13:49]  Urine Osmolality 284      [11-13-20 @ 13:49]    Iron 24, TIBC 142, %sat 17      [11-13-20 @ 13:38]  TSH 2.01      [11-13-20 @ 06:39]  Lipid: chol 172, TG 83, HDL 46, LDL --      [11-13-20 @ 06:39]    HBsAg Nonreact      [11-14-20 @ 10:25]  HCV 0.15, Nonreact      [11-14-20 @ 10:25]    JAI: titer Negative, pattern --      [11-13-20 @ 20:35]  C3 Complement 52      [11-13-20 @ 22:30]  C4 Complement 20      [11-13-20 @ 22:30]  ANCA: cANCA Negative, pANCA Negative, atypical ANCA Indeterminate      [11-13-20 @ 20:35]  anti-GBM <1.0      [11-13-20 @ 21:37]  Syphilis Screen (Treponema Pallidum Ab) Negative      [11-13-20 @ 20:35]  Free Light Chains: kappa 14.62, lambda 10.97, ratio = 1.33      [11-13 @ 22:30]    RADIOLOGY & ADDITIONAL STUDIES:

## 2020-11-17 NOTE — PROGRESS NOTE ADULT - ASSESSMENT
a 84 yo male with PMHx of CAD with stent, Afib on Eliquis, HTN, HLD, Gout who presented with weakness and shortness of breath. Pt is being admitted to ICU for hypoxic respiratory failure requiring BIPAP support.    1.Hypoxic Resp failure due to pneumonia and fluid overload  2.CKD  3.Acute encephalopathy  4.Afib on Eliquis  5.HTN  6.BPH  7.Anemia    =====CNS=====  #intubated and sedated since     #Acute Encephalopathy vs worsening dementia  - CT head on admission negative for any acute stroke    =====CVS=====  #Afib, on eliquis at home  - EKG afib, no acute ischemic changes  - c/w heparin drip for now    #CAD  - h/o CAD, on no meds    =====Pulm=====  #acute pulmonary edema, likely 2/2 fluid overload from worsening VASQUEZ  - echo (10/22) normal EF, mild-mod MR and mild tricuspid regurgitation  - c/w temporary HD    #acute hypoxic respiratory failure, likely 2/2 fluid overload & PNA  - intubated on 2020 for hypoxia   - vent 26/510/60/8    =====GI=====  #no acute issues    =====Renal=====Dr. Caputo  #VASQUEZ  - baseline Cr of around 2.7 on   - Cr 4.19 ()  - AB.3/43/60/21, metabolic acidosis  - c/w HD (d4), Nabicarb 650mg PO q8h  - strict I/O monitoring    =====ID=====  #possible PNA  - Scx, Bcx, and Ucx neg  - COVID neg  - CXR increasing bilateral pleural effusions, greater on the left. Cannot exclude underlying infiltrate or atelectasis  - c/w zosyn    =====Heme=====  #Anemia, likely 2/2 hematuria  - resolved  - hgb 9.2 ()  - s/p 3u pRBC    =====Skin/Catheters=====  #no acute issues    =====Ppx  Feeding/fluid: nephro 40ml x 24hr  Analgesic: none  Sedation: none  Thrombolytics: full-dose lovenox for elevated d-dimer in COVID pt  Head elevation: none  Ulcer, stress: PPI 40mg PO  Glycemic control: HSS  Bowel regimen: none  Indwelling catheter: none  Drug de-escalation: none    -will start heparin drip. but h/h unstable   -pantoprazole for GI ppx a 86 yo male with PMHx of CAD with stent, Afib on Eliquis, HTN, HLD, Gout who presented with weakness and shortness of breath. Pt is being admitted to ICU for hypoxic respiratory failure requiring BIPAP support.    1.Hypoxic Resp failure due to pneumonia and fluid overload  2.CKD  3.Acute encephalopathy  4.Afib on Eliquis  5.HTN  6.BPH  7.Anemia    =====CNS=====  #intubated and sedated since     #Acute Encephalopathy vs worsening dementia  - CT head on admission negative for any acute stroke    =====CVS=====  #Afib, on eliquis at home  - EKG afib, no acute ischemic changes  - c/w heparin drip for now    #CAD  - h/o CAD, on no meds    =====Pulm=====  #acute pulmonary edema, likely 2/2 fluid overload from worsening VASQUEZ  - echo (10/22) normal EF, mild-mod MR and mild tricuspid regurgitation  - c/w temporary HD    #acute hypoxic respiratory failure, likely 2/2 fluid overload & PNA  - intubated on 2020 for hypoxia   - vent 26/510/60/8    =====GI=====  #no acute issues    =====Renal=====Dr. Caputo  #VASQUEZ  - baseline Cr of around 2.7 on   - Cr 4.19 ()  - AB.3/43/60/21, metabolic acidosis  - c/w HD (d4), Nabicarb 650mg PO q8h  - strict I/O monitoring    =====ID=====  #possible PNA  - Scx, Bcx, and Ucx neg  - COVID neg  - CXR increasing bilateral pleural effusions, greater on the left. Cannot exclude underlying infiltrate or atelectasis  - c/w zosyn    =====Heme=====  #Anemia, likely 2/2 hematuria  - resolved  - hgb 9.2 ()  - s/p 3u pRBC    =====Skin/Catheters=====  #no acute issues    =====Ppx  Feeding/fluid: nephro 40ml x 24hr  Analgesic: none  Sedation: precedex 0.2mcg/kg/hr, fentanyl 3mg/kg/hr  Thrombolytics: heparin drip  Head elevation: none  Ulcer, stress: PPI 40mg IVP  Glycemic control: none  Bowel regimen: senna, miralax  Indwelling catheter: yes  Drug de-escalation: no

## 2020-11-18 NOTE — PROGRESS NOTE ADULT - SUBJECTIVE AND OBJECTIVE BOX
INTERVAL HPI/OVERNIGHT EVENTS: No acute events overnight. Remains sedated and intubated. Fentanyl d/c'd this am.     REVIEW OF SYSTEMS:  - unable to obtain 2/2 mental status      PRESSORS: [] YES [x] NO  WHICH: levophed d/c'd today    Antimicrobial:  piperacillin/tazobactam IVPB.. 3.375 Gram(s) IV Intermittent every 12 hours    Cardiovascular:  ranolazine 500 milliGRAM(s) Oral two times a day  tamsulosin 0.4 milliGRAM(s) Oral at bedtime    Pulmonary:  ALBUTerol    90 MICROgram(s) HFA Inhaler 2 Puff(s) Inhalation every 6 hours    Hematalogic:  heparin  Infusion.  Unit(s)/Hr IV Continuous <Continuous>    Other:  albumin human 25% IVPB 100 milliLiter(s) IV Intermittent every 6 hours  chlorhexidine 0.12% Liquid 15 milliLiter(s) Oral Mucosa every 12 hours  chlorhexidine 2% Cloths 1 Application(s) Topical <User Schedule>  finasteride 5 milliGRAM(s) Oral daily  lactulose Syrup 10 Gram(s) Oral daily  pantoprazole  Injectable 40 milliGRAM(s) IV Push daily  polyethylene glycol 3350 17 Gram(s) Oral daily  senna 2 Tablet(s) Oral at bedtime  sodium bicarbonate 650 milliGRAM(s) Oral every 8 hours    albumin human 25% IVPB 100 milliLiter(s) IV Intermittent every 6 hours  ALBUTerol    90 MICROgram(s) HFA Inhaler 2 Puff(s) Inhalation every 6 hours  chlorhexidine 0.12% Liquid 15 milliLiter(s) Oral Mucosa every 12 hours  chlorhexidine 2% Cloths 1 Application(s) Topical <User Schedule>  finasteride 5 milliGRAM(s) Oral daily  heparin  Infusion.  Unit(s)/Hr IV Continuous <Continuous>  lactulose Syrup 10 Gram(s) Oral daily  pantoprazole  Injectable 40 milliGRAM(s) IV Push daily  piperacillin/tazobactam IVPB.. 3.375 Gram(s) IV Intermittent every 12 hours  polyethylene glycol 3350 17 Gram(s) Oral daily  ranolazine 500 milliGRAM(s) Oral two times a day  senna 2 Tablet(s) Oral at bedtime  sodium bicarbonate 650 milliGRAM(s) Oral every 8 hours  tamsulosin 0.4 milliGRAM(s) Oral at bedtime    Drug Dosing Weight  Height (cm): 177.8 (12 Nov 2020 08:58)  Weight (kg): 76.5 (12 Nov 2020 08:58)  BMI (kg/m2): 24.2 (12 Nov 2020 08:58)  BSA (m2): 1.94 (12 Nov 2020 08:58)      CENTRAL LINE: [x] YES [] NO  LOCATION: [facility]    ESCOBAR: [x] YES [] NO        A-LINE:  [] YES [x] NO  LOCATION: N/A    ICU Vital Signs Last 24 Hrs  T(C): 37.1 (18 Nov 2020 10:25), Max: 37.3 (18 Nov 2020 04:30)  T(F): 98.8 (18 Nov 2020 10:25), Max: 99.1 (18 Nov 2020 04:30)  HR: 110 (18 Nov 2020 12:07) (96 - 141)  BP: 119/51 (18 Nov 2020 12:00) (92/71 - 157/61)  BP(mean): 69 (18 Nov 2020 12:00) (61 - 101)  ABP: --  ABP(mean): --  RR: 21 (18 Nov 2020 12:00) (16 - 24)  SpO2: 94% (18 Nov 2020 12:07) (92% - 100%)      ABG - ( 18 Nov 2020 03:43 )  pH, Arterial: 7.31  pH, Blood: x     /  pCO2: 48    /  pO2: 49    / HCO3: 24    / Base Excess: -2.1  /  SaO2: 83            11-17 @ 07:01  -  11-18 @ 07:00  --------------------------------------------------------  IN: 2914.4 mL / OUT: 2755 mL / NET: 159.4 mL        Mode: AC/ CMV (Assist Control/ Continuous Mandatory Ventilation)  RR (machine): 20  TV (machine): 500  FiO2: 50  PEEP: 5  ITime: 1  MAP: 13  PIP: 29        PHYSICAL EXAM:    GENERAL: sedated and intubated,   NECK: Supple, No JVD; Normal thyroid; Trachea midline  NERVOUS SYSTEM: Motor Strength 5/5 B/L upper and lower extremities; DTRs 2+ intact and symmetric  CHEST/LUNG: No rales, rhonchi, wheezing   HEART: Regular rate and rhythm; No murmurs,   ABDOMEN: Soft, Nontender, Nondistended; Bowel sounds present  EXTREMITIES:  2+ Peripheral Pulses, No clubbing, cyanosis, or edema    LABS:  CBC Full  -  ( 18 Nov 2020 03:28 )  WBC Count : 13.88 K/uL  RBC Count : 2.62 M/uL  Hemoglobin : 8.5 g/dL  Hematocrit : 25.7 %  Platelet Count - Automated : 90 K/uL  Mean Cell Volume : 98.1 fl  Mean Cell Hemoglobin : 32.4 pg  Mean Cell Hemoglobin Concentration : 33.1 gm/dL  Auto Neutrophil # : 12.59 K/uL  Auto Lymphocyte # : 0.51 K/uL  Auto Monocyte # : 0.40 K/uL  Auto Eosinophil # : 0.20 K/uL  Auto Basophil # : 0.02 K/uL  Auto Neutrophil % : 90.7 %  Auto Lymphocyte % : 3.7 %  Auto Monocyte % : 2.9 %  Auto Eosinophil % : 1.4 %  Auto Basophil % : 0.1 %    11-18    138  |  104  |  30<H>  ----------------------------<  80  3.3<L>   |  24  |  3.56<H>    Ca    7.1<L>      18 Nov 2020 03:28  Phos  2.4     11-18  Mg     2.0     11-18    TPro  5.7<L>  /  Alb  2.5<L>  /  TBili  0.9  /  DBili  x   /  AST  51<H>  /  ALT  39  /  AlkPhos  69  11-18    PT/INR - ( 17 Nov 2020 15:56 )   PT: 15.2 sec;   INR: 1.29 ratio         PTT - ( 18 Nov 2020 07:51 )  PTT:>200.0 sec        RADIOLOGY & ADDITIONAL STUDIES REVIEWED:  Yes    [x]GOALS OF CARE DISCUSSION WITH PATIENT/FAMILY/PROXY: full code    CRITICAL CARE TIME SPENT: 35 minutes

## 2020-11-18 NOTE — CONSULT NOTE ADULT - PROBLEM SELECTOR RECOMMENDATION 9
2/2 multifocal PNA/fluid overload 2/2 VASQUEZ on CKD; involving lungs (patient intubated); heart (on pressor, G2DD on echo); kidneys (VASQUEZ, Cr 3.56 - baseline Cr 2.7 on 5/20, started on emergent HD). WBC 13.88. CXR indicates Increasing bilateral pleural effusions, greater on the left and chronic fibrotic changes. Patient remains sedated/intubated, on IV abx, pressor, albumin. Patient with poor prognosis. 2/2 multifocal PNA/fluid overload 2/2 VASQUEZ on CKD; involving lungs (patient intubated); heart (on pressor, G2DD on echo); kidneys (VASQUEZ, Cr 3.56 - baseline Cr 2.7 on 5/20, started on emergent HD). WBC 13.88. CXR indicates Increasing bilateral pleural effusions, greater on the left and chronic fibrotic changes. Patient remains sedated/intubated, on IV abx, pressor, albumin. Patient with poor prognosis. Full code.

## 2020-11-18 NOTE — CONSULT NOTE ADULT - PROBLEM SELECTOR RECOMMENDATION 2
2/2 multifocal PNA; comorbid MOF involving lungs, heart, kidneys. WBC 13.88. CXR indicates Increasing bilateral pleural effusions, greater on the left and chronic fibrotic changes. Patient remains sedated/intubated, on IV abx, pressor. Overall, poor prognosis.

## 2020-11-18 NOTE — PROGRESS NOTE ADULT - ASSESSMENT
Pt is a 86 yo male with PMHx of CAD with stent, Afib on Eliquis, HTN, HLD, Gout, who presented to ED for generalized weakness for the past 2 weeks, generalized deteriorating progressively, and worsening cough. Son also reports that the patient's mental status has been slowly deteriorating. Admitted with fluid overload.    A/P:  Renal failure:  Acute on CKD    Etiology? likely ATN  d/w his outpatient nephrologist Dr. 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  Marginal vitals making it difficult to remove much fluid  Will dialyze almost daily till euvolemic, still very low urine output  Monitor I/O  Daily weight  D/W his son got consent for HD      Proteinuria:  Etiology?  Urine protein/cr ratio 2.4gm  Follow up vasculitis work up. C3 is low  Has negative JAI, C4, Hep C, HBsAg,  serum free light chain, RPR,  ANCA, anti-GBM, SPEP, Serum immunofixation    Hyponatremia:  Likely sec to fluid overload  Ruled out SIADH  Better     Acidosis:  Both rep and metabolic-Non-AG  Hold HCO3 now that he is on HD  Vent management per ICU    Anemia:  Etiology?  Iron study suggest iron deficiency  Can't give IV iron in view of infection  Get B12, folate  Transfuse PRN to keep Hb >8.0  Monitor Hb

## 2020-11-18 NOTE — PROGRESS NOTE ADULT - ASSESSMENT
a 84 yo male with PMHx of CAD with stent, Afib on Eliquis, HTN, HLD, Gout who presented with weakness and shortness of breath. Pt is being admitted to ICU for hypoxic respiratory failure requiring BIPAP support.    1.Hypoxic Resp failure due to pneumonia and fluid overload  2.CKD  3.Acute encephalopathy  4.Afib on Eliquis  5.HTN  6.BPH  7.Anemia    =====CNS=====  #intubated and sedated since     #Acute Encephalopathy vs worsening dementia  - CT head on admission negative for any acute stroke  - will assess today s/p fentanyl d/c'd    =====CVS=====  #Afib, on eliquis at home  - EKG afib, no acute ischemic changes  - c/w heparin drip for now    #CAD  - h/o CAD, on no meds    =====Pulm=====  #acute pulmonary edema, likely 2/2 fluid overload from worsening VASQUEZ  - echo (10/22) normal EF, mild-mod MR and mild tricuspid regurgitation  - c/w temporary HD daily    #acute hypoxic respiratory failure, likely 2/2 fluid overload & PNA  - intubated on 2020 for hypoxia     =====GI=====  #no acute issues    =====Renal=====Dr. Caputo  #VASQUEZ  - baseline Cr of around 2.7 on   - Cr 4.19 () -> 3.56  - AB.3/48/49/24  - c/w HD (d4), Nabicarb 650mg PO q8h  - strict I/O monitoring    #Anasarca  - fluid overload  - alb 2.5 (trending up)  - c/w albumin 100ml q6h x8    =====ID=====  #possible PNA  - Scx, Bcx, and Ucx neg  - COVID neg  - CXR increasing bilateral pleural effusions, greater on the left. Cannot exclude underlying infiltrate or atelectasis  - c/w zosyn x7d (d )    =====Heme=====  #Anemia, likely 2/2 hematuria  - resolved  - hgb 9.2 () -> 8.5  - s/p 3u pRBC  - will transfuse if <7    #Thrombocytopenia  - platelet noted to be trending down  - plt 90 ()  - monitor platelet count  - f/u heparin antibody    =====Skin/Catheters=====  #no acute issues    =====Ppx  Feeding/fluid: nephro 40ml x 24hr  Analgesic: none  Sedation: none  Thrombolytics: heparin drip  Head elevation: yes  Ulcer, stress: PPI 40mg IVP  Glycemic control: none  Bowel regimen: senna, miralax  Indwelling catheter: yes  Drug de-escalation: fentanyl d/c'd   a 84 yo male with PMHx of CAD with stent, Afib on Eliquis, HTN, HLD, Gout who presented with weakness and shortness of breath. Pt is being admitted to ICU for hypoxic respiratory failure requiring BIPAP support.    1.Hypoxic Resp failure due to pneumonia and fluid overload  2.CKD  3.Acute encephalopathy  4.Afib on Eliquis  5.HTN  6.BPH  7.Anemia    =====CNS=====  #intubated and sedated since     #Acute Encephalopathy vs worsening dementia  - CT head on admission negative for any acute stroke  - will assess today s/p fentanyl d/c'd    =====CVS=====  #Afib, on eliquis at home  - EKG afib, no acute ischemic changes  - hold heparin drip due to decreasing platelet count    #CAD  - h/o CAD, on no meds    =====Pulm=====  #acute pulmonary edema, likely 2/2 fluid overload from worsening VASQUEZ  - echo (10/22) normal EF, mild-mod MR and mild tricuspid regurgitation  - c/w temporary HD daily    #acute hypoxic respiratory failure, likely 2/2 fluid overload & PNA  - intubated on 2020 for hypoxia     =====GI=====  #no acute issues    =====Renal=====Dr. Caputo  #VASQUEZ  - baseline Cr of around 2.7 on   - Cr 4.19 () -> 3.56  - AB.3/48/49/24  - c/w HD (d4), Nabicarb 650mg PO q8h  - strict I/O monitoring    #Anasarca  - fluid overload  - alb 2.5 (trending up)  - c/w albumin 100ml q6h x8    =====ID=====  #possible PNA  - Scx, Bcx, and Ucx neg  - COVID neg  - CXR increasing bilateral pleural effusions, greater on the left. Cannot exclude underlying infiltrate or atelectasis  - c/w zosyn x7d (d )    =====Heme=====  #Anemia, likely 2/2 hematuria  - resolved  - hgb 9.2 () -> 8.5  - s/p 3u pRBC  - will transfuse if <7    #Thrombocytopenia  - platelet noted to be trending down  - plt 90 ()  - hold heparin drip  - monitor platelet count  - f/u heparin antibody    =====Skin/Catheters=====  #no acute issues    =====Ppx  Feeding/fluid: nephro 40ml x 24hr  Analgesic: none  Sedation: none  Thrombolytics: heparin drip  Head elevation: yes  Ulcer, stress: PPI 40mg IVP  Glycemic control: none  Bowel regimen: senna, miralax  Indwelling catheter: yes  Drug de-escalation: fentanyl d/c'd

## 2020-11-18 NOTE — PROGRESS NOTE ADULT - SUBJECTIVE AND OBJECTIVE BOX
Dr. Vaughan  Office [phone]  Cell [phone]  P[facility] GRAJEDA  Cell [phone]    RENAL PROGRESS NOTE: DATE OF SERVICE 11-18-20 @ 18:54    Patient is a 85y old  Male who presents with a chief complaint of Shortness of breath (18 Nov 2020 13:08)      Patient seen and examined at bedside in MICU. Remain intubated    VITALS:  T(F): 98.1 (11-18-20 @ 16:28), Max: 99.1 (11-18-20 @ 04:30)  HR: 111 (11-18-20 @ 18:00)  BP: 105/49 (11-18-20 @ 18:00)  RR: 26 (11-18-20 @ 18:00)  SpO2: 94% (11-18-20 @ 18:00)  Wt(kg): --    11-17 @ 07:01  -  11-18 @ 07:00  --------------------------------------------------------  IN: 2914.4 mL / OUT: 2755 mL / NET: 159.4 mL    11-18 @ 07:01  -  11-18 @ 18:54  --------------------------------------------------------  IN: 1607.6 mL / OUT: 2510 mL / NET: -902.4 mL          PHYSICAL EXAM:  Constitutional: Intubated, unresponsive  Neck: No JVD  Respiratory: bilateral basal crackles+  Cardiovascular: S1, S2, RRR  Gastrointestinal: BS+, soft, NT/ND  Extremities: edema 3+    Hospital Medications:   MEDICATIONS  (STANDING):  albumin human 25% IVPB 100 milliLiter(s) IV Intermittent every 6 hours  ALBUTerol    90 MICROgram(s) HFA Inhaler 2 Puff(s) Inhalation every 6 hours  chlorhexidine 0.12% Liquid 15 milliLiter(s) Oral Mucosa every 12 hours  chlorhexidine 2% Cloths 1 Application(s) Topical <User Schedule>  dexMEDEtomidine Infusion 0.5 MICROgram(s)/kG/Hr (9.56 mL/Hr) IV Continuous <Continuous>  finasteride 5 milliGRAM(s) Oral daily  lactulose Syrup 10 Gram(s) Oral daily  pantoprazole  Injectable 40 milliGRAM(s) IV Push daily  piperacillin/tazobactam IVPB.. 3.375 Gram(s) IV Intermittent every 12 hours  polyethylene glycol 3350 17 Gram(s) Oral daily  ranolazine 500 milliGRAM(s) Oral two times a day  senna 2 Tablet(s) Oral at bedtime  tamsulosin 0.4 milliGRAM(s) Oral at bedtime      LABS:  11-18    138  |  104  |  30<H>  ----------------------------<  80  3.3<L>   |  24  |  3.56<H>    Ca    7.1<L>      18 Nov 2020 03:28  Phos  2.4     11-18  Mg     2.0     11-18    TPro  5.7<L>  /  Alb  2.5<L>  /  TBili  0.9  /  DBili      /  AST  51<H>  /  ALT  39  /  AlkPhos  69  11-18    Creatinine Trend: 3.56 <--, 4.19 <--, 4.04 <--, 4.19 <--, 2.77 <--, 3.51 <--, 2.97 <--, 4.21 <--, 3.27 <--, 3.03 <--, 5.96 <--, 5.37 <--, 5.60 <--    Albumin, Serum: 2.5 g/dL (11-18 @ 03:28)  Phosphorus Level, Serum: 2.4 mg/dL (11-18 @ 03:28)                              8.5    13.88 )-----------( 90       ( 18 Nov 2020 03:28 )             25.7     Urine Studies:  Urinalysis - [11-12-20 @ 18:04]      Color Yellow / Appearance Clear / SG 1.025 / pH 5.0      Gluc Negative / Ketone Negative  / Bili Negative / Urobili Negative       Blood Moderate / Protein 100 / Leuk Est Trace / Nitrite Negative      RBC 5-10 / WBC 3-5 / Hyaline 0-2 / Gran 5-10 / Sq Epi  / Non Sq Epi Few / Bacteria Few    Urine Creatinine 88      [11-13-20 @ 13:49]  Urine Protein 218      [11-13-20 @ 13:49]  Urine Sodium 41      [11-13-20 @ 13:49]  Urine Phosphorus See Note      [11-15-20 @ 07:08]  Urine Osmolality 284      [11-13-20 @ 13:49]    Iron 24, TIBC 142, %sat 17      [11-13-20 @ 13:38]  TSH 2.01      [11-13-20 @ 06:39]  Lipid: chol 172, TG 83, HDL 46, LDL --      [11-13-20 @ 06:39]    HBsAg Nonreact      [11-14-20 @ 10:25]  HCV 0.15, Nonreact      [11-14-20 @ 10:25]    JAI: titer Negative, pattern --      [11-13-20 @ 20:35]  C3 Complement 52      [11-13-20 @ 22:30]  C4 Complement 20      [11-13-20 @ 22:30]  ANCA: cANCA Negative, pANCA Negative, atypical ANCA Indeterminate      [11-13-20 @ 20:35]  anti-GBM <1.0      [11-13-20 @ 21:37]  Syphilis Screen (Treponema Pallidum Ab) Negative      [11-13-20 @ 20:35]  Free Light Chains: kappa 14.62, lambda 10.97, ratio = 1.33      [11-13 @ 22:30]  Immunofixation Serum:   No Monoclonal Band Identified    Reference Range: None Detected      [11-13-20 @ 22:30]  SPEP Interpretation: Hypoalbuminemia      [11-13-20 @ 22:30]    RADIOLOGY & ADDITIONAL STUDIES:

## 2020-11-18 NOTE — CONSULT NOTE ADULT - SUBJECTIVE AND OBJECTIVE BOX
HPI:  Pt is a 86 yo male with PMHx of CAD with stent, Afib on Eliquis, HTN, HLD, Gout, who presented to ED for generalized weakness x 2 weeks, generalized deteriorating progressively, and worsening cough. Son also reports that the patient's mental status has been slowly deteriorating. Per son, patient was discharged from [facility] 2.5 weeks ago where he was diagnosed with acute renal failure with a creatinine of 5 and PNA. Ever since discharge, the patient has been able to barely move with persistent productive cough with yellow/green sputum. Since yesterday, patient has been unable to get out of bed. Denies any nausea, vomiting, abdominal pain, dysuria, hematuria, or any other complaints. Pt currently on bipap and unable to provide any history, information obtained from ER attending and patient chart.     BRIEF HOSPITAL COURSE:  Patient admitted to ICU for acute respiratory failure - failed on bipap and was intubated. Patient was started on emergent HD. Remains sedated/intubated, on pressor, Cr 3.56. Request to establish goals of care.     PAST MEDICAL & SURGICAL HISTORY:  HLD (hyperlipidemia)  HTN (hypertension)  Afib  Gout  CAD (coronary artery disease)  No significant past surgical history    SOCIAL HISTORY:    Admitted from:  home  Substance abuse history: none  Tobacco hx:   former, quit 25+ years ago               Alcohol hx: none            Home Opioid hx: none  Sabianist:  Pentecostalism                                  Preferred Language: Mandarin    Surrogate/HCP/Guardian:    Melanie Simpson (wife): [phone]  Cristhian Rooney (son): [phone]    FAMILY HISTORY: No significant family history reported.     Baseline ADLs (prior to admission): Per report, patient with + generalize weakness, confusion/hallucinations     No Known Allergies    Present Symptoms:       Review of Systems:  Unable to obtain due to poor mentation    MEDICATIONS  (STANDING):  albumin human 25% IVPB 100 milliLiter(s) IV Intermittent every 6 hours  ALBUTerol    90 MICROgram(s) HFA Inhaler 2 Puff(s) Inhalation every 6 hours  chlorhexidine 0.12% Liquid 15 milliLiter(s) Oral Mucosa every 12 hours  chlorhexidine 2% Cloths 1 Application(s) Topical <User Schedule>  fentaNYL   Infusion. 1.503 MICROgram(s)/kG/Hr (11.5 mL/Hr) IV Continuous <Continuous>  finasteride 5 milliGRAM(s) Oral daily  heparin  Infusion.  Unit(s)/Hr (14 mL/Hr) IV Continuous <Continuous>  norepinephrine Infusion 0.851 MICROgram(s)/kG/Min (61 mL/Hr) IV Continuous <Continuous>  pantoprazole  Injectable 40 milliGRAM(s) IV Push daily  piperacillin/tazobactam IVPB.. 3.375 Gram(s) IV Intermittent every 12 hours  polyethylene glycol 3350 17 Gram(s) Oral daily  ranolazine 500 milliGRAM(s) Oral two times a day  senna 2 Tablet(s) Oral at bedtime  sodium bicarbonate 650 milliGRAM(s) Oral every 8 hours  tamsulosin 0.4 milliGRAM(s) Oral at bedtime    MEDICATIONS  (PRN):      PHYSICAL EXAM:    Vital Signs Last 24 Hrs  T(C): 36.7 (18 Nov 2020 07:00), Max: 37.3 (18 Nov 2020 04:30)  T(F): 98.1 (18 Nov 2020 07:00), Max: 99.1 (18 Nov 2020 04:30)  HR: 122 (18 Nov 2020 09:00) (93 - 141)  BP: 120/61 (18 Nov 2020 09:00) (92/71 - 157/61)  BP(mean): 75 (18 Nov 2020 09:00) (56 - 120)  RR: 20 (18 Nov 2020 09:00) (16 - 24)  SpO2: 100% (18 Nov 2020 09:00) (92% - 100%)    General: Patient sedated/intubated, on pressor. No signs of distress.   Karnofsky Performance Score/Palliative Performance Status Version2:     10%    HEENT:  dry lips, ET tube   Lungs: comfortable, on ventilator. Continues fentanyl     CV: S1S2, + tachycardia, on pressor  GI:   incontinent, NGT  :  robbins, on HD  Musculoskeletal: sedated/intubated, dependent on ADLs  Skin: + anasarca  Neuro: patient sedated/intubated, dependent on ADLs  Oral intake ability: unable/only mouth care   Diet: NPO; TF via NGT    LABS:                        8.5    13.88 )-----------( 90       ( 18 Nov 2020 03:28 )             25.7     11-18    138  |  104  |  30<H>  ----------------------------<  80  3.3<L>   |  24  |  3.56<H>    Ca    7.1<L>      18 Nov 2020 03:28  Phos  2.4     11-18  Mg     2.0     11-18    TPro  5.7<L>  /  Alb  2.5<L>  /  TBili  0.9  /  DBili  x   /  AST  51<H>  /  ALT  39  /  AlkPhos  69  11-18        RADIOLOGY & ADDITIONAL STUDIES:  < from: CT Chest No Cont (11.14.20 @ 04:12) >  EXAM:  CT ABDOMEN AND PELVIS                        EXAM:  CT CHEST                        PROCEDURE DATE:  11/14/2020    INTERPRETATION:  CT of the chest, abdomen and pelvis  Indication: [lung mass, assess treatment response  Technique: Axial images were obtained from the thoracic inlet through pubic symphysis without oral or IV contrast. Reformatted coronal and sagittal images were submitted.  Comparison: None  FINDINGS:  Evaluation of the solid abdominal organs is limited without contrast.  The visualized neck, axilla and subcutaneous tissues are unremarkable.  Status post endotracheal tube placement, which terminates at the level of the clavicular heads. The tracheobronchial tree is patent centrally.  There is no mediastinal hematoma.  The great vessels are not enlarged. Coronary atherosclerosis. There is no significant mediastinal or hilar adenopathy.  The heart is moderately enlarged.  There is no pericardial effusion.  Lungs: Fibrotic scarring at thelung apices. Patchy airspace, groundglass, and interstitial opacities throughout the lungs, left greater than right are compatible with multifocal pneumonia. Lungs bases demonstrate dense consolidations. Scattered areas of patchy opacity, inclusive of 1.9 cm right middle lobe opacity may be followed up in 6 weeks.  Pleura: Trace left pleural effusion.  There is no free air or focal collection.  Small amount of free fluid.  Liver: Normal.  Spleen: Normal.  Gallbladder: Unremarkable.  Biliary tree: Unremarkable. Is unchanged  Adrenal glands: Normal.  Pancreas: Normal.  Kidneys: Small bilateral renal cysts. No hydronephrosis or obstructing stone.  Bowel:  There is no small bowel obstruction.  Oral contrast is seen as distally as the rectum. The appendix is unremarkable. No significant fecal load. Sigmoid diverticulosis.  Bladder: Decompressed by Robbins catheter.  Pelvic organs: The prostate is not enlarged.  There is no significant adenopathy.  Vasculature: The aorta is not dilated.Moderate atherosclerotic vascular calcification.  Left groin femoral vein approach central venous catheter with tip terminating in the common iliac vein.  Retroperitoneum: There is no mass.  Bones: Unremarkable.  Subcutaneous tissues: Diffuse subcutaneous edema.    IMPRESSION:  Multifocal pneumonia. Small left pleural effusion. Recommend follow-up to resolution.  Cardiomegaly.  No retroperitoneal hematoma.  Subcutaneous edema.  < end of copied text >    < from: CT Head No Cont (11.12.20 @ 10:18) >  EXAM:  CT BRAIN                        PROCEDURE DATE:  11/12/2020    INTERPRETATION:  HISTORY: Altered mental status  Evaluation demonstrates no evidence of mass-effect or midline shift. No intraparenchymal mass lesions or hemorrhage is identified. There is no evidence of hydrocephalus. No extra-axial collections are noted.  The bone windows demonstrate no gross osseous abnormalities.    Impression:  1. Unremarkable noncontrast CT scan of the brain.  < end of copied text >    < from: Xray Chest 1 View- PORTABLE-Routine (Xray Chest 1 View- PORTABLE-Routine in AM.) (11.17.20 @ 07:54) >  EXAM:  XR CHEST PORTABLE ROUTINE 1V                        PROCEDURE DATE:  11/17/2020    INTERPRETATION:  CLINICAL INDICATION: 85 years  Male with icu f/u.  COMPARISON: 11/16/2020  The tip the ET tube is at the clavicular heads. The left jugular catheter tip overlies the superior vena cava. The tip of the enteric catheter is below the diaphragm.  AP view of the chest demonstrates persistent fibrotic changes, most pronounced in the lung bases. There is an increasing left pleuraleffusion and a small right pleural effusion. Cannot exclude underlying infiltrate or atelectasis.  The heart, mediastinum and orion cannot be assessed due to rotation. The aorta is atherosclerotic. The right hilum is normal.  The pulmonary vasculature is normal.  Mild thoracic degenerative changes are present.    IMPRESSION:  Increasing bilateral pleural effusions, greater on the left. Cannot exclude underlying infiltrate or atelectasis.  Chronic fibrotic changes.  Tubes and catheters in satisfactory position.  < end of copied text >    < from: Transthoracic Echocardiogram (11.16.20 @ 10:27) >  Patient name: SHWETHA ROONEY  YOB: 1935   Age: 85 (M)   MR#: [id number]  Study Date: 11/16/2020  Location: [place] [id number]: Gilbert Delvalle RDCS  Study quality: Technically good  Referring Physician:  LISA ESQUIVEL MD  Blood Pressure:104/68 mmHg  Height: 178 cm  Weight: 77 kg  BSA: 2 m2  ------------------------------------------------------------------------    PROCEDURE: Transthoracic echocardiogram with 2-D, M-Mode  and complete spectral and color flow Doppler.  INDICATION: Edema, unspecified (R60.9)  HISTORY:  ------------------------------------------------------------------------  DIMENSIONS:  Dimensions:     Normal Values:  LA:     4.4 cm    2.0 - 4.0 cm  Ao:     3.4 cm    2.0 - 3.8 cm  SEPTUM: 1.1 cm    0.6 - 1.2 cm  PWT:    1.0 cm    0.6 - 1.1 cm  LVIDd:  4.1 cm    3.0 - 5.6 cm  LVIDs:  3.0 cm    1.8 - 4.0 cm    Derived Variables:  LVMI: 73 g/m2  RWT: 0.48  Ejection Fraction Visual Estimate: 50-55 %  Ejection Fraction Traore: 59 %    ------------------------------------------------------------------------  OBSERVATIONS:  Mitral Valve: Normal mitral valve. Moderate mitral  regurgitation.  Aortic Root: Aortic Root: 3.4 cm.    Aortic Valve: Calcified trileaflet aortic valve with normal  opening.  Left Atrium:Mildly dilated left atrium.  LA volume index =  36 cc/m2.  Left Ventricle: Normal Left Ventricular Systolic Function,  (EF = 50-55%) Normal left ventricular internal dimensions  and wall thicknesses. Grade II diastolic dysfunction.  Right Heart: Normal right atrium. Normal right ventricular  size and function. There is mild tricuspid regurgitation.  There is mild pulmonic regurgitation.  Pericardium/PleuraNormal pericardium with no pericardial  effusion.  Hemodynamic: RV systolic pressure is normal at  29 mm Hg.  ------------------------------------------------------------------------  CONCLUSIONS:  1. Normal mitral valve. Moderate mitral regurgitation.  2. Calcified trileaflet aortic valve with normal opening.  3. Aortic Root: 3.4 cm.  4. Mildly dilated left atrium.  LA volume index = 36 cc/m2.  5. Normal left ventricular internal dimensions and wall  thicknesses.  6. Normal Left Ventricular Systolic Function,  (EF =  50-55%)  7. Grade II diastolic dysfunction.  8. Normal right atrium.  9. Normal right ventricular size and function.  10. RV systolic pressure is normal at  29 mm Hg.  11. There is mild tricuspid regurgitation.  12. There is mild pulmonic regurgitation.  13. Normal pericardium with no pericardial effusion.  < end of copied text >      ADVANCE DIRECTIVES:    HPI:  Pt is a 84 yo male with PMHx of CAD with stent, Afib on Eliquis, HTN, HLD, Gout, who presented to ED for generalized weakness x 2 weeks, generalized deteriorating progressively, and worsening cough. Son also reports that the patient's mental status has been slowly deteriorating. Per son, patient was discharged from [facility] 2.5 weeks ago where he was diagnosed with acute renal failure with a creatinine of 5 and PNA. Ever since discharge, the patient has been able to barely move with persistent productive cough with yellow/green sputum. Since yesterday, patient has been unable to get out of bed. Denies any nausea, vomiting, abdominal pain, dysuria, hematuria, or any other complaints. Pt currently on bipap and unable to provide any history, information obtained from ER attending and patient chart.     BRIEF HOSPITAL COURSE:  Patient admitted to ICU for acute respiratory failure - failed on bipap and was intubated. Patient was started on emergent HD. Remains sedated/intubated, on pressor, Cr 3.56. Request to establish goals of care.     PAST MEDICAL & SURGICAL HISTORY:  HLD (hyperlipidemia)  HTN (hypertension)  Afib  Gout  CAD (coronary artery disease)  No significant past surgical history    SOCIAL HISTORY:    Admitted from:  home  Substance abuse history: none  Tobacco hx:   former, quit 25+ years ago               Alcohol hx: none            Home Opioid hx: none  Bahai:  Confucianism                                  Preferred Language: Mandarin    Surrogate/HCP/Guardian:    Melanie Simpson (wife): [phone]  Cristhian Rooney (son): [phone]    FAMILY HISTORY: No significant family history reported.     Baseline ADLs (prior to admission): Per report, patient with + generalize weakness, confusion/hallucinations     No Known Allergies    Present Symptoms:       Review of Systems:  Unable to obtain due to poor mentation    MEDICATIONS  (STANDING):  albumin human 25% IVPB 100 milliLiter(s) IV Intermittent every 6 hours  ALBUTerol    90 MICROgram(s) HFA Inhaler 2 Puff(s) Inhalation every 6 hours  chlorhexidine 0.12% Liquid 15 milliLiter(s) Oral Mucosa every 12 hours  chlorhexidine 2% Cloths 1 Application(s) Topical <User Schedule>  fentaNYL   Infusion. 1.503 MICROgram(s)/kG/Hr (11.5 mL/Hr) IV Continuous <Continuous>  finasteride 5 milliGRAM(s) Oral daily  heparin  Infusion.  Unit(s)/Hr (14 mL/Hr) IV Continuous <Continuous>  norepinephrine Infusion 0.851 MICROgram(s)/kG/Min (61 mL/Hr) IV Continuous <Continuous>  pantoprazole  Injectable 40 milliGRAM(s) IV Push daily  piperacillin/tazobactam IVPB.. 3.375 Gram(s) IV Intermittent every 12 hours  polyethylene glycol 3350 17 Gram(s) Oral daily  ranolazine 500 milliGRAM(s) Oral two times a day  senna 2 Tablet(s) Oral at bedtime  sodium bicarbonate 650 milliGRAM(s) Oral every 8 hours  tamsulosin 0.4 milliGRAM(s) Oral at bedtime    MEDICATIONS  (PRN):      PHYSICAL EXAM:    Vital Signs Last 24 Hrs  T(C): 36.7 (18 Nov 2020 07:00), Max: 37.3 (18 Nov 2020 04:30)  T(F): 98.1 (18 Nov 2020 07:00), Max: 99.1 (18 Nov 2020 04:30)  HR: 122 (18 Nov 2020 09:00) (93 - 141)  BP: 120/61 (18 Nov 2020 09:00) (92/71 - 157/61)  BP(mean): 75 (18 Nov 2020 09:00) (56 - 120)  RR: 20 (18 Nov 2020 09:00) (16 - 24)  SpO2: 100% (18 Nov 2020 09:00) (92% - 100%)    General: Patient sedated/intubated, on pressor. No signs of distress.   Karnofsky Performance Score/Palliative Performance Status Version2:     10%    HEENT:  dry lips, ET tube   Lungs: comfortable, on ventilator. Continues fentanyl     CV: S1S2, + tachycardia, on pressor  GI:   incontinent, NGT  :  robbins, on HD  Musculoskeletal: sedated/intubated, dependent on ADLs  Skin: + edema  Neuro: patient sedated/intubated, dependent on ADLs  Oral intake ability: unable/only mouth care   Diet: NPO; TF via NGT    LABS:                        8.5    13.88 )-----------( 90       ( 18 Nov 2020 03:28 )             25.7     11-18    138  |  104  |  30<H>  ----------------------------<  80  3.3<L>   |  24  |  3.56<H>    Ca    7.1<L>      18 Nov 2020 03:28  Phos  2.4     11-18  Mg     2.0     11-18    TPro  5.7<L>  /  Alb  2.5<L>  /  TBili  0.9  /  DBili  x   /  AST  51<H>  /  ALT  39  /  AlkPhos  69  11-18        RADIOLOGY & ADDITIONAL STUDIES:  < from: CT Chest No Cont (11.14.20 @ 04:12) >  EXAM:  CT ABDOMEN AND PELVIS                        EXAM:  CT CHEST                        PROCEDURE DATE:  11/14/2020    INTERPRETATION:  CT of the chest, abdomen and pelvis  Indication: [lung mass, assess treatment response  Technique: Axial images were obtained from the thoracic inlet through pubic symphysis without oral or IV contrast. Reformatted coronal and sagittal images were submitted.  Comparison: None  FINDINGS:  Evaluation of the solid abdominal organs is limited without contrast.  The visualized neck, axilla and subcutaneous tissues are unremarkable.  Status post endotracheal tube placement, which terminates at the level of the clavicular heads. The tracheobronchial tree is patent centrally.  There is no mediastinal hematoma.  The great vessels are not enlarged. Coronary atherosclerosis. There is no significant mediastinal or hilar adenopathy.  The heart is moderately enlarged.  There is no pericardial effusion.  Lungs: Fibrotic scarring at thelung apices. Patchy airspace, groundglass, and interstitial opacities throughout the lungs, left greater than right are compatible with multifocal pneumonia. Lungs bases demonstrate dense consolidations. Scattered areas of patchy opacity, inclusive of 1.9 cm right middle lobe opacity may be followed up in 6 weeks.  Pleura: Trace left pleural effusion.  There is no free air or focal collection.  Small amount of free fluid.  Liver: Normal.  Spleen: Normal.  Gallbladder: Unremarkable.  Biliary tree: Unremarkable. Is unchanged  Adrenal glands: Normal.  Pancreas: Normal.  Kidneys: Small bilateral renal cysts. No hydronephrosis or obstructing stone.  Bowel:  There is no small bowel obstruction.  Oral contrast is seen as distally as the rectum. The appendix is unremarkable. No significant fecal load. Sigmoid diverticulosis.  Bladder: Decompressed by Robbins catheter.  Pelvic organs: The prostate is not enlarged.  There is no significant adenopathy.  Vasculature: The aorta is not dilated.Moderate atherosclerotic vascular calcification.  Left groin femoral vein approach central venous catheter with tip terminating in the common iliac vein.  Retroperitoneum: There is no mass.  Bones: Unremarkable.  Subcutaneous tissues: Diffuse subcutaneous edema.    IMPRESSION:  Multifocal pneumonia. Small left pleural effusion. Recommend follow-up to resolution.  Cardiomegaly.  No retroperitoneal hematoma.  Subcutaneous edema.  < end of copied text >    < from: CT Head No Cont (11.12.20 @ 10:18) >  EXAM:  CT BRAIN                        PROCEDURE DATE:  11/12/2020    INTERPRETATION:  HISTORY: Altered mental status  Evaluation demonstrates no evidence of mass-effect or midline shift. No intraparenchymal mass lesions or hemorrhage is identified. There is no evidence of hydrocephalus. No extra-axial collections are noted.  The bone windows demonstrate no gross osseous abnormalities.    Impression:  1. Unremarkable noncontrast CT scan of the brain.  < end of copied text >    < from: Xray Chest 1 View- PORTABLE-Routine (Xray Chest 1 View- PORTABLE-Routine in AM.) (11.17.20 @ 07:54) >  EXAM:  XR CHEST PORTABLE ROUTINE 1V                        PROCEDURE DATE:  11/17/2020    INTERPRETATION:  CLINICAL INDICATION: 85 years  Male with icu f/u.  COMPARISON: 11/16/2020  The tip the ET tube is at the clavicular heads. The left jugular catheter tip overlies the superior vena cava. The tip of the enteric catheter is below the diaphragm.  AP view of the chest demonstrates persistent fibrotic changes, most pronounced in the lung bases. There is an increasing left pleuraleffusion and a small right pleural effusion. Cannot exclude underlying infiltrate or atelectasis.  The heart, mediastinum and orion cannot be assessed due to rotation. The aorta is atherosclerotic. The right hilum is normal.  The pulmonary vasculature is normal.  Mild thoracic degenerative changes are present.    IMPRESSION:  Increasing bilateral pleural effusions, greater on the left. Cannot exclude underlying infiltrate or atelectasis.  Chronic fibrotic changes.  Tubes and catheters in satisfactory position.  < end of copied text >    < from: Transthoracic Echocardiogram (11.16.20 @ 10:27) >  Patient name: SHWETHA ROONEY  YOB: 1935   Age: 85 (M)   MR#: [id number]  Study Date: 11/16/2020  Location: [place] [id number]: Gilbert Delvalle RDCS  Study quality: Technically good  Referring Physician:  LISA ESQUIVEL MD  Blood Pressure:104/68 mmHg  Height: 178 cm  Weight: 77 kg  BSA: 2 m2  ------------------------------------------------------------------------    PROCEDURE: Transthoracic echocardiogram with 2-D, M-Mode  and complete spectral and color flow Doppler.  INDICATION: Edema, unspecified (R60.9)  HISTORY:  ------------------------------------------------------------------------  DIMENSIONS:  Dimensions:     Normal Values:  LA:     4.4 cm    2.0 - 4.0 cm  Ao:     3.4 cm    2.0 - 3.8 cm  SEPTUM: 1.1 cm    0.6 - 1.2 cm  PWT:    1.0 cm    0.6 - 1.1 cm  LVIDd:  4.1 cm    3.0 - 5.6 cm  LVIDs:  3.0 cm    1.8 - 4.0 cm    Derived Variables:  LVMI: 73 g/m2  RWT: 0.48  Ejection Fraction Visual Estimate: 50-55 %  Ejection Fraction Traore: 59 %    ------------------------------------------------------------------------  OBSERVATIONS:  Mitral Valve: Normal mitral valve. Moderate mitral  regurgitation.  Aortic Root: Aortic Root: 3.4 cm.    Aortic Valve: Calcified trileaflet aortic valve with normal  opening.  Left Atrium:Mildly dilated left atrium.  LA volume index =  36 cc/m2.  Left Ventricle: Normal Left Ventricular Systolic Function,  (EF = 50-55%) Normal left ventricular internal dimensions  and wall thicknesses. Grade II diastolic dysfunction.  Right Heart: Normal right atrium. Normal right ventricular  size and function. There is mild tricuspid regurgitation.  There is mild pulmonic regurgitation.  Pericardium/PleuraNormal pericardium with no pericardial  effusion.  Hemodynamic: RV systolic pressure is normal at  29 mm Hg.  ------------------------------------------------------------------------  CONCLUSIONS:  1. Normal mitral valve. Moderate mitral regurgitation.  2. Calcified trileaflet aortic valve with normal opening.  3. Aortic Root: 3.4 cm.  4. Mildly dilated left atrium.  LA volume index = 36 cc/m2.  5. Normal left ventricular internal dimensions and wall  thicknesses.  6. Normal Left Ventricular Systolic Function,  (EF =  50-55%)  7. Grade II diastolic dysfunction.  8. Normal right atrium.  9. Normal right ventricular size and function.  10. RV systolic pressure is normal at  29 mm Hg.  11. There is mild tricuspid regurgitation.  12. There is mild pulmonic regurgitation.  13. Normal pericardium with no pericardial effusion.  < end of copied text >      ADVANCE DIRECTIVES:

## 2020-11-18 NOTE — CONSULT NOTE ADULT - PROBLEM SELECTOR RECOMMENDATION 5
Utilized  ID#[id number] - Left message for patient's wife to further discuss status, goals of care, and confirm surrogate. Will continue to follow. At this time, patient remains a FULL CODE.

## 2020-11-18 NOTE — CONSULT NOTE ADULT - PROBLEM SELECTOR RECOMMENDATION 4
Per report, patient with + generalized weakness, confusion/hallucinations. Patient now with multi-organ failure, remains sedated/intubated, on pressor, continues emergent HD.

## 2020-11-18 NOTE — CONSULT NOTE ADULT - ATTENDING COMMENTS
85 y M with CAD, Afib, recent hospitalization for VASQUEZ, now under ICU care for acute hypoxic resp failure, started on HD for vol overload. CT shows multifocal PNA. Intubated/sedated, on pressor support. Full code on file. Palliative team will follow for GOC discussion w patient's wife. Guarded prognosis.

## 2020-11-19 NOTE — PROGRESS NOTE ADULT - ASSESSMENT
a 84 yo male with PMHx of CAD with stent, Afib on Eliquis, HTN, HLD, Gout who presented with weakness and shortness of breath. Pt is being admitted to ICU for hypoxic respiratory failure requiring BIPAP support.    1.Hypoxic Resp failure due to pneumonia and fluid overload  2. VASQUEZ vs CKD  3.Acute encephalopathy  4.Afib on Eliquis  5.HTN  6.BPH  7.Anemia  8. thrombocytopenia    =====CNS=====  #intubated and sedated since     #Acute Encephalopathy vs worsening dementia  - CT head on admission negative for any acute stroke  - remain sedated with precedex    =====CVS=====  #Afib, on eliquis at home  - EKG afib, no acute ischemic changes  - hold heparin drip due to decreasing platelet count    #CAD  - h/o CAD, on no meds    =====Pulm=====  #acute pulmonary edema, likely 2/2 fluid overload from worsening VASQUEZ  - echo (10/22) normal EF, mild-mod MR and mild tricuspid regurgitation  - c/w temporary HD daily    #acute hypoxic respiratory failure, likely 2/2 fluid overload & PNA  - intubated on 2020 for hypoxia     =====GI=====  #no acute issues    =====Renal=====Dr. Caputo  #VASQUEZ  - baseline Cr of around 2.7 on   - Cr 4.19 () -> 3.56  - AB.3/48/49/24  - c/w HD (d4), Nabicarb 650mg PO q8h  - strict I/O monitoring    #Anasarca  - fluid overload  - alb 2.5 (trending up)  - c/w albumin 100ml q6h x8    =====ID=====  #possible PNA  - Scx, Bcx, and Ucx neg  - COVID neg  - CXR increasing bilateral pleural effusions, greater on the left. Cannot exclude underlying infiltrate or atelectasis  - c/w zosyn x7d (d 7 of )    =====Heme=====  #Anemia, likely 2/2 hematuria  - resolved  - hgb 9.2 () -> 8.5  - s/p 3u pRBC  - will transfuse if <7    #Thrombocytopenia  - platelet noted to be trending down  - plt 90 ()  - hold heparin drip  - monitor platelet count  - f/u heparin antibody    =====Skin/Catheters=====  #no acute issues    =====Ppx  Feeding/fluid: nephro 40ml x 24hr  Analgesic: none  Sedation: none  Thrombolytics: heparin drip  Head elevation: yes  Ulcer, stress: PPI 40mg IVP  Glycemic control: none  Bowel regimen: senna, miralax  Indwelling catheter: yes  Drug de-escalation: fentanyl d/c'd   a 86 yo male with PMHx of CAD with stent, Afib on Eliquis, HTN, HLD, Gout who presented with weakness and shortness of breath. Pt is being admitted to ICU for hypoxic respiratory failure requiring BIPAP support.    1.Hypoxic Resp failure due to pneumonia and fluid overload  2. VASQUEZ vs CKD  3.Acute encephalopathy  4.Afib on Eliquis  5.HTN  6.BPH  7.Anemia  8. thrombocytopenia    =====CNS=====  #intubated and sedated since     #Acute Encephalopathy vs worsening dementia  - CT head on admission negative for any acute stroke  - remain sedated with precedex    =====CVS=====  #Afib, on eliquis at home  - EKG afib, no acute ischemic changes  - hold heparin drip due to decreasing platelet count    #CAD  - h/o CAD, on no meds    =====Pulm=====  #acute pulmonary edema, likely 2/2 fluid overload from worsening VASQUEZ  - echo (10/22) normal EF, mild-mod MR and mild tricuspid regurgitation  - c/w temporary HD daily    #acute hypoxic respiratory failure, likely 2/2 fluid overload & PNA  - intubated on 2020 for hypoxia     =====GI=====  #no acute issues    =====Renal=====Dr. Caputo  #VASQUEZ  - baseline Cr of around 2.7 on   - Cr 4.19 () -> 3.56  - AB.3/48/49/24  - c/w HD (d4), Nabicarb 650mg PO q8h  - strict I/O monitoring    #Anasarca  - fluid overload  - alb 2.5 (trending up)  - s/p albumin 100ml q6h x8    =====ID=====  #possible PNA  - Scx, Bcx, and Ucx neg  - COVID neg  - CXR increasing bilateral pleural effusions, greater on the left. Cannot exclude underlying infiltrate or atelectasis  - c/w zosyn x7d (d7 of 7)    #possible sepsis  - RR 30 on vent, temp 37.5C, HR wnl, band >10%  - BP in 90's  - possible sources: shiley catheter, ETT  - s/p vancomycin x1 and zyosin  - c/w vasopressin and levophed  - no fluid as he is fluid overloaded    =====Heme=====  #Anemia, likely 2/2 hematuria  - resolved  - hgb 9.2 () -> 8.5 ->  - s/p 3u pRBC  - will transfuse if <7    #Thrombocytopenia  - platelet noted to be trending down  - plt 90 ()  - hold heparin drip  - monitor platelet count  - f/u heparin antibody    =====Skin/Catheters=====  #no acute issues    =====Ppx  Feeding/fluid: nephro 40ml x 24hr  Analgesic: none  Sedation: none  Thrombolytics: heparin drip  Head elevation: yes  Ulcer, stress: PPI 40mg IVP  Glycemic control: none  Bowel regimen: senna, miralax  Indwelling catheter: yes  Drug de-escalation: fentanyl d/c'd   a 84 yo male with PMHx of CAD with stent, Afib on Eliquis, HTN, HLD, Gout who presented with weakness and shortness of breath. Pt is being admitted to ICU for hypoxic respiratory failure requiring BIPAP support.    1.Hypoxic Resp failure due to pneumonia and fluid overload  2. VASQUEZ vs CKD  3.Acute encephalopathy  4.Afib on Eliquis  5.HTN  6.BPH  7.Anemia  8. thrombocytopenia    =====CNS=====  #intubated and sedated since     #Acute Encephalopathy vs worsening dementia  - CT head on admission negative for any acute stroke  - remain sedated with precedex    =====CVS=====  #Afib, on eliquis at home  - EKG afib, no acute ischemic changes  - hold heparin drip due to decreasing platelet count    #CAD  - h/o CAD, on no meds    =====Pulm=====  #acute pulmonary edema, likely 2/2 fluid overload from worsening VASQUEZ  - echo (10/22) normal EF, mild-mod MR and mild tricuspid regurgitation  - c/w temporary HD daily    #acute hypoxic respiratory failure, likely 2/2 fluid overload & PNA  - intubated on 2020 for hypoxia     =====GI=====  #no acute issues    =====Renal=====Dr. Caputo  #VASQUEZ  - baseline Cr of around 2.7 on   - Cr 4.19 () -> 3.56  - AB.3/48/49/24  - c/w HD (d4), Nabicarb 650mg PO q8h  - strict I/O monitoring    #Anasarca  - fluid overload  - alb 2.5 (trending up)  - s/p albumin 100ml q6h x8    =====ID=====  #possible PNA  - Scx, Bcx, and Ucx neg  - COVID neg  - CXR increasing bilateral pleural effusions, greater on the left. Cannot exclude underlying infiltrate or atelectasis  - c/w zosyn x7d (d7 of 7)    #possible sepsis  - RR 30 on vent, temp 37.5C, HR wnl, band >10%  - BP in 90's  - possible sources: shiley catheter, ETT  - s/p vancomycin x1 and zyosin  - c/w vasopressin and levophed  - no fluid as he is fluid overloaded    =====Heme=====  #Anemia, likely 2/2 hematuria  - resolved  - hgb 9.2 () -> 8.5 ->8.1  - s/p 3u pRBC  - will transfuse if <7    #Thrombocytopenia  - platelet noted to be trending down  - plt 90 ()  - hold heparin drip  - monitor platelet count  - f/u     =====Skin/Catheters=====  #no acute issues    =====Ppx  Feeding/fluid: nephro 40ml x 24hr  Analgesic: none  Sedation: none  Thrombolytics: heparin  Head elevation: yes  Ulcer, stress: PPI 40mg IVP  Glycemic control: none  Bowel regimen: senna, miralax  Indwelling catheter: yes  Drug de-escalation: precedex titrated down   a 84 yo male with PMHx of CAD with stent, Afib on Eliquis, HTN, HLD, Gout who presented with weakness and shortness of breath. Pt is being admitted to ICU for hypoxic respiratory failure requiring BIPAP support.    1.Hypoxic Resp failure due to pneumonia and fluid overload  2. VASQUEZ vs CKD  3.Acute encephalopathy  4.Afib on Eliquis  5.HTN  6.BPH  7.Anemia  8. thrombocytopenia    =====CNS=====  #intubated and sedated since 11/13    #Acute Encephalopathy vs worsening dementia  - CT head on admission negative for any acute stroke  - remain sedated with precedex    =====CVS=====  #Afib, on eliquis at home  - EKG afib, no acute ischemic changes  - hold heparin drip due to decreasing platelet count    #CAD  - h/o CAD, on no meds    =====Pulm=====  #acute pulmonary edema, likely 2/2 fluid overload from worsening VASQUEZ  - echo (10/22) normal EF, mild-mod MR and mild tricuspid regurgitation  - c/w temporary HD daily    #acute hypoxic respiratory failure, likely 2/2 fluid overload & PNA  - intubated on 11/13/2020 for hypoxia     =====GI=====  #no acute issues    =====Renal=====Dr. Caputo  #VASQUEZ  - baseline Cr of around 2.7 on 05/20  - Cr 4.19 (11/17) -> 3.56  - s/p Nabicarb 650mg PO q8h  - c/w HD (d4)  - strict I/O monitoring    #Anasarca  - fluid overload  - alb 2.5 (trending up)  - s/p albumin 100ml q6h x8    =====ID=====  #possible PNA  - Scx, Bcx, and Ucx neg  - COVID neg  - CXR increasing bilateral pleural effusions, greater on the left. Cannot exclude underlying infiltrate or atelectasis  - c/w zosyn x7d (d7 of 7)    #possible sepsis  - RR 30 on vent, temp 37.5C, HR wnl, band >10%  - BP in 90's  - possible sources: shiley catheter, ETT  - s/p vancomycin x1 and zyosin  - c/w vasopressin and levophed  - no fluid as he is fluid overloaded  - f/u procalcitoni, ekg    =====Heme=====  #Anemia, likely 2/2 hematuria  - resolved  - hgb 9.2 (11/17) -> 8.5 ->8.1  - s/p 3u pRBC  - will transfuse if <7    #Thrombocytopenia  - platelet noted to be trending down  - plt 90 (11/18)  - heparin ab neg  - hold heparin drip  - monitor platelet count  - f/u serotonin assay, LDH, haptoglobin, fibrinogen, bilirubin, B12, folate    =====Skin/Catheters=====  #no acute issues    =====Ppx  Feeding/fluid: nephro 40ml x 24hr  Analgesic: none  Sedation: none  Thrombolytics: heparin IVP q12  Head elevation: yes  Ulcer, stress: PPI 40mg IVP  Glycemic control: none  Bowel regimen: senna, miralax  Indwelling catheter: yes  Drug de-escalation: precedex titrated down

## 2020-11-19 NOTE — PROGRESS NOTE ADULT - CONVERSATION DETAILS
11/19/2020  Patient's wife refers his son for all healthcare decisions. Patient's son, Cristhian, emphasizes that his father 11/19/2020  Patient's wife refers his son for all healthcare decisions. Patient's son, Cristhian, emphasizes that his father would have want to have 11/19/2020  Patient's wife refers his son for all healthcare decisions. Patient's son, Cristhian, emphasizes that his father would not have wanted to be on tracheostomy tube and PEG. Mr. Cristhian Rooney also expressed that he wants DNR for his father.

## 2020-11-19 NOTE — PROGRESS NOTE ADULT - PROBLEM SELECTOR PLAN 1
2/2 multifocal PNA/fluid overload 2/2 VASQUEZ on CKD; involving lungs (patient intubated); heart (on pressor, G2DD on echo); kidneys (VASQUEZ, Cr 3.56 - baseline Cr 2.7 on 5/20, started on emergent HD). WBC 13.88. CXR indicates Increasing bilateral pleural effusions, greater on the left and chronic fibrotic changes. Patient remains sedated/intubated, on IV abx, pressor, albumin. Patient with poor prognosis. Full code. 2/2 multifocal PNA/fluid overload 2/2 VASQUEZ on CKD; involving lungs (patient intubated); heart (on pressor, G2DD on echo); kidneys (VASQUEZ, Cr 3.61 - baseline Cr 2.7 on 5/20, started on emergent HD). Patient remains sedated/intubated, on IV abx, 2 pressors. Patient with poor prognosis. 2/2 multifocal PNA/fluid overload 2/2 VASQUEZ on CKD; involving lungs (patient intubated); heart (on 2 pressors, G2DD on echo); kidneys (VASQUEZ, Cr 3.61 - baseline Cr 2.7 on 5/20, started on emergent HD). CXR indicates Bilateral lung parenchymal interstitial and airspace opacities are redemonstrated, stable on the left and increasing on the right. Patient remains sedated/intubated, on IV abx, 2 pressors. Patient with poor prognosis. Son/Cristhian is primary surrogate. MOLST: DNR / trial intubation /  no trach or peg.

## 2020-11-19 NOTE — PROGRESS NOTE ADULT - SUBJECTIVE AND OBJECTIVE BOX
Dr. Vaughan  Office [phone]  Cell [phone]  Pallavi GRAJEDA  Cell [phone]    RENAL PROGRESS NOTE: DATE OF SERVICE 11-19-20 @ 18:05    Patient is a 85y old  Male who presents with a chief complaint of Shortness of breath (19 Nov 2020 12:36)      Patient seen and examined at bedside in ICU. Remain intubated, on vasopressure    VITALS:  T(F): 100 (11-19-20 @ 16:00), Max: 100.2 (11-19-20 @ 05:30)  HR: 94 (11-19-20 @ 18:00)  BP: 115/57 (11-19-20 @ 18:00)  RR: 18 (11-19-20 @ 18:00)  SpO2: 100% (11-19-20 @ 18:00)  Wt(kg): --    11-18 @ 07:01  -  11-19 @ 07:00  --------------------------------------------------------  IN: 2725.1 mL / OUT: 2520 mL / NET: 205.1 mL    11-19 @ 07:01  -  11-19 @ 18:05  --------------------------------------------------------  IN: 660.1 mL / OUT: 10 mL / NET: 650.1 mL          PHYSICAL EXAM:  Constitutional: Intubated, unresponsive  Neck: No JVD  Respiratory: bilateral basal crackles_+  Cardiovascular: S1, S2, RRR  Gastrointestinal: BS+, soft, ND  Extremities: 3+ peripheral edema    Hospital Medications:   MEDICATIONS  (STANDING):  ALBUTerol    90 MICROgram(s) HFA Inhaler 2 Puff(s) Inhalation every 6 hours  chlorhexidine 0.12% Liquid 15 milliLiter(s) Oral Mucosa every 12 hours  chlorhexidine 2% Cloths 1 Application(s) Topical <User Schedule>  dexMEDEtomidine Infusion 0.5 MICROgram(s)/kG/Hr (9.56 mL/Hr) IV Continuous <Continuous>  finasteride 5 milliGRAM(s) Oral daily  heparin   Injectable 5000 Unit(s) SubCutaneous every 12 hours  norepinephrine Infusion 0.1 MICROgram(s)/kG/Min (7.17 mL/Hr) IV Continuous <Continuous>  pantoprazole  Injectable 40 milliGRAM(s) IV Push daily  piperacillin/tazobactam IVPB.. 3.375 Gram(s) IV Intermittent every 12 hours  polyethylene glycol 3350 17 Gram(s) Oral daily  senna 2 Tablet(s) Oral at bedtime  tamsulosin 0.4 milliGRAM(s) Oral at bedtime  vasopressin Infusion 0.04 Unit(s)/Min (2.4 mL/Hr) IV Continuous <Continuous>      LABS:  11-19    136  |  101  |  33<H>  ----------------------------<  111<H>  3.1<L>   |  26  |  3.61<H>    Ca    7.3<L>      19 Nov 2020 06:10  Phos  1.0     11-19  Mg     2.1     11-19    TPro  5.2<L>  /  Alb  2.4<L>  /  TBili  0.9  /  DBili  0.4<H>  /  AST  35  /  ALT  27  /  AlkPhos  113  11-19    Creatinine Trend: 3.61 <--, 3.56 <--, 4.19 <--, 4.04 <--, 4.19 <--, 2.77 <--, 3.51 <--, 2.97 <--, 4.21 <--, 3.27 <--, 3.03 <--, 5.96 <--    Albumin, Serum: 2.4 g/dL (11-19 @ 06:10)  Phosphorus Level, Serum: 1.0 mg/dL (11-19 @ 06:10)                              8.1    9.71  )-----------( 75       ( 19 Nov 2020 06:10 )             24.3     Urine Studies:  Urinalysis - [11-12-20 @ 18:04]      Color Yellow / Appearance Clear / SG 1.025 / pH 5.0      Gluc Negative / Ketone Negative  / Bili Negative / Urobili Negative       Blood Moderate / Protein 100 / Leuk Est Trace / Nitrite Negative      RBC 5-10 / WBC 3-5 / Hyaline 0-2 / Gran 5-10 / Sq Epi  / Non Sq Epi Few / Bacteria Few    Urine Creatinine 88      [11-13-20 @ 13:49]  Urine Protein 218      [11-13-20 @ 13:49]  Urine Sodium 41      [11-13-20 @ 13:49]  Urine Phosphorus See Note      [11-15-20 @ 07:08]  Urine Osmolality 284      [11-13-20 @ 13:49]    Iron 24, TIBC 142, %sat 17      [11-13-20 @ 13:38]  TSH 2.01      [11-13-20 @ 06:39]  Lipid: chol 172, TG 83, HDL 46, LDL --      [11-13-20 @ 06:39]    HBsAg Nonreact      [11-14-20 @ 10:25]  HCV 0.15, Nonreact      [11-14-20 @ 10:25]    JAI: titer Negative, pattern --      [11-13-20 @ 20:35]  C3 Complement 52      [11-13-20 @ 22:30]  C4 Complement 20      [11-13-20 @ 22:30]  ANCA: cANCA Negative, pANCA Negative, atypical ANCA Indeterminate      [11-13-20 @ 20:35]  anti-GBM <1.0      [11-13-20 @ 21:37]  Syphilis Screen (Treponema Pallidum Ab) Negative      [11-13-20 @ 20:35]  Free Light Chains: kappa 14.62, lambda 10.97, ratio = 1.33      [11-13 @ 22:30]  Immunofixation Serum:   No Monoclonal Band Identified    Reference Range: None Detected      [11-13-20 @ 22:30]  SPEP Interpretation: Hypoalbuminemia      [11-13-20 @ 22:30]    RADIOLOGY & ADDITIONAL STUDIES:

## 2020-11-19 NOTE — PROGRESS NOTE ADULT - CONVERSATION DETAILS
11/19/20: Utilized  ID#[id number] - left message for patient's wife/Melanie Simpson with request to return call. 11/19/20: Utilized  ID#[id number] - left message for patient's wife/Melanie Simpson with request to return call.  Called patient's son/Cristhian; PC PENNY and ICU resident present. Son reports patient's wife/Melanie is present. Spoke with Melanie - she confirms she is patient's wife and is deferring medical decision making to her son/Cristhian, stating "He takes care of everything." Again spoke with Cristhian - updated status; reviewed medications, labs. Aware patient on 2 pressors, intubated, on HD; acknowledges overall prognosis is poor. 11/19/20: Utilized  ID#[id number] - left message for patient's wife/Melanie Simpson with request to return call.  Called patient's son/Cristhian; PC PENNY and ICU resident present. Son reports patient's wife/Melanie is present. Spoke with Melanie - she confirms she is patient's wife and is deferring medical decision making to her son/Cristhian, stating "He takes care of everything." Again spoke with Cristhian - updated status; reviewed medications, labs. Aware patient on 2 pressors, intubated, on HD; acknowledges overall prognosis is poor. Discussed risks vs benefits of resuscitation, intubation, artificial nutrition. Son states his priority is that patient has a "good quality of life." Requests DNR status and states patient would never want trach or PEG. MOLST completed as per son's wishes:  DNR / trial intubation/ trial feeding tube / trial IV fluids / use abx / NO TRACH OR PEG. All questions answered; supportive counseling provided.

## 2020-11-19 NOTE — PROGRESS NOTE ADULT - ASSESSMENT
Pt is a 86 yo male with PMHx of CAD with stent, Afib on Eliquis, HTN, HLD, Gout, who presented to ED for generalized weakness for the past 2 weeks, generalized deteriorating progressively, and worsening cough. Son also reports that the patient's mental status has been slowly deteriorating. Admitted with fluid overload.    A/P:  Renal failure:  Acute on CKD    Etiology? likely ATN  d/w his outpatient nephrologist Dr. Calderon office, covering Nephrologist spoke to me  In May 2020 his Scr was 2.8  He was recently hospitalized in 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  Marginal vitals making it difficult to remove much fluid, still fluid overloaded  Will dialyze almost daily till euvolemic, still very low urine output  Monitor I/O  Daily weight  D/W his son has consent for HD      Proteinuria:  Etiology?  Urine protein/cr ratio 2.4gm  Follow up vasculitis work up. C3 is low  Has negative JAI, C4, Hep C, HBsAg,  serum free light chain, RPR,  ANCA, anti-GBM, SPEP, Serum immunofixation    Hyponatremia:  Likely sec to fluid overload  Ruled out SIADH  Better     Acidosis:  Both rep and metabolic-Non-AG  Improved  Vent management per ICU    Hypophosphatemia:  supplement K-Phos 15mmol IV one dose    Anemia:  Etiology?  Iron study suggest iron deficiency  Can't give IV iron in view of infection  Get B12, folate  Transfuse PRN to keep Hb >8.0  Monitor Hb

## 2020-11-19 NOTE — PROGRESS NOTE ADULT - PROBLEM SELECTOR PROBLEM 1
Treatment Plan:    Start Wellbutrin (buproprion)  mg daily in AM and Noon for depression.     Contact me in about 2-4 weeks and we may consider dose increase if needed.     Continue all other medications as reviewed per electronic medical record today.     Safety plan reviewed. To the Emergency Department as needed or call after hours crisis line at [phone] or [phone]. [place] Crisis Text Line: Text [place] to 568994  or  Suicide LifeLine Chat: suicidepreventionlifeline.org/chat    To schedule individual or family therapy, call [facility] at [phone].     Schedule an appointment with me in 6-8 weeks or sooner as needed.     Follow up with primary care provider as planned or sooner if needed for acute medical concerns.    Call the psychiatric nurse line with medication questions or concerns at [phone].    [employer] may be used to communicate with your provider, but this is not intended to be used for emergencies.   Multi-organ failure with heart failure

## 2020-11-19 NOTE — GOALS OF CARE CONVERSATION - ADVANCED CARE PLANNING - TREATMENT GUIDELINES
DNR Order/Intubation trial/Artificial nutrition trial/IV fluid trial/No trach/no peg/ receiving HD/Antibiotic trial

## 2020-11-19 NOTE — PROGRESS NOTE ADULT - SUBJECTIVE AND OBJECTIVE BOX
INTERVAL HPI/OVERNIGHT EVENTS: Patient has difficulty weaning off the ventilator. Also, BP has been trending low; precedex has been titrated down, and started levophed and vasopressin.     REVIEW OF SYSTEMS:  - intubated and sedated      PRESSORS: [x] YES [] NO  WHICH: levophed and vasopressin    Antimicrobial:  piperacillin/tazobactam IVPB.. 3.375 Gram(s) IV Intermittent every 12 hours    Cardiovascular:  norepinephrine Infusion 0.1 MICROgram(s)/kG/Min IV Continuous <Continuous>  ranolazine 500 milliGRAM(s) Oral two times a day  tamsulosin 0.4 milliGRAM(s) Oral at bedtime    Pulmonary:  ALBUTerol    90 MICROgram(s) HFA Inhaler 2 Puff(s) Inhalation every 6 hours    Hematalogic:  heparin   Injectable 5000 Unit(s) SubCutaneous every 12 hours    Other:  chlorhexidine 0.12% Liquid 15 milliLiter(s) Oral Mucosa every 12 hours  chlorhexidine 2% Cloths 1 Application(s) Topical <User Schedule>  dexMEDEtomidine Infusion 0.5 MICROgram(s)/kG/Hr IV Continuous <Continuous>  finasteride 5 milliGRAM(s) Oral daily  pantoprazole  Injectable 40 milliGRAM(s) IV Push daily  polyethylene glycol 3350 17 Gram(s) Oral daily  senna 2 Tablet(s) Oral at bedtime  vasopressin Infusion 0.04 Unit(s)/Min IV Continuous <Continuous>    ALBUTerol    90 MICROgram(s) HFA Inhaler 2 Puff(s) Inhalation every 6 hours  chlorhexidine 0.12% Liquid 15 milliLiter(s) Oral Mucosa every 12 hours  chlorhexidine 2% Cloths 1 Application(s) Topical <User Schedule>  dexMEDEtomidine Infusion 0.5 MICROgram(s)/kG/Hr IV Continuous <Continuous>  finasteride 5 milliGRAM(s) Oral daily  heparin   Injectable 5000 Unit(s) SubCutaneous every 12 hours  norepinephrine Infusion 0.1 MICROgram(s)/kG/Min IV Continuous <Continuous>  pantoprazole  Injectable 40 milliGRAM(s) IV Push daily  piperacillin/tazobactam IVPB.. 3.375 Gram(s) IV Intermittent every 12 hours  polyethylene glycol 3350 17 Gram(s) Oral daily  ranolazine 500 milliGRAM(s) Oral two times a day  senna 2 Tablet(s) Oral at bedtime  tamsulosin 0.4 milliGRAM(s) Oral at bedtime  vasopressin Infusion 0.04 Unit(s)/Min IV Continuous <Continuous>    Drug Dosing Weight  Height (cm): 177.8 (12 Nov 2020 08:58)  Weight (kg): 76.5 (12 Nov 2020 08:58)  BMI (kg/m2): 24.2 (12 Nov 2020 08:58)  BSA (m2): 1.94 (12 Nov 2020 08:58)      CENTRAL LINE: [x] YES [] NO  LOCATION: [facility]    ESCOBAR: [x] YES [] NO        A-LINE:  [] YES [x] NO  LOCATION: N/A    ICU Vital Signs Last 24 Hrs  T(C): 37.9 (19 Nov 2020 05:30), Max: 37.9 (19 Nov 2020 05:30)  T(F): 100.2 (19 Nov 2020 05:30), Max: 100.2 (19 Nov 2020 05:30)  HR: 91 (19 Nov 2020 12:31) (91 - 133)  BP: 112/73 (19 Nov 2020 12:00) (64/36 - 140/60)  BP(mean): 82 (19 Nov 2020 12:00) (42 - 100)  ABP: --  ABP(mean): --  RR: 27 (19 Nov 2020 12:00) (16 - 29)  SpO2: 95% (19 Nov 2020 12:31) (90% - 100%)      ABG - ( 19 Nov 2020 04:50 )  pH, Arterial: 7.45  pH, Blood: x     /  pCO2: 34    /  pO2: 68    / HCO3: 23    / Base Excess: 0.1   /  SaO2: 95                    11-18 @ 07:01  -  11-19 @ 07:00  --------------------------------------------------------  IN: 2710.8 mL / OUT: 2520 mL / NET: 190.8 mL        Mode: AC/ CMV (Assist Control/ Continuous Mandatory Ventilation)  RR (machine): 20  TV (machine): 500  FiO2: 60  PEEP: 5  ITime: 1  MAP: 13  PIP: 25        PHYSICAL EXAM:    GENERAL: sedated and intubated  NECK: Supple, No JVD; Normal thyroid; Trachea midline  CHEST/LUNG: No rales, rhonchi, wheezing   HEART: Regular rate and rhythm; No murmurs,   ABDOMEN: Soft, Nontender, Nondistended; Bowel sounds present  EXTREMITIES: +2-3 generalized edema noted; 2+ Peripheral Pulses, No clubbing and cyanosis    LABS:  CBC Full  -  ( 19 Nov 2020 06:10 )  WBC Count : 9.71 K/uL  RBC Count : 2.49 M/uL  Hemoglobin : 8.1 g/dL  Hematocrit : 24.3 %  Platelet Count - Automated : 75 K/uL  Mean Cell Volume : 97.6 fl  Mean Cell Hemoglobin : 32.5 pg  Mean Cell Hemoglobin Concentration : 33.3 gm/dL  Auto Neutrophil # : 9.13 K/uL  Auto Lymphocyte # : 0.49 K/uL  Auto Monocyte # : 0.10 K/uL  Auto Eosinophil # : 0.00 K/uL  Auto Basophil # : 0.00 K/uL  Auto Neutrophil % : 84.0 %  Auto Lymphocyte % : 5.0 %  Auto Monocyte % : 1.0 %  Auto Eosinophil % : 0.0 %  Auto Basophil % : 0.0 %    11-19    136  |  101  |  33<H>  ----------------------------<  111<H>  3.1<L>   |  26  |  3.61<H>    Ca    7.3<L>      19 Nov 2020 06:10  Phos  1.0     11-19  Mg     2.1     11-19    TPro  5.2<L>  /  Alb  2.4<L>  /  TBili  0.9  /  DBili  0.4<H>  /  AST  35  /  ALT  27  /  AlkPhos  113  11-19    PT/INR - ( 17 Nov 2020 15:56 )   PT: 15.2 sec;   INR: 1.29 ratio         PTT - ( 18 Nov 2020 07:51 )  PTT:>200.0 sec        RADIOLOGY & ADDITIONAL STUDIES REVIEWED:  ***    [ ]GOALS OF CARE DISCUSSION WITH PATIENT/FAMILY/PROXY:    CRITICAL CARE TIME SPENT: 35 minutes INTERVAL HPI/OVERNIGHT EVENTS: Patient has a difficulty weaning off the ventilator. Also, BP has been trending low; precedex has been titrated down, and started on levophed and vasopressin.     REVIEW OF SYSTEMS:  - intubated and sedated      PRESSORS: [x] YES [] NO  WHICH: levophed and vasopressin    Antimicrobial:  piperacillin/tazobactam IVPB.. 3.375 Gram(s) IV Intermittent every 12 hours    Cardiovascular:  norepinephrine Infusion 0.1 MICROgram(s)/kG/Min IV Continuous <Continuous>  ranolazine 500 milliGRAM(s) Oral two times a day  tamsulosin 0.4 milliGRAM(s) Oral at bedtime    Pulmonary:  ALBUTerol    90 MICROgram(s) HFA Inhaler 2 Puff(s) Inhalation every 6 hours    Hematalogic:  heparin   Injectable 5000 Unit(s) SubCutaneous every 12 hours    Other:  chlorhexidine 0.12% Liquid 15 milliLiter(s) Oral Mucosa every 12 hours  chlorhexidine 2% Cloths 1 Application(s) Topical <User Schedule>  dexMEDEtomidine Infusion 0.5 MICROgram(s)/kG/Hr IV Continuous <Continuous>  finasteride 5 milliGRAM(s) Oral daily  pantoprazole  Injectable 40 milliGRAM(s) IV Push daily  polyethylene glycol 3350 17 Gram(s) Oral daily  senna 2 Tablet(s) Oral at bedtime  vasopressin Infusion 0.04 Unit(s)/Min IV Continuous <Continuous>    ALBUTerol    90 MICROgram(s) HFA Inhaler 2 Puff(s) Inhalation every 6 hours  chlorhexidine 0.12% Liquid 15 milliLiter(s) Oral Mucosa every 12 hours  chlorhexidine 2% Cloths 1 Application(s) Topical <User Schedule>  dexMEDEtomidine Infusion 0.5 MICROgram(s)/kG/Hr IV Continuous <Continuous>  finasteride 5 milliGRAM(s) Oral daily  heparin   Injectable 5000 Unit(s) SubCutaneous every 12 hours  norepinephrine Infusion 0.1 MICROgram(s)/kG/Min IV Continuous <Continuous>  pantoprazole  Injectable 40 milliGRAM(s) IV Push daily  piperacillin/tazobactam IVPB.. 3.375 Gram(s) IV Intermittent every 12 hours  polyethylene glycol 3350 17 Gram(s) Oral daily  ranolazine 500 milliGRAM(s) Oral two times a day  senna 2 Tablet(s) Oral at bedtime  tamsulosin 0.4 milliGRAM(s) Oral at bedtime  vasopressin Infusion 0.04 Unit(s)/Min IV Continuous <Continuous>    Drug Dosing Weight  Height (cm): 177.8 (12 Nov 2020 08:58)  Weight (kg): 76.5 (12 Nov 2020 08:58)  BMI (kg/m2): 24.2 (12 Nov 2020 08:58)  BSA (m2): 1.94 (12 Nov 2020 08:58)      CENTRAL LINE: [x] YES [] NO  LOCATION: [facility]    ESCOBAR: [x] YES [] NO        A-LINE:  [] YES [x] NO  LOCATION: N/A    ICU Vital Signs Last 24 Hrs  T(C): 37.9 (19 Nov 2020 05:30), Max: 37.9 (19 Nov 2020 05:30)  T(F): 100.2 (19 Nov 2020 05:30), Max: 100.2 (19 Nov 2020 05:30)  HR: 91 (19 Nov 2020 12:31) (91 - 133)  BP: 112/73 (19 Nov 2020 12:00) (64/36 - 140/60)  BP(mean): 82 (19 Nov 2020 12:00) (42 - 100)  ABP: --  ABP(mean): --  RR: 27 (19 Nov 2020 12:00) (16 - 29)  SpO2: 95% (19 Nov 2020 12:31) (90% - 100%)      ABG - ( 19 Nov 2020 04:50 )  pH, Arterial: 7.45  pH, Blood: x     /  pCO2: 34    /  pO2: 68    / HCO3: 23    / Base Excess: 0.1   /  SaO2: 95                    11-18 @ 07:01  -  11-19 @ 07:00  --------------------------------------------------------  IN: 2710.8 mL / OUT: 2520 mL / NET: 190.8 mL        Mode: AC/ CMV (Assist Control/ Continuous Mandatory Ventilation)  RR (machine): 20  TV (machine): 500  FiO2: 60  PEEP: 5  ITime: 1  MAP: 13  PIP: 25        PHYSICAL EXAM:    GENERAL: sedated and intubated  NECK: Supple, No JVD; Normal thyroid; Trachea midline  CHEST/LUNG: No rales, rhonchi, wheezing   HEART: Regular rate and rhythm; No murmurs,   ABDOMEN: Soft, Nontender, Nondistended; Bowel sounds present  EXTREMITIES: +2-3 generalized edema noted; 2+ Peripheral Pulses, No clubbing and cyanosis    LABS:  CBC Full  -  ( 19 Nov 2020 06:10 )  WBC Count : 9.71 K/uL  RBC Count : 2.49 M/uL  Hemoglobin : 8.1 g/dL  Hematocrit : 24.3 %  Platelet Count - Automated : 75 K/uL  Mean Cell Volume : 97.6 fl  Mean Cell Hemoglobin : 32.5 pg  Mean Cell Hemoglobin Concentration : 33.3 gm/dL  Auto Neutrophil # : 9.13 K/uL  Auto Lymphocyte # : 0.49 K/uL  Auto Monocyte # : 0.10 K/uL  Auto Eosinophil # : 0.00 K/uL  Auto Basophil # : 0.00 K/uL  Auto Neutrophil % : 84.0 %  Auto Lymphocyte % : 5.0 %  Auto Monocyte % : 1.0 %  Auto Eosinophil % : 0.0 %  Auto Basophil % : 0.0 %    11-19    136  |  101  |  33<H>  ----------------------------<  111<H>  3.1<L>   |  26  |  3.61<H>    Ca    7.3<L>      19 Nov 2020 06:10  Phos  1.0     11-19  Mg     2.1     11-19    TPro  5.2<L>  /  Alb  2.4<L>  /  TBili  0.9  /  DBili  0.4<H>  /  AST  35  /  ALT  27  /  AlkPhos  113  11-19    PT/INR - ( 17 Nov 2020 15:56 )   PT: 15.2 sec;   INR: 1.29 ratio         PTT - ( 18 Nov 2020 07:51 )  PTT:>200.0 sec        RADIOLOGY & ADDITIONAL STUDIES REVIEWED:  ***    [ ]GOALS OF CARE DISCUSSION WITH PATIENT/FAMILY/PROXY:    CRITICAL CARE TIME SPENT: 35 minutes

## 2020-11-19 NOTE — PROGRESS NOTE ADULT - ASSESSMENT
a 86 yo male with PMHx of CAD with stent, Afib on Eliquis, HTN, HLD, Gout who presented with weakness and shortness of breath. Pt is being admitted to ICU for hypoxic respiratory failure requiring BIPAP support.    1.Hypoxic Resp failure due to pneumonia and fluid overload  2. VASQUEZ vs CKD  3.Acute encephalopathy  4.Afib on Eliquis  5.HTN  6.BPH  7.Anemia  8. thrombocytopenia    =====CNS=====  #intubated and sedated since     #Acute Encephalopathy vs worsening dementia  - CT head on admission negative for any acute stroke  - will assess today s/p fentanyl d/c'd    =====CVS=====  #Afib, on eliquis at home  - EKG afib, no acute ischemic changes  - hold heparin drip due to decreasing platelet count    #CAD  - h/o CAD, on no meds    =====Pulm=====  #acute pulmonary edema, likely 2/2 fluid overload from worsening VASQUEZ  - echo (10/22) normal EF, mild-mod MR and mild tricuspid regurgitation  - c/w temporary HD daily    #acute hypoxic respiratory failure, likely 2/2 fluid overload & PNA  - intubated on 2020 for hypoxia     =====GI=====  #no acute issues    =====Renal=====Dr. Caputo  #VASQUEZ  - baseline Cr of around 2.7 on   - Cr 4.19 () -> 3.56  - AB.3/48/49/24  - c/w HD (d4), Nabicarb 650mg PO q8h  - strict I/O monitoring    #Anasarca  - fluid overload  - alb 2.5 (trending up)  - c/w albumin 100ml q6h x8    =====ID=====  #possible PNA  - Scx, Bcx, and Ucx neg  - COVID neg  - CXR increasing bilateral pleural effusions, greater on the left. Cannot exclude underlying infiltrate or atelectasis  - c/w zosyn x7d (d 7 of 7)    =====Heme=====  #Anemia, likely 2/2 hematuria  - resolved  - hgb 9.2 () -> 8.5  - s/p 3u pRBC  - will transfuse if <7    #Thrombocytopenia  - platelet noted to be trending down  - plt 90 ()  - hold heparin drip  - monitor platelet count  - f/u heparin antibody    =====Skin/Catheters=====  #no acute issues    =====Ppx  Feeding/fluid: nephro 40ml x 24hr  Analgesic: none  Sedation: none  Thrombolytics: heparin drip  Head elevation: yes  Ulcer, stress: PPI 40mg IVP  Glycemic control: none  Bowel regimen: senna, miralax  Indwelling catheter: yes  Drug de-escalation: fentanyl d/c'd

## 2020-11-19 NOTE — PROGRESS NOTE ADULT - PROBLEM SELECTOR PLAN 4
Per report, patient with + generalized weakness, confusion/hallucinations. Patient now with multi-organ failure, remains sedated/intubated, on pressor, continues emergent HD. Per report, patient with + generalized weakness, confusion/hallucinations. Patient now with multi-organ failure, remains sedated/intubated, on 2 pressors, continues emergent HD. Per report, patient with + generalized weakness, confusion/hallucinations. Patient now with multi-organ failure, remains sedated/intubated, on 2 pressors, continues emergent HD. Overall prognosis is poor.

## 2020-11-19 NOTE — PROGRESS NOTE ADULT - PROBLEM SELECTOR PLAN 5
See GOC section above. Patient's wife/Melanie Simpson deferring medical decision making to her son/Cristhian Rooney ([phone]). MOLST completed as per family's wishes: DNR / trial intubation/ trial feeding tube / trial IV fluids / use abx / NO TRACH OR PEG.

## 2020-11-19 NOTE — GOALS OF CARE CONVERSATION - ADVANCED CARE PLANNING - CONVERSATION DETAILS
PC NP, PC , and  contacted the patient's family to discuss goals of care.  Patient's son Cristhian Rooney stated his mother Melanie Simpson has deferred to him for medical decision making.  Mrs. Simpson confirmed this arrangement.  Dr. Samayoa provided a clinical summary including ongoing intubation, HD and two pressors to maintain blood pressure compatible with life.  Family was educated regarding the risks versus benefits of CPR, trach/peg/ongoing HD in the setting of advanced illness.  Physician stated the patient has a poor prognosis and has not tolerated weaning the vent.  Cristhian stated his father was experiencing a functional decline over the past few weeks prior to admission. Quality of life is a priority, as per the patients' family, therefore the MOLST was drafted as follows: DNR/NoTrach/No Peg.  Emotional support was provided throughout the discussion.  Mr. Rooney stated he has a sister by one parent and has the support of his family.  Patient is a native of [place], Taoist, and a retired . Mr. Rooney shared fond memories of spending time with his father at his place of employment.  PC Liborio recognized the beautiful legacy the patient has created and encouraged Mr. Rooney to visit and spend time with the patient, although he is unable to communicate.  Family denied addl. needs at this time and stated they will reach out should any needs arise. Liborio provided her contact information and will remain available as needed.

## 2020-11-19 NOTE — PROGRESS NOTE ADULT - PROBLEM SELECTOR PLAN 3
Dx per dietary. Albumin 2.5, + anasarca. High risk skin breakdown. Continues TF via NGT. Dx per dietary. Albumin 2.4, + anasarca. High risk skin breakdown. Continues TF via NGT.

## 2020-11-19 NOTE — PROGRESS NOTE ADULT - PROBLEM SELECTOR PLAN 2
2/2 multifocal PNA; comorbid MOF involving lungs, heart, kidneys. WBC 13.88. CXR indicates Increasing bilateral pleural effusions, greater on the left and chronic fibrotic changes. Patient remains sedated/intubated, on IV abx, pressor. Overall, poor prognosis. 2/2 multifocal PNA; comorbid MOF involving lungs, heart, kidneys. Patient remains sedated/intubated, on IV abx, 2 pressors. Overall, poor prognosis. 2/2 multifocal PNA; comorbid MOF involving lungs, heart, kidneys. CXR indicates Bilateral lung parenchymal interstitial and airspace opacities are redemonstrated, stable on the left and increasing on the right. Patient remains sedated/intubated, on IV abx, 2 pressors. Overall, poor prognosis. Patient now DNR / trial intubation / no trach or peg.

## 2020-11-19 NOTE — PROGRESS NOTE ADULT - SUBJECTIVE AND OBJECTIVE BOX
OVERNIGHT EVENTS: patient now on 2 pressors    Present Symptoms:   Review of Systems:   Unable to obtain due to poor mentation    MEDICATIONS  (STANDING):  ALBUTerol    90 MICROgram(s) HFA Inhaler 2 Puff(s) Inhalation every 6 hours  chlorhexidine 0.12% Liquid 15 milliLiter(s) Oral Mucosa every 12 hours  chlorhexidine 2% Cloths 1 Application(s) Topical <User Schedule>  dexMEDEtomidine Infusion 0.5 MICROgram(s)/kG/Hr (9.56 mL/Hr) IV Continuous <Continuous>  finasteride 5 milliGRAM(s) Oral daily  lactulose Syrup 10 Gram(s) Oral daily  midodrine 10 milliGRAM(s) Oral every 8 hours  norepinephrine Infusion 0.1 MICROgram(s)/kG/Min (7.17 mL/Hr) IV Continuous <Continuous>  pantoprazole  Injectable 40 milliGRAM(s) IV Push daily  phenylephrine    Infusion 1 MICROgram(s)/kG/Min (14.3 mL/Hr) IV Continuous <Continuous>  piperacillin/tazobactam IVPB.. 3.375 Gram(s) IV Intermittent every 12 hours  polyethylene glycol 3350 17 Gram(s) Oral daily  ranolazine 500 milliGRAM(s) Oral two times a day  senna 2 Tablet(s) Oral at bedtime  tamsulosin 0.4 milliGRAM(s) Oral at bedtime    MEDICATIONS  (PRN):      PHYSICAL EXAM:  Vital Signs Last 24 Hrs  T(C): 37.9 (19 Nov 2020 05:30), Max: 37.9 (19 Nov 2020 05:30)  T(F): 100.2 (19 Nov 2020 05:30), Max: 100.2 (19 Nov 2020 05:30)  HR: 127 (19 Nov 2020 08:20) (95 - 133)  BP: 101/58 (19 Nov 2020 08:15) (64/36 - 140/60)  BP(mean): 67 (19 Nov 2020 08:15) (42 - 92)  RR: 16 (19 Nov 2020 08:15) (16 - 29)  SpO2: 95% (19 Nov 2020 08:20) (91% - 100%)  General: Patient sedated/intubated, on pressors. No signs of distress.   Karnofsky Performance Score/Palliative Performance Status Version2:     10%    HEENT:   ET tube   Lungs: comfortable, on ventilator. Continues precedex  CV: S1S2, + tachycardia, on2 pressors + midodrine  GI:   incontinent, NGT  :  robbins, on HD  Musculoskeletal: sedated/intubated, dependent on ADLs  Skin: + edema  Neuro: patient sedated/intubated, dependent on ADLs  Oral intake ability: unable/only mouth care   Diet: NPO; TF via NGT      LABS:                          8.1    9.71  )-----------( 75       ( 19 Nov 2020 06:10 )             24.3     11-19    136  |  101  |  x   ----------------------------<  x   3.1<L>   |  x   |  x     Ca    7.1<L>      18 Nov 2020 03:28  Phos  2.4     11-18  Mg     2.0     11-18    TPro  5.7<L>  /  Alb  2.5<L>  /  TBili  0.9  /  DBili  x   /  AST  51<H>  /  ALT  39  /  AlkPhos  69  11-18        RADIOLOGY & ADDITIONAL STUDIES: reviewed    ADVANCE DIRECTIVES:     OVERNIGHT EVENTS: patient now on 2 pressors    Present Symptoms:   Review of Systems:   Unable to obtain due to poor mentation    MEDICATIONS  (STANDING):  ALBUTerol    90 MICROgram(s) HFA Inhaler 2 Puff(s) Inhalation every 6 hours  chlorhexidine 0.12% Liquid 15 milliLiter(s) Oral Mucosa every 12 hours  chlorhexidine 2% Cloths 1 Application(s) Topical <User Schedule>  dexMEDEtomidine Infusion 0.5 MICROgram(s)/kG/Hr (9.56 mL/Hr) IV Continuous <Continuous>  finasteride 5 milliGRAM(s) Oral daily  lactulose Syrup 10 Gram(s) Oral daily  midodrine 10 milliGRAM(s) Oral every 8 hours  norepinephrine Infusion 0.1 MICROgram(s)/kG/Min (7.17 mL/Hr) IV Continuous <Continuous>  pantoprazole  Injectable 40 milliGRAM(s) IV Push daily  phenylephrine    Infusion 1 MICROgram(s)/kG/Min (14.3 mL/Hr) IV Continuous <Continuous>  piperacillin/tazobactam IVPB.. 3.375 Gram(s) IV Intermittent every 12 hours  polyethylene glycol 3350 17 Gram(s) Oral daily  ranolazine 500 milliGRAM(s) Oral two times a day  senna 2 Tablet(s) Oral at bedtime  tamsulosin 0.4 milliGRAM(s) Oral at bedtime    MEDICATIONS  (PRN):      PHYSICAL EXAM:  Vital Signs Last 24 Hrs  T(C): 37.9 (19 Nov 2020 05:30), Max: 37.9 (19 Nov 2020 05:30)  T(F): 100.2 (19 Nov 2020 05:30), Max: 100.2 (19 Nov 2020 05:30)  HR: 127 (19 Nov 2020 08:20) (95 - 133)  BP: 101/58 (19 Nov 2020 08:15) (64/36 - 140/60)  BP(mean): 67 (19 Nov 2020 08:15) (42 - 92)  RR: 16 (19 Nov 2020 08:15) (16 - 29)  SpO2: 95% (19 Nov 2020 08:20) (91% - 100%)  General: Patient sedated/intubated, on pressors. No signs of distress.   Karnofsky Performance Score/Palliative Performance Status Version2:     10%    HEENT:   ET tube   Lungs: comfortable, on ventilator. Continues precedex  CV: S1S2, + tachycardia, on 2 pressors + midodrine  GI:   incontinent, NGT  :  robbins, on HD  Musculoskeletal: sedated/intubated, dependent on ADLs  Skin: + anasarca  Neuro: patient sedated/intubated, dependent on ADLs  Oral intake ability: unable/only mouth care   Diet: NPO; TF via NGT      LABS:                          8.1    9.71  )-----------( 75       ( 19 Nov 2020 06:10 )             24.3     11-19    136  |  101  |  x   ----------------------------<  x   3.1<L>   |  x   |  x     Ca    7.1<L>      18 Nov 2020 03:28  Phos  2.4     11-18  Mg     2.0     11-18    TPro  5.7<L>  /  Alb  2.5<L>  /  TBili  0.9  /  DBili  x   /  AST  51<H>  /  ALT  39  /  AlkPhos  69  11-18        RADIOLOGY & ADDITIONAL STUDIES: reviewed    ADVANCE DIRECTIVES:     OVERNIGHT EVENTS: patient now on 2 pressors    Present Symptoms:   Review of Systems:   Unable to obtain due to poor mentation    MEDICATIONS  (STANDING):  ALBUTerol    90 MICROgram(s) HFA Inhaler 2 Puff(s) Inhalation every 6 hours  chlorhexidine 0.12% Liquid 15 milliLiter(s) Oral Mucosa every 12 hours  chlorhexidine 2% Cloths 1 Application(s) Topical <User Schedule>  dexMEDEtomidine Infusion 0.5 MICROgram(s)/kG/Hr (9.56 mL/Hr) IV Continuous <Continuous>  finasteride 5 milliGRAM(s) Oral daily  lactulose Syrup 10 Gram(s) Oral daily  midodrine 10 milliGRAM(s) Oral every 8 hours  norepinephrine Infusion 0.1 MICROgram(s)/kG/Min (7.17 mL/Hr) IV Continuous <Continuous>  pantoprazole  Injectable 40 milliGRAM(s) IV Push daily  phenylephrine    Infusion 1 MICROgram(s)/kG/Min (14.3 mL/Hr) IV Continuous <Continuous>  piperacillin/tazobactam IVPB.. 3.375 Gram(s) IV Intermittent every 12 hours  polyethylene glycol 3350 17 Gram(s) Oral daily  ranolazine 500 milliGRAM(s) Oral two times a day  senna 2 Tablet(s) Oral at bedtime  tamsulosin 0.4 milliGRAM(s) Oral at bedtime    MEDICATIONS  (PRN):    PHYSICAL EXAM:  Vital Signs Last 24 Hrs  T(C): 37.9 (19 Nov 2020 05:30), Max: 37.9 (19 Nov 2020 05:30)  T(F): 100.2 (19 Nov 2020 05:30), Max: 100.2 (19 Nov 2020 05:30)  HR: 127 (19 Nov 2020 08:20) (95 - 133)  BP: 101/58 (19 Nov 2020 08:15) (64/36 - 140/60)  BP(mean): 67 (19 Nov 2020 08:15) (42 - 92)  RR: 16 (19 Nov 2020 08:15) (16 - 29)  SpO2: 95% (19 Nov 2020 08:20) (91% - 100%)  General: Patient sedated/intubated, on pressors. No signs of distress.   Karnofsky Performance Score/Palliative Performance Status Version2:     10%    HEENT:   ET tube   Lungs: comfortable, on ventilator. Continues precedex  CV: S1S2, + tachycardia, on 2 pressors + midodrine  GI:   incontinent, NGT  :  robbins, on HD  Musculoskeletal: sedated/intubated, dependent on ADLs  Skin: + anasarca  Neuro: patient sedated/intubated, dependent on ADLs  Oral intake ability: unable/only mouth care   Diet: NPO; TF via NGT      LABS:                          8.1    9.71  )-----------( 75       ( 19 Nov 2020 06:10 )             24.3     11-19    136  |  101  |  x   ----------------------------<  x   3.1<L>   |  x   |  x     Ca    7.1<L>      18 Nov 2020 03:28  Phos  2.4     11-18  Mg     2.0     11-18    TPro  5.7<L>  /  Alb  2.5<L>  /  TBili  0.9  /  DBili  x   /  AST  51<H>  /  ALT  39  /  AlkPhos  69  11-18      RADIOLOGY & ADDITIONAL STUDIES:  chest< from: Xray Chest 1 View- PORTABLE-Routine (Xray Chest 1 View- PORTABLE-Routine in AM.) (11.19.20 @ 08:53) >  EXAM:  XR CHEST PORTABLE ROUTINE 1V                        PROCEDURE DATE:  11/19/2020    INTERPRETATION:  INDICATION: Ventilatory support.  PRIORS: 11/17/2020.  VIEWS: Portable AP radiography of the chest performed.  FINDINGS: LeftIJ CVC, NGT and ETT are without significant change. Bilateral lung parenchymal interstitial and airspace opacities are redemonstrated, stable on the left and increasing on the right. Small pleural effusions cannot be excluded. There is no evidence for pneumothorax. No mediastinal shift noted. Heart size cannot be quantitated. Thoracic aortic calcification noted.    IMPRESSION: Bilateral lung parenchymal interstitial and airspace opacities are redemonstrated, stable on the left and increasing on the right.  < end of copied text >      ADVANCE DIRECTIVES: MOLST: DNR / trial intubation/ trial feeding tube / trial IV fluids / use abx / NO TRACH OR PEG.      OVERNIGHT EVENTS: patient now on 2 pressors    Present Symptoms:   Review of Systems:   Unable to obtain due to poor mentation    MEDICATIONS  (STANDING):  ALBUTerol    90 MICROgram(s) HFA Inhaler 2 Puff(s) Inhalation every 6 hours  chlorhexidine 0.12% Liquid 15 milliLiter(s) Oral Mucosa every 12 hours  chlorhexidine 2% Cloths 1 Application(s) Topical <User Schedule>  dexMEDEtomidine Infusion 0.5 MICROgram(s)/kG/Hr (9.56 mL/Hr) IV Continuous <Continuous>  finasteride 5 milliGRAM(s) Oral daily  lactulose Syrup 10 Gram(s) Oral daily  midodrine 10 milliGRAM(s) Oral every 8 hours  norepinephrine Infusion 0.1 MICROgram(s)/kG/Min (7.17 mL/Hr) IV Continuous <Continuous>  pantoprazole  Injectable 40 milliGRAM(s) IV Push daily  phenylephrine    Infusion 1 MICROgram(s)/kG/Min (14.3 mL/Hr) IV Continuous <Continuous>  piperacillin/tazobactam IVPB.. 3.375 Gram(s) IV Intermittent every 12 hours  polyethylene glycol 3350 17 Gram(s) Oral daily  ranolazine 500 milliGRAM(s) Oral two times a day  senna 2 Tablet(s) Oral at bedtime  tamsulosin 0.4 milliGRAM(s) Oral at bedtime    MEDICATIONS  (PRN):    PHYSICAL EXAM:  Vital Signs Last 24 Hrs  T(C): 37.9 (19 Nov 2020 05:30), Max: 37.9 (19 Nov 2020 05:30)  T(F): 100.2 (19 Nov 2020 05:30), Max: 100.2 (19 Nov 2020 05:30)  HR: 127 (19 Nov 2020 08:20) (95 - 133)  BP: 101/58 (19 Nov 2020 08:15) (64/36 - 140/60)  BP(mean): 67 (19 Nov 2020 08:15) (42 - 92)  RR: 16 (19 Nov 2020 08:15) (16 - 29)  SpO2: 95% (19 Nov 2020 08:20) (91% - 100%)  General: Patient sedated/intubated, on pressors. No signs of distress.   Karnofsky Performance Score/Palliative Performance Status Version2:     10%    HEENT:   ET tube   Lungs: comfortable, on ventilator. Continues precedex  CV: S1S2, + tachycardia, on 2 pressors + midodrine  GI:   incontinent, NGT  :  robbins, on HD  Musculoskeletal: sedated/intubated, dependent on ADLs  Skin: + anasarca  Neuro: patient sedated/intubated, dependent on ADLs  Oral intake ability: unable/only mouth care   Diet: NPO; TF via NGT      LABS:                          8.1    9.71  )-----------( 75       ( 19 Nov 2020 06:10 )             24.3     11-19    136  |  101  |  x   ----------------------------<  x   3.1<L>   |  x   |  x     Ca    7.1<L>      18 Nov 2020 03:28  Phos  2.4     11-18  Mg     2.0     11-18    TPro  5.7<L>  /  Alb  2.5<L>  /  TBili  0.9  /  DBili  x   /  AST  51<H>  /  ALT  39  /  AlkPhos  69  11-18      RADIOLOGY & ADDITIONAL STUDIES:  chest< from: Xray Chest 1 View- PORTABLE-Routine (Xray Chest 1 View- PORTABLE-Routine in AM.) (11.19.20 @ 08:53) >  EXAM:  XR CHEST PORTABLE ROUTINE 1V                        PROCEDURE DATE:  11/19/2020    INTERPRETATION:  INDICATION: Ventilatory support.  PRIORS: 11/17/2020.  VIEWS: Portable AP radiography of the chest performed.  FINDINGS: LeftIJ CVC, NGT and ETT are without significant change. Bilateral lung parenchymal interstitial and airspace opacities are redemonstrated, stable on the left and increasing on the right. Small pleural effusions cannot be excluded. There is no evidence for pneumothorax. No mediastinal shift noted. Heart size cannot be quantitated. Thoracic aortic calcification noted.    IMPRESSION: Bilateral lung parenchymal interstitial and airspace opacities are redemonstrated, stable on the left and increasing on the right.  < end of copied text >    < from: Xray Chest 1 View- PORTABLE-Routine (Xray Chest 1 View- PORTABLE-Routine in AM.) (11.19.20 @ 08:53) >    EXAM:  XR CHEST PORTABLE ROUTINE 1V                            PROCEDURE DATE:  11/19/2020          INTERPRETATION:  INDICATION: Ventilatory support.    PRIORS: 11/17/2020.    VIEWS: Portable AP radiography of the chest performed.    FINDINGS: LeftIJ CVC, NGT and ETT are without significant change. Bilateral lung parenchymal interstitial and airspace opacities are redemonstrated, stable on the left and increasing on the right. Small pleural effusions cannot be excluded. There is no evidence for pneumothorax. No mediastinal shift noted. Heart size cannot be quantitated. Thoracic aortic calcification noted.    IMPRESSION: Bilateral lung parenchymal interstitial and airspace opacities are redemonstrated, stable on the left and increasing on the right.      < end of copied text >      ADVANCE DIRECTIVES: MOLST: DNR / trial intubation/ trial feeding tube / trial IV fluids / use abx / NO TRACH OR PEG.

## 2020-11-19 NOTE — PHARMACOTHERAPY INTERVENTION NOTE - PROVIDER CONTACTED
[facility] Rheumatology  [address] [place] [address]  Phone: [phone]  Fax: [phone]                  Maricarmen Becerril   2017 8:00 AM   Office Visit    Description:  Female : 1953   Provider:  Lisa Knapp PA-C   Department:  [facility] - Rheumatology           Reason for Visit     Psoriatic Arthritis           Diagnoses this Visit        Comments    Psoriasis    -  Primary     Psoriatic arthritis         Medication monitoring encounter         Immunocompromised         Restrictive lung disease                To Do List           Future Appointments        Provider Department Dept Phone    4/3/2017 8:20 AM Ryan Viramontes MD [facility] Internal Medicine [phone]    2017 8:00 AM Lisa Knapp PA-C [facility] Rheumatology [phone]    2017 10:40 AM PULMONARY LAB, [facility] [phone]    2017 11:20 AM Edgar Sinclair MD [facility] [phone]    10/25/2017 8:30 AM Edgar Gu MD [facility] Rheumatology [phone]      Goals (5 Years of Data)     None      Follow-Up and Disposition     Return in about 4 months (around 2017) for me, reg 4 and dr. gu in 8 mos reg 4 .      [facility] On Call     [facility] On Call Nurse Care Line -  Assistance  Registered nurses in the [facility] On Call Center provide clinical advisement, health education, appointment booking, and other advisory services.  Call for this free service at [phone].             Medications           Message regarding Medications     Verify the changes and/or additions to your medication regime listed below are the same as discussed with your clinician today.  If any of these changes or additions are incorrect, please notify your healthcare provider.             Verify that the below list of medications is an accurate representation of the medications you are currently taking.  If none reported, the list may be blank. If incorrect, please  contact your healthcare provider. Carry this list with you in case of emergency.           Current Medications     celecoxib (CELEBREX) 200 MG capsule Take 1 capsule (200 mg total) by mouth once daily.    fish oil-omega-3 fatty acids 300-1,000 mg capsule Take 1 g by mouth once daily.    gabapentin (NEURONTIN) 100 MG capsule Take 1 capsule (100 mg total) by mouth every evening. For nerve pain /numbness in legs    HUMIRA PEN PnKt injection INJECT 40MG SUBCUTANEOUSLY EVERY OTHER WEEK    levothyroxine (SYNTHROID) 75 MCG tablet TAKE 1 TABLET BY MOUTH EVERY MORNING    fluticasone (FLONASE) 50 mcg/actuation nasal spray 2 sprays by Each Nare route once daily.    leflunomide (ARAVA) 20 MG Tab Take 1 tablet (20 mg total) by mouth every other day.           Clinical Reference Information           Your Vitals Were     BP                   137/88           Blood Pressure          Most Recent Value    BP  137/88      Allergies as of 2/28/2017     Codeine    Methotrexate    Tramadol      Immunizations Administered on Date of Encounter - 2/28/2017     None      Orders Placed During Today's Visit     Recurring Lab Work Interval Expires    C-reactive protein   4/29/2018    CBC auto differential   4/29/2018    Comprehensive metabolic panel   4/29/2018    Sedimentation rate, manual   4/29/2018      Instructions    No change in medications    Labs at [facility] on [address]       Language Assistance Services     ATTENTION: Language assistance services are available, free of charge. Please call [phone].      ATENCIÓN: Si habla gonzalez, tiene a zendejas disposición servicios gratuitos de asistencia lingüística. Llame al [phone].     [facility] Ý: N?u b?n nói Ti?ng Vi?t, có các d?ch v? h? tr? ngôn ng? mi?n phí dành cho b?n. G?i s? [phone].         [facility] complies with applicable Federal civil rights laws and does not discriminate on the basis of race, color, national origin, age, disability, or sex.         Bebo Samay[place]

## 2020-11-19 NOTE — PROGRESS NOTE ADULT - SUBJECTIVE AND OBJECTIVE BOX
INTERVAL HPI/OVERNIGHT EVENTS: Remains intubated. Patient BP is trending down. Midodrine and phenlephrine started yesterday, but his BP remains low. Levo started this am. His BP seems to be improving.    REVIEW OF SYSTEMS:  - sedated and intubated      PRESSORS: [x] YES [] NO  WHICH: phenylephrine and levophed    Antimicrobial:  piperacillin/tazobactam IVPB.. 3.375 Gram(s) IV Intermittent every 12 hours    Cardiovascular:  midodrine 10 milliGRAM(s) Oral every 8 hours  norepinephrine Infusion 0.1 MICROgram(s)/kG/Min IV Continuous <Continuous>  phenylephrine    Infusion 1 MICROgram(s)/kG/Min IV Continuous <Continuous>  ranolazine 500 milliGRAM(s) Oral two times a day  tamsulosin 0.4 milliGRAM(s) Oral at bedtime    Pulmonary:  ALBUTerol    90 MICROgram(s) HFA Inhaler 2 Puff(s) Inhalation every 6 hours    Hematalogic:    Other:  chlorhexidine 0.12% Liquid 15 milliLiter(s) Oral Mucosa every 12 hours  chlorhexidine 2% Cloths 1 Application(s) Topical <User Schedule>  dexMEDEtomidine Infusion 0.5 MICROgram(s)/kG/Hr IV Continuous <Continuous>  finasteride 5 milliGRAM(s) Oral daily  lactulose Syrup 10 Gram(s) Oral daily  pantoprazole  Injectable 40 milliGRAM(s) IV Push daily  polyethylene glycol 3350 17 Gram(s) Oral daily  senna 2 Tablet(s) Oral at bedtime    ALBUTerol    90 MICROgram(s) HFA Inhaler 2 Puff(s) Inhalation every 6 hours  chlorhexidine 0.12% Liquid 15 milliLiter(s) Oral Mucosa every 12 hours  chlorhexidine 2% Cloths 1 Application(s) Topical <User Schedule>  dexMEDEtomidine Infusion 0.5 MICROgram(s)/kG/Hr IV Continuous <Continuous>  finasteride 5 milliGRAM(s) Oral daily  lactulose Syrup 10 Gram(s) Oral daily  midodrine 10 milliGRAM(s) Oral every 8 hours  norepinephrine Infusion 0.1 MICROgram(s)/kG/Min IV Continuous <Continuous>  pantoprazole  Injectable 40 milliGRAM(s) IV Push daily  phenylephrine    Infusion 1 MICROgram(s)/kG/Min IV Continuous <Continuous>  piperacillin/tazobactam IVPB.. 3.375 Gram(s) IV Intermittent every 12 hours  polyethylene glycol 3350 17 Gram(s) Oral daily  ranolazine 500 milliGRAM(s) Oral two times a day  senna 2 Tablet(s) Oral at bedtime  tamsulosin 0.4 milliGRAM(s) Oral at bedtime    Drug Dosing Weight  Height (cm): 177.8 (12 Nov 2020 08:58)  Weight (kg): 76.5 (12 Nov 2020 08:58)  BMI (kg/m2): 24.2 (12 Nov 2020 08:58)  BSA (m2): 1.94 (12 Nov 2020 08:58)    CENTRAL LINE: [x] YES [] NO  LOCATION: [place]: [x] YES [] NO        A-LINE:  [] YES [x] NO  LOCATION: N/A    ICU Vital Signs Last 24 Hrs  T(C): 37.9 (19 Nov 2020 05:30), Max: 37.9 (19 Nov 2020 05:30)  T(F): 100.2 (19 Nov 2020 05:30), Max: 100.2 (19 Nov 2020 05:30)  HR: 125 (19 Nov 2020 08:15) (95 - 133)  BP: 101/58 (19 Nov 2020 08:15) (64/36 - 140/60)  BP(mean): 67 (19 Nov 2020 08:15) (42 - 92)  ABP: --  ABP(mean): --  RR: 16 (19 Nov 2020 08:15) (16 - 29)  SpO2: 97% (19 Nov 2020 08:15) (91% - 100%)      ABG - ( 19 Nov 2020 04:50 )  pH, Arterial: 7.45  pH, Blood: x     /  pCO2: 34    /  pO2: 68    / HCO3: 23    / Base Excess: 0.1   /  SaO2: 95                    11-18 @ 07:01  -  11-19 @ 07:00  --------------------------------------------------------  IN: 2657.4 mL / OUT: 2520 mL / NET: 137.4 mL        Mode: AC/ CMV (Assist Control/ Continuous Mandatory Ventilation)  RR (machine): 20  TV (machine): 500  FiO2: 50  PEEP: 5  ITime: 1  MAP: 13  PIP: 26        PHYSICAL EXAM:    GENERAL: sedated and intubated  NECK: Supple, No JVD; Normal thyroid; Trachea midline  CHEST/LUNG: No rales, rhonchi, wheezing   HEART: Regular rate and rhythm; No murmurs,   ABDOMEN: Soft, Nontender, Nondistended; Bowel sounds present  EXTREMITIES: +2/3 pitting edema in UE & LE b/l; 2+ Peripheral Pulses, No clubbing and cyanosis    LABS:  CBC Full  -  ( 19 Nov 2020 06:10 )  WBC Count : 9.71 K/uL  RBC Count : 2.49 M/uL  Hemoglobin : 8.1 g/dL  Hematocrit : 24.3 %  Platelet Count - Automated : 75 K/uL  Mean Cell Volume : 97.6 fl  Mean Cell Hemoglobin : 32.5 pg  Mean Cell Hemoglobin Concentration : 33.3 gm/dL  Auto Neutrophil # : 9.13 K/uL  Auto Lymphocyte # : 0.49 K/uL  Auto Monocyte # : 0.10 K/uL  Auto Eosinophil # : 0.00 K/uL  Auto Basophil # : 0.00 K/uL  Auto Neutrophil % : 84.0 %  Auto Lymphocyte % : 5.0 %  Auto Monocyte % : 1.0 %  Auto Eosinophil % : 0.0 %  Auto Basophil % : 0.0 %    11-19    136  |  101  |  x   ----------------------------<  x   3.1<L>   |  x   |  x     Ca    7.1<L>      18 Nov 2020 03:28  Phos  2.4     11-18  Mg     2.0     11-18    TPro  5.7<L>  /  Alb  2.5<L>  /  TBili  0.9  /  DBili  x   /  AST  51<H>  /  ALT  39  /  AlkPhos  69  11-18    PT/INR - ( 17 Nov 2020 15:56 )   PT: 15.2 sec;   INR: 1.29 ratio         PTT - ( 18 Nov 2020 07:51 )  PTT:>200.0 sec        RADIOLOGY & ADDITIONAL STUDIES REVIEWED:  ***    [ ]GOALS OF CARE DISCUSSION WITH PATIENT/FAMILY/PROXY:    CRITICAL CARE TIME SPENT: 35 minutes

## 2020-11-19 NOTE — PHARMACOTHERAPY INTERVENTION NOTE - COMMENTS
During Critical Care rounds, recommended administering Vancomycin 1gm IV after the patient's dialysis session.

## 2020-11-20 NOTE — PROGRESS NOTE ADULT - ASSESSMENT
Pt is a 84 yo male with PMHx of CAD with stent, Afib on Eliquis, HTN, HLD, Gout, who presented to ED for generalized weakness for the past 2 weeks, generalized deteriorating progressively, and worsening cough. Son also reports that the patient's mental status has been slowly deteriorating. Admitted with fluid overload.    A/P:  Renal failure:  Acute on CKD    Etiology? likely ATN  d/w his outpatient nephrologist Dr. 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  Urine protein/cr ratio 2.4gm  Follow up vasculitis work up. C3 is low  Has negative JAI, C4, Hep C, HBsAg,  serum free light chain, RPR,  ANCA, anti-GBM, SPEP, Serum immunofixation    Hyponatremia:  Likely sec to fluid overload  Ruled out SIADH  Monitor closley    Acidosis:  Both rep and metabolic-Non-AG  Improved  Vent management per ICU    Hypophosphatemia:  supplement K-Phos 15mmol IV one dose  MOnitor serum PO4    Anemia:  Etiology?  Iron study suggest iron deficiency  Can't give IV iron in view of infection  Get B12, folate  Transfuse PRN to keep Hb >8.0  Monitor Hb

## 2020-11-20 NOTE — PROGRESS NOTE ADULT - SUBJECTIVE AND OBJECTIVE BOX
[facility] [place] [facility]   MD MAN CORRAL MD RUORU WONG, PA    TEL:  OFFICE: [phone]  DR SMITH CELL: [phone]  OSVALDO LOPEZ CELL: [phone]  DR. PEDERSON CELL: [phone]    RENAL FOLLOW UP NOTE-- Date of Service ;; 11-20-20 @ 07:52  --------------------------------------------------------------------------------  HPI:  Pt seen and examined at bedside in ICU    PT intubated on pressor support    PAST HISTORY  --------------------------------------------------------------------------------  No significant changes to PMH, PSH, FHx, SHx, unless otherwise noted    ALLERGIES & MEDICATIONS  --------------------------------------------------------------------------------  Allergies    No Known Allergies    Intolerances      Standing Inpatient Medications  ALBUTerol    90 MICROgram(s) HFA Inhaler 2 Puff(s) Inhalation every 6 hours  chlorhexidine 0.12% Liquid 15 milliLiter(s) Oral Mucosa every 12 hours  chlorhexidine 2% Cloths 1 Application(s) Topical <User Schedule>  dexMEDEtomidine Infusion 0.5 MICROgram(s)/kG/Hr IV Continuous <Continuous>  fentaNYL    Injectable 25 MICROGram(s) IV Push once  fentaNYL   Infusion 0.5 MICROgram(s)/kG/Hr IV Continuous <Continuous>  finasteride 5 milliGRAM(s) Oral daily  heparin   Injectable 5000 Unit(s) SubCutaneous every 12 hours  norepinephrine Infusion 0.1 MICROgram(s)/kG/Min IV Continuous <Continuous>  pantoprazole  Injectable 40 milliGRAM(s) IV Push daily  tamsulosin 0.4 milliGRAM(s) Oral at bedtime  vasopressin Infusion 0.04 Unit(s)/Min IV Continuous <Continuous>    PRN Inpatient Medications      REVIEW OF SYSTEMS  --------------------------------------------------------------------------------  unable to obtain    VITALS/PHYSICAL EXAM  --------------------------------------------------------------------------------  T(C): 38.1 (11-20-20 @ 05:41), Max: 38.1 (11-20-20 @ 05:41)  HR: 114 (11-20-20 @ 07:45) (83 - 127)  BP: 166/101 (11-20-20 @ 07:45) (86/49 - 169/108)  RR: 29 (11-20-20 @ 07:45) (16 - 32)  SpO2: 91% (11-20-20 @ 07:45) (88% - 100%)  Wt(kg): --        11-19-20 @ 07:01  -  11-20-20 @ 07:00  --------------------------------------------------------  IN: 1817.6 mL / OUT: 345 mL / NET: 1472.6 mL      Physical Exam:  	Gen: intubated  	HEENT: + ETT  	Pulm: Coarse breath sounds B/L  	CV: S1S2  	Abd: Soft, +BS  	Ext: No LE edema B/L                      Neuro: sedated  	Skin: Warm and Dry   	Vascular access: keaton robbins  LABS/STUDIES  --------------------------------------------------------------------------------              7.8    14.27 >-----------<  x        [11-20-20 @ 06:28]              23.6     134  |  100  |  50  ----------------------------<  208      [11-20-20 @ 06:28]  3.6   |  24  |  4.24        Ca     7.2     [11-20-20 @ 06:28]      Mg     2.2     [11-20-20 @ 06:28]      Phos  1.4     [11-20-20 @ 06:28]    TPro  5.4  /  Alb  2.5  /  TBili  0.9  /  DBili  0.4  /  AST  32  /  ALT  25  /  AlkPhos  134  [11-20-20 @ 06:28]        Troponin 0.530      [11-19-20 @ 19:00]        [11-19-20 @ 06:10]    Creatinine Trend:  SCr 4.24 [11-20 @ 06:28]  SCr 3.61 [11-19 @ 06:10]  SCr 3.56 [11-18 @ 03:28]  SCr 4.19 [11-17 @ 07:28]  SCr 4.04 [11-17 @ 04:21]    Urinalysis - [11-12-20 @ 18:04]      Color Yellow / Appearance Clear / SG 1.025 / pH 5.0      Gluc Negative / Ketone Negative  / Bili Negative / Urobili Negative       Blood Moderate / Protein 100 / Leuk Est Trace / Nitrite Negative      RBC 5-10 / WBC 3-5 / Hyaline 0-2 / Gran 5-10 / Sq Epi  / Non Sq Epi Few / Bacteria Few    Urine Creatinine 88      [11-13-20 @ 13:49]  Urine Protein 218      [11-13-20 @ 13:49]  Urine Sodium 41      [11-13-20 @ 13:49]  Urine Phosphorus See Note      [11-15-20 @ 07:08]  Urine Osmolality 284      [11-13-20 @ 13:49]    Iron 24, TIBC 142, %sat 17      [11-13-20 @ 13:38]  TSH 2.01      [11-13-20 @ 06:39]  Lipid: chol 172, TG 83, HDL 46, LDL --      [11-13-20 @ 06:39]    HBsAg Nonreact      [11-14-20 @ 10:25]  HCV 0.15, Nonreact      [11-14-20 @ 10:25]    JAI: titer Negative, pattern --      [11-13-20 @ 20:35]  C3 Complement 52      [11-13-20 @ 22:30]  C4 Complement 20      [11-13-20 @ 22:30]  ANCA: cANCA Negative, pANCA Negative, atypical ANCA Indeterminate      [11-13-20 @ 20:35]  anti-GBM <1.0      [11-13-20 @ 21:37]  Syphilis Screen (Treponema Pallidum Ab) Negative      [11-13-20 @ 20:35]  Free Light Chains: kappa 14.62, lambda 10.97, ratio = 1.33      [11-13 @ 22:30]  Immunofixation Serum:   No Monoclonal Band Identified    Reference Range: None Detected      [11-13-20 @ 22:30]  SPEP Interpretation: Hypoalbuminemia      [11-13-20 @ 22:30]

## 2020-11-20 NOTE — PROGRESS NOTE ADULT - SUBJECTIVE AND OBJECTIVE BOX
INTERVAL HPI/OVERNIGHT EVENTS: Patient has a difficulty weaning off the ventilator. Fentanyl started this am. Spoke to his son, Cristhian, this am. Cristhian wishes to start palliative care. Plan for extubation tomorrow.    REVIEW OF SYSTEMS:  - unable to assess due to sedation and intubation      PRESSORS: [x] YES [] NO  WHICH: levo and vasopressin     Antimicrobial:    Cardiovascular:  norepinephrine Infusion 0.1 MICROgram(s)/kG/Min IV Continuous <Continuous>  tamsulosin 0.4 milliGRAM(s) Oral at bedtime    Pulmonary:  ALBUTerol    90 MICROgram(s) HFA Inhaler 2 Puff(s) Inhalation every 6 hours    Hematalogic:  heparin   Injectable 5000 Unit(s) SubCutaneous every 8 hours    Other:  chlorhexidine 0.12% Liquid 15 milliLiter(s) Oral Mucosa every 12 hours  chlorhexidine 2% Cloths 1 Application(s) Topical <User Schedule>  dexMEDEtomidine Infusion 0.5 MICROgram(s)/kG/Hr IV Continuous <Continuous>  fentaNYL   Infusion 2 MICROgram(s)/kG/Hr IV Continuous <Continuous>  finasteride 5 milliGRAM(s) Oral daily  pantoprazole  Injectable 40 milliGRAM(s) IV Push daily  vasopressin Infusion 0.04 Unit(s)/Min IV Continuous <Continuous>    ALBUTerol    90 MICROgram(s) HFA Inhaler 2 Puff(s) Inhalation every 6 hours  chlorhexidine 0.12% Liquid 15 milliLiter(s) Oral Mucosa every 12 hours  chlorhexidine 2% Cloths 1 Application(s) Topical <User Schedule>  dexMEDEtomidine Infusion 0.5 MICROgram(s)/kG/Hr IV Continuous <Continuous>  fentaNYL   Infusion 2 MICROgram(s)/kG/Hr IV Continuous <Continuous>  finasteride 5 milliGRAM(s) Oral daily  heparin   Injectable 5000 Unit(s) SubCutaneous every 8 hours  norepinephrine Infusion 0.1 MICROgram(s)/kG/Min IV Continuous <Continuous>  pantoprazole  Injectable 40 milliGRAM(s) IV Push daily  tamsulosin 0.4 milliGRAM(s) Oral at bedtime  vasopressin Infusion 0.04 Unit(s)/Min IV Continuous <Continuous>    Drug Dosing Weight  Height (cm): 177.8 (12 Nov 2020 08:58)  Weight (kg): 76.5 (12 Nov 2020 08:58)  BMI (kg/m2): 24.2 (12 Nov 2020 08:58)  BSA (m2): 1.94 (12 Nov 2020 08:58)      CENTRAL LINE: [] YES [x] NO  LOCATION: N/A    ESCOBAR: [x] YES [] NO        A-LINE:  [] YES [x] NO  LOCATION: N/A    ICU Vital Signs Last 24 Hrs  T(C): 38.1 (20 Nov 2020 05:41), Max: 38.1 (20 Nov 2020 05:41)  T(F): 100.6 (20 Nov 2020 05:41), Max: 100.6 (20 Nov 2020 05:41)  HR: 93 (20 Nov 2020 12:15) (83 - 127)  BP: 108/68 (20 Nov 2020 12:15) (86/49 - 169/108)  BP(mean): 78 (20 Nov 2020 12:15) (57 - 120)  ABP: --  ABP(mean): --  RR: 15 (20 Nov 2020 12:15) (15 - 32)  SpO2: 96% (20 Nov 2020 12:15) (85% - 100%)      ABG - ( 20 Nov 2020 08:44 )  pH, Arterial: 7.39  pH, Blood: x     /  pCO2: 38    /  pO2: 60    / HCO3: 23    / Base Excess: -1.5  /  SaO2: 91                    11-19 @ 07:01  -  11-20 @ 07:00  --------------------------------------------------------  IN: 1817.6 mL / OUT: 345 mL / NET: 1472.6 mL        Mode: AC/ CMV (Assist Control/ Continuous Mandatory Ventilation)  RR (machine): 16  TV (machine): 480  FiO2: 80  PEEP: 5  ITime: 1  MAP: 13  PIP: 33        PHYSICAL EXAM:    GENERAL: sedated and intubated  CHEST/LUNG: No rales, rhonchi, wheezing   HEART: Regular rate and rhythm; No murmurs,   ABDOMEN: Soft, Nontender, Nondistended; Bowel sounds present  EXTREMITIES: +2/3 generalized pitting edema in all extremities;  2+ Peripheral Pulses, No clubbing or edema    LABS:  CBC Full  -  ( 20 Nov 2020 06:28 )  WBC Count : 14.27 K/uL  RBC Count : 2.42 M/uL  Hemoglobin : 7.8 g/dL  Hematocrit : 23.6 %  Platelet Count - Automated : 80 K/uL  Mean Cell Volume : 97.5 fl  Mean Cell Hemoglobin : 32.2 pg  Mean Cell Hemoglobin Concentration : 33.1 gm/dL  Auto Neutrophil # : SEE PREV.DIFF K/uL  Auto Lymphocyte # : SEE PREV DIFF K/uL  Auto Monocyte # : SEE PREV DIFF K/uL  Auto Eosinophil # : SEE PREV DIFF K/uL  Auto Basophil # : SEE PREV DIFF K/uL  Auto Neutrophil % : SEE PREV DIFF Manual diff was performed w-in 72 hrs.  Differential percentages must be correlated with absolute numbers for  clinical significance. %  Auto Lymphocyte % : SEE PREV DIFF %  Auto Monocyte % : SEE PREV DIFF %  Auto Eosinophil % : SEE PREV DIFF %  Auto Basophil % : SEE PREV.DIFF %    11-20    134<L>  |  100  |  50<H>  ----------------------------<  208<H>  3.6   |  24  |  4.24<H>    Ca    7.2<L>      20 Nov 2020 06:28  Phos  1.4     11-20  Mg     2.2     11-20    TPro  5.4<L>  /  Alb  2.5<L>  /  TBili  0.9  /  DBili  0.4<H>  /  AST  32  /  ALT  25  /  AlkPhos  134<H>  11-20            RADIOLOGY & ADDITIONAL STUDIES REVIEWED:  yes    [x]GOALS OF CARE DISCUSSION WITH PATIENT/FAMILY/PROXY: palliative care, DNR    CRITICAL CARE TIME SPENT: 35 minutes

## 2020-11-20 NOTE — PROGRESS NOTE ADULT - ASSESSMENT
a 84 yo male with PMHx of CAD with stent, Afib on Eliquis, HTN, HLD, Gout who presented with weakness and shortness of breath. Pt is being admitted to ICU for hypoxic respiratory failure requiring BIPAP support.    0. Palliative now  1.Hypoxic Resp failure due to pneumonia and fluid overload  2. VASQUEZ vs CKD  3.Acute encephalopathy  4.Afib on Eliquis  5.HTN  6.BPH  7.Anemia  8. thrombocytopenia    =====CNS=====  #intubated and sedated since 11/13  - plan for extubation tomorrow 11/21    #Acute Encephalopathy vs worsening dementia  - palliative care only    =====CVS=====  #Afib, on eliquis at home  - EKG afib, no acute ischemic changes  - hold heparin drip due to decreasing platelet count    #CAD  - h/o CAD, on no meds    =====Pulm=====  #acute pulmonary edema, likely 2/2 fluid overload from worsening VASQUEZ  - echo (10/22) normal EF, mild-mod MR and mild tricuspid regurgitation  - palliative care only; will extubate tomorrow     #acute hypoxic respiratory failure, likely 2/2 fluid overload & PNA  - intubated on 11/13/2020 for hypoxia   - palliative care only; will extubate tomorrow     =====GI=====  #no acute issues    =====Renal=====Dr. Caputo  #VASQUEZ  - baseline Cr of around 2.7 on 05/20  - Cr 4.19 (11/17) -> 3.56  - s/p Nabicarb 650mg PO q8h  - palliative care only    #Anasarca  - fluid overload  - alb 2.5 (trending up)  - s/p albumin 100ml q6h x8    =====ID=====  #possible PNA  - Scx, Bcx, and Ucx neg  - COVID neg  - CXR increasing bilateral pleural effusions, greater on the left. Cannot exclude underlying infiltrate or atelectasis  - palliative care only    #possible sepsis  - RR 30 on vent, temp 37.5C, HR wnl, band >10%  - BP in 90's  - possible sources: shiley catheter, ETT  - s/p vancomycin x1 and zyosin  - c/w vasopressin and levophed  - no fluid as he is fluid overloaded    =====Heme=====  #Anemia, likely 2/2 hematuria  - resolved  - hgb 9.2 (11/17) -> 8.5 ->8.1  - s/p 3u pRBC  - palliative care only    #Thrombocytopenia  - platelet noted to be trending down  - plt 90 (11/18)  - heparin ab neg  - palliative care only    =====Skin/Catheters=====  #no acute issues    =====Ppx  Feeding/fluid: nephro 40ml x 24hr  Analgesic: none  Sedation: none  Thrombolytics: heparin IVP q12  Head elevation: yes  Ulcer, stress: PPI 40mg IVP  Glycemic control: none  Bowel regimen: senna, miralax  Indwelling catheter: yes  Drug de-escalation: precedex titrated down

## 2020-11-21 NOTE — PROGRESS NOTE ADULT - ASSESSMENT
a 86 yo male with PMHx of CAD with stent, Afib on Eliquis, HTN, HLD, Gout who presented with weakness and shortness of breath. Pt is being admitted to ICU for hypoxic respiratory failure requiring BIPAP support.    0. Palliative now  1.Hypoxic Resp failure due to pneumonia and fluid overload  2. VASQUEZ vs CKD  3.Acute encephalopathy  4.Afib on Eliquis  5.HTN  6.BPH  7.Anemia  8. thrombocytopenia    =====CNS=====  #intubated and sedated since 11/13  - plan for extubation tomorrow 11/21    #Acute Encephalopathy vs worsening dementia  - palliative care only    =====CVS=====  #Afib, on eliquis at home  - EKG afib, no acute ischemic changes  - hold heparin drip due to decreasing platelet count    #CAD  - h/o CAD, on no meds    =====Pulm=====  #acute pulmonary edema, likely 2/2 fluid overload from worsening VASQUEZ  - echo (10/22) normal EF, mild-mod MR and mild tricuspid regurgitation  - palliative care only; will extubate tomorrow     #acute hypoxic respiratory failure, likely 2/2 fluid overload & PNA  - intubated on 11/13/2020 for hypoxia   - palliative care only; will extubate tomorrow     =====GI=====  #no acute issues    =====Renal=====Dr. Caputo  #VASQUEZ  - baseline Cr of around 2.7 on 05/20  - Cr 4.19 (11/17) -> 3.56  - s/p Nabicarb 650mg PO q8h  - palliative care only    #Anasarca  - fluid overload  - alb 2.5 (trending up)  - s/p albumin 100ml q6h x8    =====ID=====  #possible PNA  - Scx, Bcx, and Ucx neg  - COVID neg  - CXR increasing bilateral pleural effusions, greater on the left. Cannot exclude underlying infiltrate or atelectasis  - palliative care only    #possible sepsis  - RR 30 on vent, temp 37.5C, HR wnl, band >10%  - BP in 90's  - possible sources: shiley catheter, ETT  - s/p vancomycin x1 and zyosin  - c/w vasopressin and levophed  - no fluid as he is fluid overloaded    =====Heme=====  #Anemia, likely 2/2 hematuria  - resolved  - hgb 9.2 (11/17) -> 8.5 ->8.1  - s/p 3u pRBC  - palliative care only    #Thrombocytopenia  - platelet noted to be trending down  - plt 90 (11/18)  - heparin ab neg  - palliative care only    =====Skin/Catheters=====  #no acute issues    =====Ppx  Feeding/fluid: nephro 40ml x 24hr  Analgesic: none  Sedation: none  Thrombolytics: heparin IVP q12  Head elevation: yes  Ulcer, stress: PPI 40mg IVP  Glycemic control: none  Bowel regimen: senna, miralax  Indwelling catheter: yes  Drug de-escalation: precedex titrated down   a 84 yo male with PMHx of CAD with stent, Afib on Eliquis, HTN, HLD, Gout who presented with weakness and shortness of breath. Pt is being admitted to ICU for hypoxic respiratory failure requiring BIPAP support.    0. Palliative now  1.Hypoxic Resp failure due to pneumonia and fluid overload  2. VASQUEZ vs CKD  3.Acute encephalopathy  4.Afib on Eliquis  5.HTN  6.BPH  7.Anemia  8. thrombocytopenia    =====CNS=====  #intubated and sedated since 11/13  - plan for extubation today 11/21 after the family came    #Acute Encephalopathy vs worsening dementia  - palliative care only    =====CVS=====  #Afib, on eliquis at home  - EKG afib, no acute ischemic changes  - hold heparin drip due to decreasing platelet count    #CAD  - h/o CAD, on no meds    =====Pulm=====  #acute pulmonary edema, likely 2/2 fluid overload from worsening VASQUEZ  - echo (10/22) normal EF, mild-mod MR and mild tricuspid regurgitation  - palliative care only; will extubate tomorrow     #acute hypoxic respiratory failure, likely 2/2 fluid overload & PNA  - intubated on 11/13/2020 for hypoxia   - palliative care only; will extubate today     =====GI=====  #no acute issues    =====Renal=====Dr. Caputo  #VASQUEZ  - baseline Cr of around 2.7 on 05/20  - Cr 4.19 (11/17) -> 3.56  - s/p Nabicarb 650mg PO q8h  - palliative care only    #Anasarca  - fluid overload  - alb 2.5 (trending up)  - s/p albumin 100ml q6h x8    =====ID=====  #possible PNA  - Scx, Bcx, and Ucx neg  - COVID neg  - CXR increasing bilateral pleural effusions, greater on the left. Cannot exclude underlying infiltrate or atelectasis  - palliative care only    #possible sepsis  - RR 30 on vent, temp 37.5C, HR wnl, band >10%  - BP in 90's  - possible sources: shiley catheter, ETT  - s/p vancomycin x1 and zyosin  - c/w vasopressin and levophed  - no fluid as he is fluid overloaded    =====Heme=====  #Anemia, likely 2/2 hematuria  - resolved  - hgb 9.2 (11/17) -> 8.5 ->8.1  - s/p 3u pRBC  - palliative care only    #Thrombocytopenia  - platelet noted to be trending down  - plt 90 (11/18)  - heparin ab neg  - palliative care only    =====Skin/Catheters=====  #no acute issues    =====Ppx  Feeding/fluid: nephro 40ml x 24hr  Analgesic: none  Sedation: none  Thrombolytics:  Head elevation: yes  Ulcer, stress: PPI 40mg IVP  Glycemic control: none  Bowel regimen: senna, miralax  Indwelling catheter: yes  Drug de-escalation: precedex titrated down

## 2020-11-21 NOTE — PROGRESS NOTE ADULT - ATTENDING COMMENTS
Remain critical, d/w team, palliative attending  Will continue supportive care at present
IMP: This is an 84 yo man  CAD with stent, Afib on Eliquis, HTN, HLD, Gout who presented with weakness and shortness of breath. Pt is being admitted to ICU for hypoxic respiratory failure requiring BIPAP support.    1.Hypoxic Resp failure due to pneumonia and fluid overload  2.CKD  3.Acute encephalopathy  4.Afib on Eliquis  5.HTN  6.BPH  7.Anemia        Plan  -intubated for resp failure 11/13  -monitor cbc ( s/p  1 unit transfusion (11/13)  -iv antibx  -f/u cultures  -off isolation for covid-19  -GI DVT prophy  -hemodynamic support  -asp precaution .   -will need dialysis  -coag abnormality being corrected with FFP and Vit K  -vascular for HD cath placement .. they requested for INR< 1.3  -HD as per neph  -sedated and pain control  -vanco level .
IMP: This is an 86 yo man  CAD with stent, Afib on Eliquis, HTN, HLD, Gout who presented with weakness and shortness of breath. Pt is being admitted to ICU for hypoxic respiratory failure    1.Acute Hypoxic Respiratory failure due to pneumonia   2.CKD  3.Acute encephalopathy  4.Afib on Eliquis  5.HTN  6.BPH  7.Anemia    Plan  - 11/19 - worsening shock overnight, increasing leukocytosis  -Sedation vacation and monitor neurologic status, remains poor.   -On fentanyl for comfort  -intubated for resp failure 11/13  -hemodynamic support  -asp precaution  -sedated and pain control  -Cont. mechanical ventilation  - Stress ulcer and DVT prophylaxis  - Overall prognosis is guarded given multiple organ dysfunction. Discussion with family about goals of care. Overall patient is declining. Will plan for comfort measures with possible withdrawal of care this weekend.
IMP: This is an 84 yo man  CAD with stent, Afib on Eliquis, HTN, HLD, Gout who presented with weakness and shortness of breath. Pt is being admitted to ICU for hypoxic respiratory failure    1.Acute Hypoxic Respiraotry failure due to pneumonia   2.CKD  3.Acute encephalopathy  4.Afib on Eliquis  5.HTN  6.BPH  7.Anemia    Plan  -intubated for resp failure 11/13  -iv antibiotics  -f/u cultures, negative thus far  -hemodynamic support  -asp precaution  -Cont. dialysis as per nephrology  - Albumin infusion given anasarca  -sedated and pain control  -vanco level  -Cont. mechanical ventilation  - Stress ulcer and DVT prophylaxis
IMP: This is an 84 yo man  CAD with stent, Afib on Eliquis, HTN, HLD, Gout who presented with weakness and shortness of breath. Pt is being admitted to ICU for hypoxic respiratory failure requiring BIPAP support.    1.Hypoxic Resp failure due to pneumonia and fluid overload  2.CKD  3.Acute encephalopathy  4.Afib on Eliquis  5.HTN  6.BPH  7.Anemia        Plan  -intubated for resp failure 11/13  -monitor cbc ( s/p  1 unit transfusion (11/13)  -iv antibx  -f/u cultures  -off isolation for covid-19  -GI DVT prophy  -hemodynamic support  -asp precaution .   -will need dialysis  -coag abnormality being corrected with FFP and Vit K  -vascular for HD cath placement .. they requested for INR< 1.3  -HD as per neph  -sedated and pain control  -vanco level .
IMP: This is an 84 yo man  CAD with stent, Afib on Eliquis, HTN, HLD, Gout who presented with weakness and shortness of breath. Pt is being admitted to ICU for hypoxic respiratory failure    1.Acute Hypoxic Respiraotry failure due to pneumonia   2.CKD  3.Acute encephalopathy  4.Afib on Eliquis  5.HTN  6.BPH  7.Anemia    Plan  -Sedation vacation and monitor neurologic status  -intubated for resp failure 11/13  -iv antibiotics, will complete 7 days  -hemodynamic support  -asp precaution  -Cont. dialysis as per nephrology aim for negative fluid volume  - Albumin infusion given anasarca  -sedated and pain control  -Cont. mechanical ventilation  - Stress ulcer and DVT prophylaxis
IMP: This is an 84 yo man  CAD with stent, Afib on Eliquis, HTN, HLD, Gout who presented with weakness and shortness of breath. Pt is being admitted to ICU for hypoxic respiratory failure requiring BIPAP support.    1.Hypoxic Resp failure due to pneumonia and fluid overload  2.CKD  3.Acute encephalopathy  4.Afib on Eliquis  5.HTN  6.BPH  7.Anemia        Plan  -intubated for resp failure 11/13  -iv antibx  -f/u cultures  -off isolation for covid-19  -GI DVT prophy  -hemodynamic support  -asp precaution .   -will need dialysis  -coag abnormality being corrected with FFP and Vit K  -vascular for HD cath placement .. they requested for INR< 1.3  -HD as per neph  -sedated and pain control  -vanco level
IMP: This is an 86 yo man  CAD with stent, Afib on Eliquis, HTN, HLD, Gout who presented with weakness and shortness of breath. Pt is being admitted to ICU for hypoxic respiratory failure requiring BIPAP support.    1.Acute Hypoxic Respiraotry failure due to pneumonia   2.CKD  3.Acute encephalopathy  4.Afib on Eliquis  5.HTN  6.BPH  7.Anemia    Plan  -intubated for resp failure 11/13  -No obvious signs of bleeding  -Transfuse 1 unit PRBC  -iv antibiotics  -f/u cultures, negative thus far  -GI DVT prophy  -hemodynamic support  -asp precaution  -Cont. dialysis   -sedated and pain control  -vanco level  -Cont. mechanical ventilation
IMP: This is an 84 yo man  CAD with stent, Afib on Eliquis, HTN, HLD, Gout who presented with weakness and shortness of breath. Pt is being admitted to ICU for hypoxic respiratory failure    1.Acute Hypoxic Respiratory failure due to pneumonia   2.CKD  3.Acute encephalopathy  4.Afib on Eliquis  5.HTN  6.BPH  7.Anemia    Plan  - Comfort measures only  - Patient liberated from the ventilator   - Cont. Fentanyl infusion  - Start Ativan prn and Glycopyrolate   - D/c all other treatments for now
IMP: This is an 84 yo man  CAD with stent, Afib on Eliquis, HTN, HLD, Gout who presented with weakness and shortness of breath. Pt is being admitted to ICU for hypoxic respiratory failure    1.Acute Hypoxic Respiratory failure due to pneumonia   2.CKD  3.Acute encephalopathy  4.Afib on Eliquis  5.HTN  6.BPH  7.Anemia    Plan  - 11/19 - worsening shock overnight, increasing leukocytosis  - Dose vancomycin, d/c groin HD catheter   -Sedation vacation and monitor neurologic status  -intubated for resp failure 11/13  -iv antibiotics, add vancomycin by GFR  -hemodynamic support  -asp precaution  -Cont. dialysis as per nephrology  - Albumin infusion given anasarca  -sedated and pain control  -Cont. mechanical ventilation  - Stress ulcer and DVT prophylaxis  - Overall prognosis is guarded given multiple organ dysfunction. Palliative care follow up requested

## 2020-11-21 NOTE — PROGRESS NOTE ADULT - CONVERSATION/DISCUSSION
Diagnosis/MOLST Discussed/Treatment Options/Prognosis
DRU Discussed/Treatment Options/Prognosis
Diagnosis/MOLST Discussed/Prognosis

## 2020-11-21 NOTE — PROGRESS NOTE ADULT - REASON FOR ADMISSION

## 2020-11-21 NOTE — PROGRESS NOTE ADULT - NUTRITIONAL ASSESSMENT
This patient has been assessed with a concern for Malnutrition and has been determined to have a diagnosis/diagnoses of Moderate protein-calorie malnutrition.    This patient is being managed with:   Diet NPO with Tube Feed-  Tube Feeding Modality: Nasogastric  Nepro with Carb Steady  Total Volume for 24 Hours (mL): 960  Continuous  Starting Tube Feed Rate {mL per Hour}: 10  Increase Tube Feed Rate by (mL): 10     Every 2 hours  Until Goal Tube Feed Rate (mL per Hour): 40  Tube Feed Duration (in Hours): 24  Tube Feed Start Time: 23:59  Entered: Nov 14 2020 11:24PM    

## 2020-11-21 NOTE — DISCHARGE NOTE FOR THE EXPIRED PATIENT - HOSPITAL COURSE
This was an 85 year old male, with PMH of CAD (w/ stent), Afib (on eliquis), HTN, HLD, and gout, presented with weakness and shortness of breath. Patient is admitted to ICU for hypoxic respiratory failure requiring BiPAP, 2/2 fluid overload. Patient was intubated and placed on ventilator. Patient also received hemodialysis for acute kidney failure and excessive fluid retention. Patient continue to have difficulty weaning off the ventilator and remained fluid overloaded despite of multiple hemodialysis. Patient also developed signs and symptoms of septic shock requiring pressor support. Patient's family contacted, and her son (who is HCP), decided to make the patient DNR and withdrawal any invasive care. Patient was decided by family to be made for comfort measures only and no escalation of care. Patient was terminally extubated at family request at 12:50PM on 2020 and  at 2:18PM on 2020.

## 2020-11-21 NOTE — PROGRESS NOTE ADULT - SUBJECTIVE AND OBJECTIVE BOX
INTERVAL HPI/OVERNIGHT EVENTS: ***    PRESSORS: [ ] YES [ ] NO  WHICH:    ANTIBIOTICS:                  DATE STARTED:  ANTIBIOTICS:                  DATE STARTED:  ANTIBIOTICS:                  DATE STARTED:    Antimicrobial:    Cardiovascular:  norepinephrine Infusion 0.1 MICROgram(s)/kG/Min IV Continuous <Continuous>  tamsulosin 0.4 milliGRAM(s) Oral at bedtime    Pulmonary:  ALBUTerol    90 MICROgram(s) HFA Inhaler 2 Puff(s) Inhalation every 6 hours    Hematalogic:    Other:  chlorhexidine 0.12% Liquid 15 milliLiter(s) Oral Mucosa every 12 hours  chlorhexidine 2% Cloths 1 Application(s) Topical <User Schedule>  dexMEDEtomidine Infusion 0.5 MICROgram(s)/kG/Hr IV Continuous <Continuous>  fentaNYL   Infusion 2 MICROgram(s)/kG/Hr IV Continuous <Continuous>  finasteride 5 milliGRAM(s) Oral daily  pantoprazole  Injectable 40 milliGRAM(s) IV Push daily  vasopressin Infusion 0.04 Unit(s)/Min IV Continuous <Continuous>    ALBUTerol    90 MICROgram(s) HFA Inhaler 2 Puff(s) Inhalation every 6 hours  chlorhexidine 0.12% Liquid 15 milliLiter(s) Oral Mucosa every 12 hours  chlorhexidine 2% Cloths 1 Application(s) Topical <User Schedule>  dexMEDEtomidine Infusion 0.5 MICROgram(s)/kG/Hr IV Continuous <Continuous>  fentaNYL   Infusion 2 MICROgram(s)/kG/Hr IV Continuous <Continuous>  finasteride 5 milliGRAM(s) Oral daily  norepinephrine Infusion 0.1 MICROgram(s)/kG/Min IV Continuous <Continuous>  pantoprazole  Injectable 40 milliGRAM(s) IV Push daily  tamsulosin 0.4 milliGRAM(s) Oral at bedtime  vasopressin Infusion 0.04 Unit(s)/Min IV Continuous <Continuous>    Drug Dosing Weight  Height (cm): 177.8 (12 Nov 2020 08:58)  Weight (kg): 76.5 (12 Nov 2020 08:58)  BMI (kg/m2): 24.2 (12 Nov 2020 08:58)  BSA (m2): 1.94 (12 Nov 2020 08:58)    CENTRAL LINE: [ ] YES [ ] NO  LOCATION:   DATE INSERTED:  REMOVE: [ ] YES [ ] NO  EXPLAIN:    ESCOBAR: [ ] YES [ ] NO    DATE INSERTED:  REMOVE:  [ ] YES [ ] NO  EXPLAIN:    A-LINE:  [ ] YES [ ] NO  LOCATION:   DATE INSERTED:  REMOVE:  [ ] YES [ ] NO  EXPLAIN:    PMH -reviewed admission note, no change since admission  PAST MEDICAL & SURGICAL HISTORY:  HLD (hyperlipidemia)    HTN (hypertension)    Afib    Gout    CAD (coronary artery disease)    No significant past surgical history        ICU Vital Signs Last 24 Hrs  T(C): 37.7 (21 Nov 2020 00:00), Max: 38.3 (20 Nov 2020 16:30)  T(F): 99.8 (21 Nov 2020 00:00), Max: 100.9 (20 Nov 2020 16:30)  HR: 94 (21 Nov 2020 00:45) (84 - 127)  BP: 109/62 (21 Nov 2020 00:45) (96/65 - 169/108)  BP(mean): 73 (21 Nov 2020 00:45) (63 - 120)  ABP: --  ABP(mean): --  RR: 15 (21 Nov 2020 00:45) (12 - 32)  SpO2: 100% (21 Nov 2020 00:45) (85% - 100%)      ABG - ( 20 Nov 2020 08:44 )  pH, Arterial: 7.39  pH, Blood: x     /  pCO2: 38    /  pO2: 60    / HCO3: 23    / Base Excess: -1.5  /  SaO2: 91                    11-19 @ 07:01  -  11-20 @ 07:00  --------------------------------------------------------  IN: 1886.9 mL / OUT: 345 mL / NET: 1541.9 mL        Mode: AC/ CMV (Assist Control/ Continuous Mandatory Ventilation)  RR (machine): 16  TV (machine): 480  FiO2: 100  PEEP: 5  ITime: 1  MAP: 13  PIP: 33      PHYSICAL EXAM:    GENERAL: [ ]NAD, [ ]well-groomed, [ ]well-developed  HEAD:  [ ]Atraumatic, [ ]Normocephalic  EYES: [ ]EOMI, [ ]PERRLA, [ ]conjunctiva and sclera clear  ENMT: [ ]No tonsillar erythema, exudates, or enlargement; [ ]Moist mucous membranes, [ ]Good dentition, [ ]No lesions  NECK: [ ]Supple, normal appearance, [ ]No JVD; [ ]Normal thyroid; [ ]Trachea midline  NERVOUS SYSTEM:  [ ]Alert & Oriented X3, [ ]Good concentration; [ ]Motor Strength 5/5 B/L upper and lower extremities; [ ]DTRs 2+ intact and symmetric  CHEST/LUNG: [ ]No chest deformity; [ ]Normal percussion bilaterally; [ ]No rales, rhonchi, wheezing   HEART: [ ]Regular rate and rhythm; [ ]No murmurs, rubs, or gallops  ABDOMEN: [ ]Soft, Nontender, Nondistended; [ ]Bowel sounds present  EXTREMITIES:  [ ]2+ Peripheral Pulses, [ ]No clubbing, cyanosis, or edema  LYMPH: [ ]No lymphadenopathy noted  SKIN: [ ]No rashes or lesions; [ ]Good capillary refill      LABS:  CBC Full  -  ( 20 Nov 2020 06:28 )  WBC Count : 14.27 K/uL  RBC Count : 2.42 M/uL  Hemoglobin : 7.8 g/dL  Hematocrit : 23.6 %  Platelet Count - Automated : 80 K/uL  Mean Cell Volume : 97.5 fl  Mean Cell Hemoglobin : 32.2 pg  Mean Cell Hemoglobin Concentration : 33.1 gm/dL  Auto Neutrophil # : SEE PREV.DIFF K/uL  Auto Lymphocyte # : SEE PREV DIFF K/uL  Auto Monocyte # : SEE PREV DIFF K/uL  Auto Eosinophil # : SEE PREV DIFF K/uL  Auto Basophil # : SEE PREV DIFF K/uL  Auto Neutrophil % : SEE PREV DIFF Manual diff was performed w-in 72 hrs.  Differential percentages must be correlated with absolute numbers for  clinical significance. %  Auto Lymphocyte % : SEE PREV DIFF %  Auto Monocyte % : SEE PREV DIFF %  Auto Eosinophil % : SEE PREV DIFF %  Auto Basophil % : SEE PREV.DIFF %    11-20    134<L>  |  100  |  50<H>  ----------------------------<  208<H>  3.6   |  24  |  4.24<H>    Ca    7.2<L>      20 Nov 2020 06:28  Phos  1.4     11-20  Mg     2.2     11-20    TPro  5.4<L>  /  Alb  2.5<L>  /  TBili  0.9  /  DBili  0.4<H>  /  AST  32  /  ALT  25  /  AlkPhos  134<H>  11-20            RADIOLOGY & ADDITIONAL STUDIES REVIEWED:  ***    [ ]GOALS OF CARE DISCUSSION WITH PATIENT/FAMILY/PROXY:    CRITICAL CARE TIME SPENT: 35 minutes INTERVAL HPI/OVERNIGHT EVENTS:   Pt was intubated and sedated. No major event happened overnight.    PRESSORS: [x ] YES [ ] NO  WHICH: vasopressin    Antimicrobial:    Cardiovascular:  norepinephrine Infusion 0.1 MICROgram(s)/kG/Min IV Continuous <Continuous>  tamsulosin 0.4 milliGRAM(s) Oral at bedtime    Pulmonary:  ALBUTerol    90 MICROgram(s) HFA Inhaler 2 Puff(s) Inhalation every 6 hours    Hematalogic:    Other:  chlorhexidine 0.12% Liquid 15 milliLiter(s) Oral Mucosa every 12 hours  chlorhexidine 2% Cloths 1 Application(s) Topical <User Schedule>  dexMEDEtomidine Infusion 0.5 MICROgram(s)/kG/Hr IV Continuous <Continuous>  fentaNYL   Infusion 2 MICROgram(s)/kG/Hr IV Continuous <Continuous>  finasteride 5 milliGRAM(s) Oral daily  pantoprazole  Injectable 40 milliGRAM(s) IV Push daily  vasopressin Infusion 0.04 Unit(s)/Min IV Continuous <Continuous>    ALBUTerol    90 MICROgram(s) HFA Inhaler 2 Puff(s) Inhalation every 6 hours  chlorhexidine 0.12% Liquid 15 milliLiter(s) Oral Mucosa every 12 hours  chlorhexidine 2% Cloths 1 Application(s) Topical <User Schedule>  dexMEDEtomidine Infusion 0.5 MICROgram(s)/kG/Hr IV Continuous <Continuous>  fentaNYL   Infusion 2 MICROgram(s)/kG/Hr IV Continuous <Continuous>  finasteride 5 milliGRAM(s) Oral daily  norepinephrine Infusion 0.1 MICROgram(s)/kG/Min IV Continuous <Continuous>  pantoprazole  Injectable 40 milliGRAM(s) IV Push daily  tamsulosin 0.4 milliGRAM(s) Oral at bedtime  vasopressin Infusion 0.04 Unit(s)/Min IV Continuous <Continuous>    Drug Dosing Weight  Height (cm): 177.8 (12 Nov 2020 08:58)  Weight (kg): 76.5 (12 Nov 2020 08:58)  BMI (kg/m2): 24.2 (12 Nov 2020 08:58)  BSA (m2): 1.94 (12 Nov 2020 08:58)    CENTRAL LINE: [x ] YES [ ] NO  LOCATION: [facility]   DATE INSERTED:  REMOVE: [ ] YES [ ] NO  EXPLAIN:    ESCOBAR: [x ] YES [ ] NO    DATE INSERTED:  REMOVE:  [ ] YES [ ] NO  EXPLAIN:    A-LINE:  [ ] YES [x ] NO  LOCATION:   DATE INSERTED:  REMOVE:  [ ] YES [ ] NO  EXPLAIN:    PMH -reviewed admission note, no change since admission  PAST MEDICAL & SURGICAL HISTORY:  HLD (hyperlipidemia)    HTN (hypertension)    Afib    Gout    CAD (coronary artery disease)    No significant past surgical history        ICU Vital Signs Last 24 Hrs  T(C): 37.7 (21 Nov 2020 00:00), Max: 38.3 (20 Nov 2020 16:30)  T(F): 99.8 (21 Nov 2020 00:00), Max: 100.9 (20 Nov 2020 16:30)  HR: 94 (21 Nov 2020 00:45) (84 - 127)  BP: 109/62 (21 Nov 2020 00:45) (96/65 - 169/108)  BP(mean): 73 (21 Nov 2020 00:45) (63 - 120)  ABP: --  ABP(mean): --  RR: 15 (21 Nov 2020 00:45) (12 - 32)  SpO2: 100% (21 Nov 2020 00:45) (85% - 100%)      ABG - ( 20 Nov 2020 08:44 )  pH, Arterial: 7.39  pH, Blood: x     /  pCO2: 38    /  pO2: 60    / HCO3: 23    / Base Excess: -1.5  /  SaO2: 91                    11-19 @ 07:01  -  11-20 @ 07:00  --------------------------------------------------------  IN: 1886.9 mL / OUT: 345 mL / NET: 1541.9 mL        Mode: AC/ CMV (Assist Control/ Continuous Mandatory Ventilation)  RR (machine): 16  TV (machine): 480  FiO2: 100  PEEP: 5  ITime: 1  MAP: 13  PIP: 33      PHYSICAL EXAM:    GENERAL: sedated and intubated  CHEST/LUNG: No rales, rhonchi, wheezing   HEART: Regular rate and rhythm; No murmurs,   ABDOMEN: Soft, Nontender, Nondistended; Bowel sounds present  EXTREMITIES: +2/3 generalized pitting edema in all extremities;  2+ Peripheral Pulses, No clubbing or edema      LABS:  CBC Full  -  ( 20 Nov 2020 06:28 )  WBC Count : 14.27 K/uL  RBC Count : 2.42 M/uL  Hemoglobin : 7.8 g/dL  Hematocrit : 23.6 %  Platelet Count - Automated : 80 K/uL  Mean Cell Volume : 97.5 fl  Mean Cell Hemoglobin : 32.2 pg  Mean Cell Hemoglobin Concentration : 33.1 gm/dL  Auto Neutrophil # : SEE PREV.DIFF K/uL  Auto Lymphocyte # : SEE PREV DIFF K/uL  Auto Monocyte # : SEE PREV DIFF K/uL  Auto Eosinophil # : SEE PREV DIFF K/uL  Auto Basophil # : SEE PREV DIFF K/uL  Auto Neutrophil % : SEE PREV DIFF Manual diff was performed w-in 72 hrs.  Differential percentages must be correlated with absolute numbers for  clinical significance. %  Auto Lymphocyte % : SEE PREV DIFF %  Auto Monocyte % : SEE PREV DIFF %  Auto Eosinophil % : SEE PREV DIFF %  Auto Basophil % : SEE PREV.DIFF %    11-20    134<L>  |  100  |  50<H>  ----------------------------<  208<H>  3.6   |  24  |  4.24<H>    Ca    7.2<L>      20 Nov 2020 06:28  Phos  1.4     11-20  Mg     2.2     11-20    TPro  5.4<L>  /  Alb  2.5<L>  /  TBili  0.9  /  DBili  0.4<H>  /  AST  32  /  ALT  25  /  AlkPhos  134<H>  11-20            RADIOLOGY & ADDITIONAL STUDIES REVIEWED:  ***    [ ]GOALS OF CARE DISCUSSION WITH PATIENT/FAMILY/PROXY:    CRITICAL CARE TIME SPENT: 35 minutes INTERVAL HPI/OVERNIGHT EVENTS:   Pt was intubated and sedated. No major event happened overnight. Pt to be terminally extubated after family arrives.     PRESSORS: [x ] YES [ ] NO  WHICH: vasopressin    Antimicrobial:    Cardiovascular:  norepinephrine Infusion 0.1 MICROgram(s)/kG/Min IV Continuous <Continuous>  tamsulosin 0.4 milliGRAM(s) Oral at bedtime    Pulmonary:  ALBUTerol    90 MICROgram(s) HFA Inhaler 2 Puff(s) Inhalation every 6 hours    Hematalogic:    Other:  chlorhexidine 0.12% Liquid 15 milliLiter(s) Oral Mucosa every 12 hours  chlorhexidine 2% Cloths 1 Application(s) Topical <User Schedule>  dexMEDEtomidine Infusion 0.5 MICROgram(s)/kG/Hr IV Continuous <Continuous>  fentaNYL   Infusion 2 MICROgram(s)/kG/Hr IV Continuous <Continuous>  finasteride 5 milliGRAM(s) Oral daily  pantoprazole  Injectable 40 milliGRAM(s) IV Push daily  vasopressin Infusion 0.04 Unit(s)/Min IV Continuous <Continuous>    ALBUTerol    90 MICROgram(s) HFA Inhaler 2 Puff(s) Inhalation every 6 hours  chlorhexidine 0.12% Liquid 15 milliLiter(s) Oral Mucosa every 12 hours  chlorhexidine 2% Cloths 1 Application(s) Topical <User Schedule>  dexMEDEtomidine Infusion 0.5 MICROgram(s)/kG/Hr IV Continuous <Continuous>  fentaNYL   Infusion 2 MICROgram(s)/kG/Hr IV Continuous <Continuous>  finasteride 5 milliGRAM(s) Oral daily  norepinephrine Infusion 0.1 MICROgram(s)/kG/Min IV Continuous <Continuous>  pantoprazole  Injectable 40 milliGRAM(s) IV Push daily  tamsulosin 0.4 milliGRAM(s) Oral at bedtime  vasopressin Infusion 0.04 Unit(s)/Min IV Continuous <Continuous>    Drug Dosing Weight  Height (cm): 177.8 (12 Nov 2020 08:58)  Weight (kg): 76.5 (12 Nov 2020 08:58)  BMI (kg/m2): 24.2 (12 Nov 2020 08:58)  BSA (m2): 1.94 (12 Nov 2020 08:58)    CENTRAL LINE: [x ] YES [ ] NO  LOCATION: [facility]   DATE INSERTED:  REMOVE: [ ] YES [ ] NO  EXPLAIN:    ESCOBAR: [x ] YES [ ] NO    DATE INSERTED:  REMOVE:  [ ] YES [ ] NO  EXPLAIN:    A-LINE:  [ ] YES [x ] NO  LOCATION:   DATE INSERTED:  REMOVE:  [ ] YES [ ] NO  EXPLAIN:    PMH -reviewed admission note, no change since admission  PAST MEDICAL & SURGICAL HISTORY:  HLD (hyperlipidemia)    HTN (hypertension)    Afib    Gout    CAD (coronary artery disease)    No significant past surgical history        ICU Vital Signs Last 24 Hrs  T(C): 37.7 (21 Nov 2020 00:00), Max: 38.3 (20 Nov 2020 16:30)  T(F): 99.8 (21 Nov 2020 00:00), Max: 100.9 (20 Nov 2020 16:30)  HR: 94 (21 Nov 2020 00:45) (84 - 127)  BP: 109/62 (21 Nov 2020 00:45) (96/65 - 169/108)  BP(mean): 73 (21 Nov 2020 00:45) (63 - 120)  ABP: --  ABP(mean): --  RR: 15 (21 Nov 2020 00:45) (12 - 32)  SpO2: 100% (21 Nov 2020 00:45) (85% - 100%)      ABG - ( 20 Nov 2020 08:44 )  pH, Arterial: 7.39  pH, Blood: x     /  pCO2: 38    /  pO2: 60    / HCO3: 23    / Base Excess: -1.5  /  SaO2: 91                    11-19 @ 07:01  -  11-20 @ 07:00  --------------------------------------------------------  IN: 1886.9 mL / OUT: 345 mL / NET: 1541.9 mL        Mode: AC/ CMV (Assist Control/ Continuous Mandatory Ventilation)  RR (machine): 16  TV (machine): 480  FiO2: 100  PEEP: 5  ITime: 1  MAP: 13  PIP: 33      PHYSICAL EXAM:    GENERAL: sedated and intubated  CHEST/LUNG: No rales, rhonchi, wheezing   HEART: Regular rate and rhythm; No murmurs,   ABDOMEN: Soft, Nontender, Nondistended; Bowel sounds present  EXTREMITIES: +2/3 generalized pitting edema in all extremities;  2+ Peripheral Pulses, No clubbing or edema      LABS:  CBC Full  -  ( 20 Nov 2020 06:28 )  WBC Count : 14.27 K/uL  RBC Count : 2.42 M/uL  Hemoglobin : 7.8 g/dL  Hematocrit : 23.6 %  Platelet Count - Automated : 80 K/uL  Mean Cell Volume : 97.5 fl  Mean Cell Hemoglobin : 32.2 pg  Mean Cell Hemoglobin Concentration : 33.1 gm/dL  Auto Neutrophil # : SEE PREV.DIFF K/uL  Auto Lymphocyte # : SEE PREV DIFF K/uL  Auto Monocyte # : SEE PREV DIFF K/uL  Auto Eosinophil # : SEE PREV DIFF K/uL  Auto Basophil # : SEE PREV DIFF K/uL  Auto Neutrophil % : SEE PREV DIFF Manual diff was performed w-in 72 hrs.  Differential percentages must be correlated with absolute numbers for  clinical significance. %  Auto Lymphocyte % : SEE PREV DIFF %  Auto Monocyte % : SEE PREV DIFF %  Auto Eosinophil % : SEE PREV DIFF %  Auto Basophil % : SEE PREV.DIFF %    11-20    134<L>  |  100  |  50<H>  ----------------------------<  208<H>  3.6   |  24  |  4.24<H>    Ca    7.2<L>      20 Nov 2020 06:28  Phos  1.4     11-20  Mg     2.2     11-20    TPro  5.4<L>  /  Alb  2.5<L>  /  TBili  0.9  /  DBili  0.4<H>  /  AST  32  /  ALT  25  /  AlkPhos  134<H>  11-20            RADIOLOGY & ADDITIONAL STUDIES REVIEWED:  ***    [ ]GOALS OF CARE DISCUSSION WITH PATIENT/FAMILY/PROXY:    CRITICAL CARE TIME SPENT: 35 minutes

## 2020-11-21 NOTE — PROGRESS NOTE ADULT - CONVERSATION DETAILS
Discussed prognosis with the family. Wants to pursue comfort measures at this time. Plan for withdrawal of care today. No reintubation, no further life sustaining treatments.

## 2021-10-08 NOTE — CONSULT NOTE ADULT - CONSULT REASON
Group Topic: BH Coping Skills Education    Date: 10/8/2021  Start Time: 1300  End Time: 1350  Facilitators: ANDREA Mitchell    Focus:  Gratitude  Number in attendance: 7  Group discussed how gratitude is a coping skill. Then they played the Gratitude Game. This involves rolling a pair of dice and sharing what the number correlates with. For example, number 5 is share a holiday that you are grateful for.     Method: Group  Attendance: Present  Participation: Active  Patient Response: Attentive, Good eye contact and Interactive  Mood: Anxious  Affect: Type: Anxious  Behavior/Socialization: Appropriate to group, Cooperative and Engaged  Thought Process: Tracking  Task Performance: Follows directions  Patient Evaluation: Independent - full participation  Pt engaged well with the discussion and provided appropriate feedback. He shared that he is grateful for Thanksgiving because of all of the food. There were no behavioral concerns during group.  ANDREA Ascencio, APSW         Renal failure
